# Patient Record
Sex: FEMALE | Race: WHITE | Employment: OTHER | URBAN - METROPOLITAN AREA
[De-identification: names, ages, dates, MRNs, and addresses within clinical notes are randomized per-mention and may not be internally consistent; named-entity substitution may affect disease eponyms.]

---

## 2017-04-04 ENCOUNTER — TELEPHONE (OUTPATIENT)
Dept: FAMILY MEDICINE CLINIC | Facility: CLINIC | Age: 77
End: 2017-04-04

## 2017-04-04 DIAGNOSIS — E03.9 ACQUIRED HYPOTHYROIDISM: Primary | Chronic | ICD-10-CM

## 2017-04-04 RX ORDER — LEVOTHYROXINE SODIUM 0.07 MG/1
TABLET ORAL
Qty: 90 TABLET | Refills: 0 | Status: SHIPPED | OUTPATIENT
Start: 2017-04-04 | End: 2017-05-12

## 2017-04-11 ENCOUNTER — MA CHART PREP (OUTPATIENT)
Dept: FAMILY MEDICINE CLINIC | Facility: CLINIC | Age: 77
End: 2017-04-11

## 2017-04-11 PROBLEM — H25.9 AGE RELATED CATARACT: Status: ACTIVE | Noted: 2017-04-11

## 2017-04-11 PROBLEM — K76.0 FATTY INFILTRATION OF LIVER: Status: ACTIVE | Noted: 2017-04-11

## 2017-04-11 PROBLEM — E66.9 OBESITY (BMI 30.0-34.9): Status: RESOLVED | Noted: 2017-04-11 | Resolved: 2017-04-11

## 2017-04-11 PROBLEM — E66.9 OBESITY (BMI 30.0-34.9): Status: ACTIVE | Noted: 2017-04-11

## 2017-04-11 PROBLEM — E66.811 OBESITY (BMI 30.0-34.9): Status: RESOLVED | Noted: 2017-04-11 | Resolved: 2017-04-11

## 2017-04-11 PROBLEM — E66.811 OBESITY (BMI 30.0-34.9): Status: ACTIVE | Noted: 2017-04-11

## 2017-05-07 ENCOUNTER — OFFICE VISIT (OUTPATIENT)
Dept: FAMILY MEDICINE CLINIC | Facility: CLINIC | Age: 77
End: 2017-05-07

## 2017-05-07 VITALS
HEART RATE: 76 BPM | OXYGEN SATURATION: 99 % | RESPIRATION RATE: 20 BRPM | SYSTOLIC BLOOD PRESSURE: 116 MMHG | DIASTOLIC BLOOD PRESSURE: 68 MMHG | TEMPERATURE: 98 F

## 2017-05-07 DIAGNOSIS — J00 ACUTE NASOPHARYNGITIS: Primary | ICD-10-CM

## 2017-05-07 PROCEDURE — 87880 STREP A ASSAY W/OPTIC: CPT | Performed by: NURSE PRACTITIONER

## 2017-05-07 PROCEDURE — 99213 OFFICE O/P EST LOW 20 MIN: CPT | Performed by: NURSE PRACTITIONER

## 2017-05-07 RX ORDER — ANASTROZOLE 1 MG/1
TABLET ORAL
COMMUNITY
Start: 2017-02-06 | End: 2017-11-15 | Stop reason: ALTCHOICE

## 2017-05-07 NOTE — PATIENT INSTRUCTIONS
Rapid strep is negative  OK to use the [facility] as needed      Adult Self-Care for Colds  Colds are caused by viruses. They can’t be cured with antibiotics. However, you can relieve symptoms and support your body’s efforts to heal itself.  No matter wh When you first notice symptoms, ask your health care provider if antiviral medications are appropriate. Antibiotics should not be taken for colds or flu.  Also, call your doctor if you have any of the following symptoms or if you aren’t feeling better after

## 2017-05-07 NOTE — PROGRESS NOTES
CHIEF COMPLAINT:   Patient presents with:  URI      HPI:   Leah Gutierres is a 78year old female who presents for upper respiratory symptoms for  5 days. Patient reports sore throat, congestion. Symptoms have been constant since onset.   Treating symp HYSTERECTOMY  2/12/13    Comment Prolapse    COLONOSCOPY  1/1/2011    Comment Dr Sabas Rasmussen           Smoking Status: Never Smoker                      Smokeless Status: Never Used                        Alcohol Use: No                  REVIEW OF SYSTEMS:   G Colds are caused by viruses. They can’t be cured with antibiotics. However, you can relieve symptoms and support your body’s efforts to heal itself.  No matter which symptoms you have, be sure to drink plenty of fluids (water or clear soup); stop smoking an When you first notice symptoms, ask your health care provider if antiviral medications are appropriate. Antibiotics should not be taken for colds or flu.  Also, call your doctor if you have any of the following symptoms or if you aren’t feeling better after

## 2017-05-08 ENCOUNTER — TELEPHONE (OUTPATIENT)
Dept: FAMILY MEDICINE CLINIC | Facility: CLINIC | Age: 77
End: 2017-05-08

## 2017-05-10 ENCOUNTER — APPOINTMENT (OUTPATIENT)
Dept: LAB | Age: 77
End: 2017-05-10
Attending: FAMILY MEDICINE
Payer: MEDICARE

## 2017-05-10 DIAGNOSIS — E03.9 ACQUIRED HYPOTHYROIDISM: Chronic | ICD-10-CM

## 2017-05-10 DIAGNOSIS — E11.9 DIABETES MELLITUS TYPE 2, DIET-CONTROLLED (HCC): Chronic | ICD-10-CM

## 2017-05-10 PROCEDURE — 84439 ASSAY OF FREE THYROXINE: CPT

## 2017-05-10 PROCEDURE — 80061 LIPID PANEL: CPT

## 2017-05-10 PROCEDURE — 36415 COLL VENOUS BLD VENIPUNCTURE: CPT

## 2017-05-10 PROCEDURE — 83036 HEMOGLOBIN GLYCOSYLATED A1C: CPT

## 2017-05-10 PROCEDURE — 82570 ASSAY OF URINE CREATININE: CPT

## 2017-05-10 PROCEDURE — 84443 ASSAY THYROID STIM HORMONE: CPT

## 2017-05-10 PROCEDURE — 82043 UR ALBUMIN QUANTITATIVE: CPT

## 2017-05-10 PROCEDURE — 80053 COMPREHEN METABOLIC PANEL: CPT

## 2017-05-11 PROBLEM — E11.22 CKD STAGE 3 SECONDARY TO DIABETES (HCC): Status: ACTIVE | Noted: 2017-05-11

## 2017-05-11 PROBLEM — D69.6 THROMBOCYTOPENIA (HCC): Status: ACTIVE | Noted: 2017-05-11

## 2017-05-11 PROBLEM — N18.30 CKD STAGE 3 SECONDARY TO DIABETES (HCC): Status: ACTIVE | Noted: 2017-05-11

## 2017-05-11 PROBLEM — D69.6 THROMBOCYTOPENIA: Status: ACTIVE | Noted: 2017-05-11

## 2017-05-12 ENCOUNTER — OFFICE VISIT (OUTPATIENT)
Dept: FAMILY MEDICINE CLINIC | Facility: CLINIC | Age: 77
End: 2017-05-12

## 2017-05-12 VITALS
DIASTOLIC BLOOD PRESSURE: 60 MMHG | RESPIRATION RATE: 14 BRPM | HEIGHT: 63 IN | TEMPERATURE: 98 F | HEART RATE: 62 BPM | WEIGHT: 166 LBS | BODY MASS INDEX: 29.41 KG/M2 | SYSTOLIC BLOOD PRESSURE: 110 MMHG

## 2017-05-12 DIAGNOSIS — D69.6 THROMBOCYTOPENIA (HCC): ICD-10-CM

## 2017-05-12 DIAGNOSIS — Z00.00 ANNUAL PHYSICAL EXAM: Primary | ICD-10-CM

## 2017-05-12 DIAGNOSIS — N32.81 OVERACTIVE BLADDER: ICD-10-CM

## 2017-05-12 DIAGNOSIS — K21.9 GASTROESOPHAGEAL REFLUX DISEASE WITHOUT ESOPHAGITIS: ICD-10-CM

## 2017-05-12 DIAGNOSIS — Z17.0 MALIGNANT NEOPLASM OF UPPER-OUTER QUADRANT OF RIGHT BREAST IN FEMALE, ESTROGEN RECEPTOR POSITIVE (HCC): ICD-10-CM

## 2017-05-12 DIAGNOSIS — N18.30 CKD STAGE 3 SECONDARY TO DIABETES (HCC): ICD-10-CM

## 2017-05-12 DIAGNOSIS — M1A.0720 CHRONIC IDIOPATHIC GOUT INVOLVING TOE OF LEFT FOOT WITHOUT TOPHUS: Chronic | ICD-10-CM

## 2017-05-12 DIAGNOSIS — E78.5 HYPERLIPIDEMIA WITH TARGET LDL LESS THAN 70: Chronic | ICD-10-CM

## 2017-05-12 DIAGNOSIS — I10 HYPERTENSION GOAL BP (BLOOD PRESSURE) < 130/80: Chronic | ICD-10-CM

## 2017-05-12 DIAGNOSIS — H25.013 CORTICAL AGE-RELATED CATARACT OF BOTH EYES: ICD-10-CM

## 2017-05-12 DIAGNOSIS — E11.9 DIABETES MELLITUS TYPE 2, DIET-CONTROLLED (HCC): Chronic | ICD-10-CM

## 2017-05-12 DIAGNOSIS — C50.411 MALIGNANT NEOPLASM OF UPPER-OUTER QUADRANT OF RIGHT BREAST IN FEMALE, ESTROGEN RECEPTOR POSITIVE (HCC): ICD-10-CM

## 2017-05-12 DIAGNOSIS — E03.9 ACQUIRED HYPOTHYROIDISM: Chronic | ICD-10-CM

## 2017-05-12 DIAGNOSIS — Z00.00 ENCOUNTER FOR ANNUAL HEALTH EXAMINATION: ICD-10-CM

## 2017-05-12 DIAGNOSIS — F32.5 MAJOR DEPRESSION IN COMPLETE REMISSION (HCC): Chronic | ICD-10-CM

## 2017-05-12 DIAGNOSIS — K76.0 FATTY INFILTRATION OF LIVER: ICD-10-CM

## 2017-05-12 DIAGNOSIS — N99.3 PELVIC RELAXATION DUE TO VAGINAL VAULT PROLAPSE, POSTHYSTERECTOMY: ICD-10-CM

## 2017-05-12 DIAGNOSIS — E11.22 CKD STAGE 3 SECONDARY TO DIABETES (HCC): ICD-10-CM

## 2017-05-12 PROCEDURE — G0439 PPPS, SUBSEQ VISIT: HCPCS | Performed by: FAMILY MEDICINE

## 2017-05-12 PROCEDURE — 96160 PT-FOCUSED HLTH RISK ASSMT: CPT | Performed by: FAMILY MEDICINE

## 2017-05-12 PROCEDURE — 99397 PER PM REEVAL EST PAT 65+ YR: CPT | Performed by: FAMILY MEDICINE

## 2017-05-12 RX ORDER — RANITIDINE 150 MG/1
150 TABLET ORAL 2 TIMES DAILY
Qty: 180 TABLET | Refills: 3 | Status: SHIPPED | OUTPATIENT
Start: 2017-05-12 | End: 2017-11-15

## 2017-05-12 RX ORDER — LEVOTHYROXINE SODIUM 0.07 MG/1
75 TABLET ORAL
Qty: 90 TABLET | Refills: 3 | Status: SHIPPED | OUTPATIENT
Start: 2017-05-12 | End: 2018-05-18

## 2017-05-12 NOTE — ASSESSMENT & PLAN NOTE
Recent left ankle swelling, but toe is pepito place, daily allopurinol, but no other issues    Lab Results  Component Value Date   URIC 3.4 10/10/2016   URIC 3.7 11/07/2014   WBC 5.4 10/10/2016   CREATSERUM 1.04* 05/10/2017   GFR 53* 05/10/2017

## 2017-05-12 NOTE — PROGRESS NOTES
HPI:   Geovanni Leblanc is a 78year old female who presents for a MA (Medicare Advantage) Supervisit (Once per calendar year).     Doing well with DM and CKD 3 with stable GFR  Diabetes    Procedures    Last refreshed: 5/12/2017  9:39 AM:  Last eye exam file       Last refreshed: 5/12/2017  9:39 AM:  ED visits for diabetes 0       Last refreshed: 5/12/2017  9:39 AM:  Last office visit 5/12/2017          Comorbidities    Has hypertension      Current as of: 5/12/2017  9:39 AM           Her last annual asse with Beaulah Meals, MD:  Mirabegron ER (MYRBETRIQ) 50 MG Oral Tablet 24 Hr Take 50 mg by mouth daily.    Levothyroxine Sodium 75 MCG Oral Tab Take 1 tablet (75 mcg total) by mouth before breakfast.   RaNITidine HCl 150 MG Oral Tab Take 1 tablet (150 mg total Gastrointestinal: Negative for abdominal pain. Endocrine: Negative for polydipsia, polyphagia and polyuria. Skin: Negative for rash. Allergic/Immunologic: Negative for immunocompromised state. Neurological: Negative for weakness and numbness.    H gallop, no S3, no S4 and no friction rub. No murmur heard. Edema not present. Carotid bruit not present. Pulmonary/Chest: Effort normal and breath sounds normal. No respiratory distress. She has no decreased breath sounds. She has no wheezes.  She has (blood pressure) < 130/80 (Chronic)    Overview     Losartan -12.5         Current Assessment & Plan     Stable, Continue present management.     Blood Pressure and Cardiac Medications          Losartan Potassium-HCTZ 100-12.5 MG Oral Tab Musculoskeletal    Gout (Chronic)    Overview     Allopurinol 300         Current Assessment & Plan     Recent left ankle swelling, but toe is pepito place, daily allopurinol, but no other issues    Lab Results  Component Value Date   URIC 3.4 10/10/2016   UR indicates understanding of these issues and agrees to the plan. Return in 6 months (on 11/12/2017).      Regina Strange MD, 5/12/2017     General Health     In the past six months, have you lost more than 10 pounds without trying?: 2 - No    Has your appet Little interest or pleasure in doing things (over the last two weeks)?: Not at all    Feeling down, depressed, or hopeless (over the last two weeks)?: Not at all    PHQ-2 SCORE: 0        Advance Directives     Do you have a healthcare power of ?: years No results found for this or any previous visit. No flowsheet data found.     Pap and Pelvic      Pap: Every 3 yrs age 21-68 or Pap+HPV every 5 yrs age 33-67, age 72 and older at high risk There are no preventive care reminders to display for this pat exam  Annually Data entered on: 10/20/2016   Last Dilated Eye Exam 10/19/2016     No flowsheet data found. COPD      Spirometry Testing Annually Spirometry date:  No flowsheet data found.       Template: [facility] MEDICARE ANNUAL ASSESSMENT FEMALE NOTEWRIT

## 2017-05-12 NOTE — ASSESSMENT & PLAN NOTE
Stable, Continue present management.     Cholesterol Lowering Medications          SIMVASTATIN 10 MG Oral Tab

## 2017-05-12 NOTE — ASSESSMENT & PLAN NOTE
Stable, Continue present management.     Thyroid  (most recent labs)     Lab Results  Component Value Date/Time   TSH 2.000 05/10/2017 07:33 AM   T4F 1.1 05/10/2017 07:33 AM         Endocrine Medications          Levothyroxine Sodium 75 MCG Oral Tab

## 2017-05-12 NOTE — PATIENT INSTRUCTIONS
Ino Cuevas's SCREENING SCHEDULE   Tests on this list are recommended by your physician but may not be covered, or covered at this frequency, by your insurer. Please check with your insurance carrier before scheduling to verify coverage.    PREVEN smoked more than 100 cigarettes in their lifetime   • Anyone with a family history    Colorectal Cancer Screening  Covered up to Age 76     Colonoscopy Screen   Covered every 10 years- more often if abnormal Colonoscopy,10 Years due on 01/01/2021 Update He previous visit.  Please get every year    Pneumococcal 13 (Prevnar)  Covered Once after 65   Orders placed or performed in visit on 03/09/16  -PNEUMOCOCCAL VACC, 13 LIDA IM    Please get once after your 65th birthday    Pneumococcal 23 (Pneumovax)  Covered O

## 2017-05-12 NOTE — ASSESSMENT & PLAN NOTE
Stable, Continue present management.     Blood Pressure and Cardiac Medications          Losartan Potassium-HCTZ 100-12.5 MG Oral Tab

## 2017-06-19 PROBLEM — M77.50 TENDONITIS OF FOOT: Status: ACTIVE | Noted: 2017-06-19

## 2017-08-02 PROBLEM — M72.2 PLANTAR FASCIITIS: Status: ACTIVE | Noted: 2017-08-02

## 2017-08-27 ENCOUNTER — OFFICE VISIT (OUTPATIENT)
Dept: FAMILY MEDICINE CLINIC | Facility: CLINIC | Age: 77
End: 2017-08-27

## 2017-08-27 VITALS
OXYGEN SATURATION: 97 % | SYSTOLIC BLOOD PRESSURE: 118 MMHG | TEMPERATURE: 98 F | HEIGHT: 63 IN | HEART RATE: 97 BPM | WEIGHT: 166 LBS | DIASTOLIC BLOOD PRESSURE: 68 MMHG | BODY MASS INDEX: 29.41 KG/M2

## 2017-08-27 DIAGNOSIS — H65.191 ACUTE EFFUSION OF RIGHT EAR: Primary | ICD-10-CM

## 2017-08-27 DIAGNOSIS — J01.10 ACUTE FRONTAL SINUSITIS, RECURRENCE NOT SPECIFIED: ICD-10-CM

## 2017-08-27 PROCEDURE — 99213 OFFICE O/P EST LOW 20 MIN: CPT | Performed by: NURSE PRACTITIONER

## 2017-08-27 RX ORDER — AMOXICILLIN AND CLAVULANATE POTASSIUM 875; 125 MG/1; MG/1
1 TABLET, FILM COATED ORAL 2 TIMES DAILY
Qty: 20 TABLET | Refills: 0 | Status: SHIPPED | OUTPATIENT
Start: 2017-08-27 | End: 2017-09-06

## 2017-08-27 NOTE — PROGRESS NOTES
CHIEF COMPLAINT:   Patient presents with:  Sore Throat: Right side of throat for 9 days  Ear Pain: right ear for 3 days      HPI:   Mandy Ray is a 78year old female who presents for upper respiratory symptoms for  9 days.  Patient reports sore th Medical History:   Diagnosis Date   • Breast cancer [facility]) 09-07-12   • S/P lumpectomy, right breast 09/12      Past Surgical History:  1/1/2011: COLONOSCOPY      Comment: Dr Cuca Ramos  3/1/2011: ESOPHAGOSCOPY,BIOPSY      Comment: Dr Alysha Stratton  2/12/13: Fabrice Hylton Clavulanate 875-125 MG Oral Tab 20 tablet 0      Sig: Take 1 tablet by mouth 2 (two) times daily. Risks, benefits, and side effects of medication explained and discussed.         The patient indicates understanding of these issues and agrees to th

## 2017-09-01 DIAGNOSIS — E78.5 HYPERLIPIDEMIA WITH TARGET LDL LESS THAN 70: Chronic | ICD-10-CM

## 2017-09-05 RX ORDER — SIMVASTATIN 10 MG
TABLET ORAL
Qty: 90 TABLET | Refills: 0 | Status: SHIPPED | OUTPATIENT
Start: 2017-09-05 | End: 2017-12-06

## 2017-10-19 DIAGNOSIS — K21.9 GASTROESOPHAGEAL REFLUX DISEASE WITHOUT ESOPHAGITIS: ICD-10-CM

## 2017-10-19 DIAGNOSIS — M1A.0720 CHRONIC IDIOPATHIC GOUT INVOLVING TOE OF LEFT FOOT WITHOUT TOPHUS: Chronic | ICD-10-CM

## 2017-10-23 RX ORDER — ALLOPURINOL 300 MG/1
TABLET ORAL
Qty: 90 TABLET | Refills: 0 | Status: SHIPPED | OUTPATIENT
Start: 2017-10-23 | End: 2018-01-23

## 2017-10-23 RX ORDER — PANTOPRAZOLE SODIUM 40 MG/1
40 TABLET, DELAYED RELEASE ORAL
Qty: 90 TABLET | Refills: 0 | Status: SHIPPED | OUTPATIENT
Start: 2017-10-23 | End: 2018-01-23

## 2017-10-23 NOTE — TELEPHONE ENCOUNTER
Allopurinol requested through pharmacy. LOV 05/12/17 and labs 05/10/17. Next appt 11/15/17. Will you approve refill ? Routed to Dr Rani Anderson.

## 2017-11-09 ENCOUNTER — TELEPHONE (OUTPATIENT)
Dept: FAMILY MEDICINE CLINIC | Facility: CLINIC | Age: 77
End: 2017-11-09

## 2017-11-09 DIAGNOSIS — E03.9 ACQUIRED HYPOTHYROIDISM: Chronic | ICD-10-CM

## 2017-11-09 DIAGNOSIS — D69.6 THROMBOCYTOPENIA (HCC): ICD-10-CM

## 2017-11-09 DIAGNOSIS — E11.9 DIABETES MELLITUS TYPE 2, DIET-CONTROLLED (HCC): Primary | Chronic | ICD-10-CM

## 2017-11-09 DIAGNOSIS — M1A.0720 CHRONIC IDIOPATHIC GOUT INVOLVING TOE OF LEFT FOOT WITHOUT TOPHUS: Chronic | ICD-10-CM

## 2017-11-09 NOTE — TELEPHONE ENCOUNTER
Labs ready . Diagnoses and all orders for this visit:    Diabetes mellitus type 2, diet-controlled ([facility] [address]  -     COMP METABOLIC PANEL (14); Future  -     LIPID PANEL;  Future  -     HEMOGLOBIN A1C; Future    Acquired hypothyroidism  -     TSH W REFLEX TO FREE

## 2017-11-10 ENCOUNTER — LAB ENCOUNTER (OUTPATIENT)
Dept: LAB | Age: 77
End: 2017-11-10
Attending: FAMILY MEDICINE
Payer: MEDICARE

## 2017-11-10 DIAGNOSIS — E11.9 DIABETES MELLITUS TYPE 2, DIET-CONTROLLED (HCC): Chronic | ICD-10-CM

## 2017-11-10 DIAGNOSIS — M1A.0720 CHRONIC IDIOPATHIC GOUT INVOLVING TOE OF LEFT FOOT WITHOUT TOPHUS: ICD-10-CM

## 2017-11-10 DIAGNOSIS — E03.9 ACQUIRED HYPOTHYROIDISM: Chronic | ICD-10-CM

## 2017-11-10 DIAGNOSIS — D69.6 THROMBOCYTOPENIA (HCC): ICD-10-CM

## 2017-11-10 PROCEDURE — 80053 COMPREHEN METABOLIC PANEL: CPT

## 2017-11-10 PROCEDURE — 80061 LIPID PANEL: CPT

## 2017-11-10 PROCEDURE — 36415 COLL VENOUS BLD VENIPUNCTURE: CPT

## 2017-11-10 PROCEDURE — 84443 ASSAY THYROID STIM HORMONE: CPT

## 2017-11-10 PROCEDURE — 85025 COMPLETE CBC W/AUTO DIFF WBC: CPT

## 2017-11-10 PROCEDURE — 84550 ASSAY OF BLOOD/URIC ACID: CPT

## 2017-11-10 PROCEDURE — 83036 HEMOGLOBIN GLYCOSYLATED A1C: CPT

## 2017-11-15 ENCOUNTER — OFFICE VISIT (OUTPATIENT)
Dept: FAMILY MEDICINE CLINIC | Facility: CLINIC | Age: 77
End: 2017-11-15

## 2017-11-15 VITALS
DIASTOLIC BLOOD PRESSURE: 72 MMHG | RESPIRATION RATE: 16 BRPM | HEART RATE: 82 BPM | WEIGHT: 168 LBS | BODY MASS INDEX: 29.77 KG/M2 | HEIGHT: 63 IN | SYSTOLIC BLOOD PRESSURE: 120 MMHG | TEMPERATURE: 98 F

## 2017-11-15 DIAGNOSIS — M77.50 TENDONITIS OF FOOT: ICD-10-CM

## 2017-11-15 DIAGNOSIS — M25.572 CHRONIC PAIN OF LEFT ANKLE: ICD-10-CM

## 2017-11-15 DIAGNOSIS — E11.9 DIABETES MELLITUS TYPE 2, DIET-CONTROLLED (HCC): Primary | Chronic | ICD-10-CM

## 2017-11-15 DIAGNOSIS — E78.5 HYPERLIPIDEMIA WITH TARGET LDL LESS THAN 70: Chronic | ICD-10-CM

## 2017-11-15 DIAGNOSIS — G89.29 CHRONIC PAIN OF LEFT ANKLE: ICD-10-CM

## 2017-11-15 DIAGNOSIS — E03.9 ACQUIRED HYPOTHYROIDISM: Chronic | ICD-10-CM

## 2017-11-15 DIAGNOSIS — N18.30 CKD STAGE 3 SECONDARY TO DIABETES (HCC): ICD-10-CM

## 2017-11-15 DIAGNOSIS — G89.29 CHRONIC PAIN OF RIGHT ANKLE: ICD-10-CM

## 2017-11-15 DIAGNOSIS — M25.571 CHRONIC PAIN OF RIGHT ANKLE: ICD-10-CM

## 2017-11-15 DIAGNOSIS — K21.9 GASTROESOPHAGEAL REFLUX DISEASE WITHOUT ESOPHAGITIS: ICD-10-CM

## 2017-11-15 DIAGNOSIS — I10 HYPERTENSION GOAL BP (BLOOD PRESSURE) < 130/80: Chronic | ICD-10-CM

## 2017-11-15 DIAGNOSIS — F32.5 MAJOR DEPRESSION IN COMPLETE REMISSION (HCC): Chronic | ICD-10-CM

## 2017-11-15 DIAGNOSIS — E11.22 CKD STAGE 3 SECONDARY TO DIABETES (HCC): ICD-10-CM

## 2017-11-15 PROBLEM — H25.813 COMBINED FORMS OF AGE-RELATED CATARACT, BILATERAL: Status: ACTIVE | Noted: 2017-04-11

## 2017-11-15 PROCEDURE — 99214 OFFICE O/P EST MOD 30 MIN: CPT | Performed by: FAMILY MEDICINE

## 2017-11-15 NOTE — PROGRESS NOTES
HPI:   Edilson Prieto is a 78year old female who presents for recheck of her diabetes.   Worsening foot problems, saw podiatry earlier this year but is having more pain with range of motion in the ankle, more trouble walking on this and wore a boot ea AM   T4F 1.1 05/10/2017 07:33 AM   TSH 0.983 11/10/2017 08:20 AM   BUN 18 11/10/2017 08:20 AM   CREATSERUM 0.90 11/10/2017 08:20 AM   GFR 63 11/10/2017 08:20 AM         Wt Readings from Last 3 Encounters:  11/15/17 : 168 lb  08/27/17 : 166 lb  06/19/17 : 1 shortness of breath with exertion  CARDIOVASCULAR: denies chest pain on exertion  GI: denies abdominal pain and denies heartburn  NEURO: denies headaches    EXAM:   /72   Pulse 82   Temp 97.9 °F (36.6 °C)   Resp 16   Ht 63\"   Wt 168 lb   BMI 29.76 k medication side effects noted.    PLAN: will continue present medications, reviewed diet, exercise and weight control, and labs as ordered              Ms. Carolina Burns already takes aspirin and has it on her medication list.     The patient indicates understand right ankle        Chronic pain of left ankle        Relevant Orders    MRI ANKLE, LEFT (CPT=73721)      Referral to Dr. Reed Murphy and follow-up if no great improvement  Return in about 6 months (around 5/15/2018) for AZIZA LYLE ([address]) vis

## 2017-11-15 NOTE — ASSESSMENT & PLAN NOTE
Stable, Continue present management.     Thyroid  (most recent labs)     Lab Results  Component Value Date/Time   TSH 0.983 11/10/2017 08:20 AM   T4F 1.1 05/10/2017 07:33 AM         Endocrine Medications          Levothyroxine Sodium 75 MCG Oral Tab

## 2017-11-27 ENCOUNTER — HOSPITAL ENCOUNTER (OUTPATIENT)
Dept: MRI IMAGING | Facility: HOSPITAL | Age: 77
Discharge: HOME | End: 2017-11-27
Attending: FAMILY MEDICINE
Payer: MEDICARE

## 2017-11-27 DIAGNOSIS — G89.29 CHRONIC PAIN OF LEFT ANKLE: ICD-10-CM

## 2017-11-27 DIAGNOSIS — M77.50 TENDONITIS OF FOOT: ICD-10-CM

## 2017-11-27 DIAGNOSIS — M25.572 CHRONIC PAIN OF LEFT ANKLE: ICD-10-CM

## 2017-11-27 PROCEDURE — 73721 MRI JNT OF LWR EXTRE W/O DYE: CPT | Performed by: FAMILY MEDICINE

## 2017-11-30 ENCOUNTER — TELEPHONE (OUTPATIENT)
Dept: FAMILY MEDICINE CLINIC | Facility: CLINIC | Age: 77
End: 2017-11-30

## 2017-12-06 DIAGNOSIS — F32.5 MAJOR DEPRESSION IN COMPLETE REMISSION (HCC): Chronic | ICD-10-CM

## 2017-12-06 DIAGNOSIS — Z51.81 ENCOUNTER FOR THERAPEUTIC DRUG MONITORING: ICD-10-CM

## 2017-12-06 DIAGNOSIS — E78.5 HYPERLIPIDEMIA WITH TARGET LDL LESS THAN 70: Chronic | ICD-10-CM

## 2017-12-07 PROBLEM — S86.112A: Status: ACTIVE | Noted: 2017-12-07

## 2017-12-07 RX ORDER — SIMVASTATIN 10 MG
TABLET ORAL
Qty: 90 TABLET | Refills: 2 | Status: SHIPPED | OUTPATIENT
Start: 2017-12-07 | End: 2018-08-29

## 2017-12-07 RX ORDER — ESCITALOPRAM OXALATE 20 MG/1
20 TABLET ORAL DAILY
Qty: 90 TABLET | Refills: 3 | Status: SHIPPED | OUTPATIENT
Start: 2017-12-07 | End: 2018-11-12

## 2017-12-07 RX ORDER — TRAZODONE HYDROCHLORIDE 50 MG/1
50 TABLET ORAL NIGHTLY
Qty: 90 TABLET | Refills: 3 | Status: SHIPPED | OUTPATIENT
Start: 2017-12-07 | End: 2018-11-12

## 2017-12-07 NOTE — TELEPHONE ENCOUNTER
Refill request sent from pharmacy for Trazadone and [id number] [facility]. LOV 11/15/17 and labs 11/10/17. Will you approve ? Routed to Dr Bessie Felipe

## 2017-12-20 DIAGNOSIS — I10 ESSENTIAL HYPERTENSION WITH GOAL BLOOD PRESSURE LESS THAN 130/80: Chronic | ICD-10-CM

## 2017-12-21 RX ORDER — LOSARTAN POTASSIUM AND HYDROCHLOROTHIAZIDE 12.5; 1 MG/1; MG/1
1 TABLET ORAL
Qty: 90 TABLET | Refills: 1 | Status: SHIPPED | OUTPATIENT
Start: 2017-12-21 | End: 2018-05-18

## 2017-12-21 NOTE — TELEPHONE ENCOUNTER
Medication refilled per protocol. Signed Prescriptions Disp Refills    LOSARTAN POTASSIUM-HCTZ 100-12.5 MG Oral Tab 90 tablet 1      Sig: TAKE 1 TABLET BY MOUTH ONCE DAILY.         Authorizing Provider: Enrique Avelar        Ordering User: Kenia Berger

## 2018-01-03 ENCOUNTER — OFFICE VISIT (OUTPATIENT)
Dept: FAMILY MEDICINE CLINIC | Facility: CLINIC | Age: 78
End: 2018-01-03

## 2018-01-03 VITALS
DIASTOLIC BLOOD PRESSURE: 76 MMHG | RESPIRATION RATE: 16 BRPM | SYSTOLIC BLOOD PRESSURE: 122 MMHG | TEMPERATURE: 98 F | OXYGEN SATURATION: 99 % | WEIGHT: 168 LBS | HEART RATE: 78 BPM | BODY MASS INDEX: 29.77 KG/M2 | HEIGHT: 63 IN

## 2018-01-03 DIAGNOSIS — J06.9 VIRAL UPPER RESPIRATORY TRACT INFECTION: Primary | ICD-10-CM

## 2018-01-03 PROCEDURE — 99213 OFFICE O/P EST LOW 20 MIN: CPT | Performed by: FAMILY MEDICINE

## 2018-01-03 NOTE — PATIENT INSTRUCTIONS
Continue to monitor symptoms. Use saline nasal spray for congestion. If runny nose continues, you may try claritin/zyrtec to reduce nasal drainage.    Consider applying zoila's vapo-rub or eucalpytus oil to chest and feet at bedtime to reduce chest and n

## 2018-01-06 ENCOUNTER — OFFICE VISIT (OUTPATIENT)
Dept: FAMILY MEDICINE CLINIC | Facility: CLINIC | Age: 78
End: 2018-01-06

## 2018-01-06 VITALS
HEART RATE: 92 BPM | TEMPERATURE: 99 F | OXYGEN SATURATION: 98 % | BODY MASS INDEX: 29.77 KG/M2 | WEIGHT: 168 LBS | SYSTOLIC BLOOD PRESSURE: 116 MMHG | HEIGHT: 63 IN | RESPIRATION RATE: 18 BRPM | DIASTOLIC BLOOD PRESSURE: 78 MMHG

## 2018-01-06 DIAGNOSIS — H61.22 IMPACTED CERUMEN OF LEFT EAR: Primary | ICD-10-CM

## 2018-01-06 PROCEDURE — 69210 REMOVE IMPACTED EAR WAX UNI: CPT | Performed by: FAMILY MEDICINE

## 2018-01-06 NOTE — PROGRESS NOTES
CHIEF COMPLAINT:   Patient presents with:  Ear Problem: ear cleaning      HPI:   Jessica Brown is a 78year old female who presents for left sided plugged sensation in the ears. Was seen 3 days ago for uri and left ear plugged sensation.  Cerumen impactio Rzap  2/12/13: HYSTERECTOMY      Comment: Prolapse  9/12: LUMPECTOMY RIGHT      Smoking status: Never Smoker                                                              Smokeless tobacco: Never Used                      Alcohol use:  No                  RE

## 2018-01-23 DIAGNOSIS — K21.9 GASTROESOPHAGEAL REFLUX DISEASE WITHOUT ESOPHAGITIS: ICD-10-CM

## 2018-01-23 DIAGNOSIS — M1A.0720 CHRONIC IDIOPATHIC GOUT INVOLVING TOE OF LEFT FOOT WITHOUT TOPHUS: Chronic | ICD-10-CM

## 2018-01-23 RX ORDER — ALLOPURINOL 300 MG/1
TABLET ORAL
Qty: 90 TABLET | Refills: 3 | Status: SHIPPED | OUTPATIENT
Start: 2018-01-23 | End: 2019-01-28

## 2018-01-23 RX ORDER — PANTOPRAZOLE SODIUM 40 MG/1
40 TABLET, DELAYED RELEASE ORAL
Qty: 90 TABLET | Refills: 0 | Status: SHIPPED | OUTPATIENT
Start: 2018-01-23 | End: 2018-03-06 | Stop reason: DRUGHIGH

## 2018-01-23 NOTE — TELEPHONE ENCOUNTER
Allopurinol refill requested through pharmacy. LOV 11/15/17 and lab 11/10/17. Will you approve ? Routed to Dr Juarez Franco.

## 2018-03-06 ENCOUNTER — OFFICE VISIT (OUTPATIENT)
Dept: FAMILY MEDICINE CLINIC | Facility: CLINIC | Age: 78
End: 2018-03-06

## 2018-03-06 VITALS
RESPIRATION RATE: 14 BRPM | TEMPERATURE: 98 F | WEIGHT: 168.38 LBS | SYSTOLIC BLOOD PRESSURE: 120 MMHG | DIASTOLIC BLOOD PRESSURE: 70 MMHG | HEART RATE: 64 BPM | BODY MASS INDEX: 29.84 KG/M2 | HEIGHT: 62.8 IN

## 2018-03-06 DIAGNOSIS — R22.42 LUMP OF SKIN OF LEFT LOWER EXTREMITY: Primary | ICD-10-CM

## 2018-03-06 PROCEDURE — 99213 OFFICE O/P EST LOW 20 MIN: CPT | Performed by: FAMILY MEDICINE

## 2018-03-06 RX ORDER — PANTOPRAZOLE SODIUM 40 MG/1
40 TABLET, DELAYED RELEASE ORAL NIGHTLY
COMMUNITY
End: 2018-08-01

## 2018-03-06 RX ORDER — MULTIVITAMIN WITH IRON
250 TABLET ORAL DAILY
COMMUNITY

## 2018-03-06 NOTE — PROGRESS NOTES
Chief Complaint:  Patient presents with:  Lump: lump on left hip found one week ago- has history of breast cancer- is concerned    HPI:  This is a 78year old female patient presenting for Lump (lump on left hip found one week ago- has history of breast ca Rfl: 3   SIMVASTATIN 10 MG Oral Tab TAKE 1 TABLET BY MOUTH NIGHTLY.  Disp: 90 tablet Rfl: 2   Levothyroxine Sodium 75 MCG Oral Tab Take 1 tablet (75 mcg total) by mouth before breakfast. Disp: 90 tablet Rfl: 3   Psyllium (TGT PSYLLIUM FIBER) 0.52 G Oral Cap

## 2018-04-26 ENCOUNTER — TELEPHONE (OUTPATIENT)
Dept: FAMILY MEDICINE CLINIC | Facility: CLINIC | Age: 78
End: 2018-04-26

## 2018-04-26 DIAGNOSIS — R22.42 LUMP OF SKIN OF LEFT LOWER EXTREMITY: Primary | ICD-10-CM

## 2018-04-26 NOTE — TELEPHONE ENCOUNTER
Pt is calling she has noticed that the growth on her lower extremity has changed shape from long to circular. She was told if it changed that Dr. Sarah Xavier  would order an Ultrasound. Please call patient with questions.

## 2018-04-26 NOTE — TELEPHONE ENCOUNTER
Patient states growth on leg has changed shape to circular from elongated. It has not grown in size. Patient wondering if she should have ultrasound?     Routed to Dr. Rosibel Fernandez

## 2018-04-27 NOTE — TELEPHONE ENCOUNTER
LMOM for Ino, telling her an [address] was ordered by Sg Lopez. Gave her the number of Central Scheduling to call for an appointment.

## 2018-04-30 DIAGNOSIS — K21.9 GASTROESOPHAGEAL REFLUX DISEASE WITHOUT ESOPHAGITIS: ICD-10-CM

## 2018-05-01 RX ORDER — PANTOPRAZOLE SODIUM 40 MG/1
40 TABLET, DELAYED RELEASE ORAL
Qty: 90 TABLET | Refills: 0 | Status: SHIPPED | OUTPATIENT
Start: 2018-05-01 | End: 2018-05-18

## 2018-05-07 ENCOUNTER — HOSPITAL ENCOUNTER (OUTPATIENT)
Dept: ULTRASOUND IMAGING | Facility: HOSPITAL | Age: 78
Discharge: HOME | End: 2018-05-07
Attending: FAMILY MEDICINE
Payer: MEDICARE

## 2018-05-07 DIAGNOSIS — R22.42 LUMP OF SKIN OF LEFT LOWER EXTREMITY: ICD-10-CM

## 2018-05-07 PROCEDURE — 76882 US LMTD JT/FCL EVL NVASC XTR: CPT | Performed by: FAMILY MEDICINE

## 2018-05-14 ENCOUNTER — LAB ENCOUNTER (OUTPATIENT)
Dept: LAB | Age: 78
End: 2018-05-14
Attending: FAMILY MEDICINE
Payer: MEDICARE

## 2018-05-14 DIAGNOSIS — E11.9 DIABETES MELLITUS TYPE 2, DIET-CONTROLLED (HCC): Chronic | ICD-10-CM

## 2018-05-14 DIAGNOSIS — E11.22 CKD STAGE 3 SECONDARY TO DIABETES (HCC): ICD-10-CM

## 2018-05-14 DIAGNOSIS — E03.9 ACQUIRED HYPOTHYROIDISM: Chronic | ICD-10-CM

## 2018-05-14 DIAGNOSIS — N18.30 CKD STAGE 3 SECONDARY TO DIABETES (HCC): ICD-10-CM

## 2018-05-14 PROCEDURE — 84439 ASSAY OF FREE THYROXINE: CPT

## 2018-05-14 PROCEDURE — 36415 COLL VENOUS BLD VENIPUNCTURE: CPT

## 2018-05-14 PROCEDURE — 82570 ASSAY OF URINE CREATININE: CPT

## 2018-05-14 PROCEDURE — 80061 LIPID PANEL: CPT

## 2018-05-14 PROCEDURE — 80053 COMPREHEN METABOLIC PANEL: CPT

## 2018-05-14 PROCEDURE — 84443 ASSAY THYROID STIM HORMONE: CPT

## 2018-05-14 PROCEDURE — 82043 UR ALBUMIN QUANTITATIVE: CPT

## 2018-05-14 PROCEDURE — 85025 COMPLETE CBC W/AUTO DIFF WBC: CPT

## 2018-05-14 PROCEDURE — 83036 HEMOGLOBIN GLYCOSYLATED A1C: CPT

## 2018-05-16 PROBLEM — M72.2 PLANTAR FASCIITIS: Status: RESOLVED | Noted: 2017-08-02 | Resolved: 2018-05-16

## 2018-05-16 PROBLEM — S86.112A: Status: RESOLVED | Noted: 2017-12-07 | Resolved: 2018-05-16

## 2018-05-16 PROBLEM — M77.50 TENDONITIS OF FOOT: Status: RESOLVED | Noted: 2017-06-19 | Resolved: 2018-05-16

## 2018-05-17 NOTE — ASSESSMENT & PLAN NOTE
Stable, Continue present management.     Thyroid  (most recent labs)     Lab Results  Component Value Date/Time   TSH 1.060 05/14/2018 07:44 AM   T4F 1.0 05/14/2018 07:44 AM         Endocrine Medications          Levothyroxine Sodium 75 MCG Oral Tab

## 2018-05-17 NOTE — ASSESSMENT & PLAN NOTE
Stable, Continue present management.  .     Lab Results  Component Value Date   .0 (L) 05/14/2018   .0 (L) 11/10/2017   .0 (L) 10/10/2016

## 2018-05-17 NOTE — ASSESSMENT & PLAN NOTE
Stable on meds.  Continue present managememt     Lab Results  Component Value Date   URIC 3.2 11/10/2017   URIC 3.4 10/10/2016   WBC 5.4 05/14/2018   CREATSERUM 0.99 05/14/2018   GFR 63 11/10/2017

## 2018-05-18 ENCOUNTER — OFFICE VISIT (OUTPATIENT)
Dept: FAMILY MEDICINE CLINIC | Facility: CLINIC | Age: 78
End: 2018-05-18

## 2018-05-18 VITALS
DIASTOLIC BLOOD PRESSURE: 70 MMHG | TEMPERATURE: 98 F | WEIGHT: 174 LBS | SYSTOLIC BLOOD PRESSURE: 124 MMHG | HEIGHT: 63 IN | HEART RATE: 64 BPM | RESPIRATION RATE: 16 BRPM | BODY MASS INDEX: 30.83 KG/M2

## 2018-05-18 DIAGNOSIS — I10 HYPERTENSION GOAL BP (BLOOD PRESSURE) < 130/80: Chronic | ICD-10-CM

## 2018-05-18 DIAGNOSIS — E78.5 HYPERLIPIDEMIA WITH TARGET LDL LESS THAN 70: Chronic | ICD-10-CM

## 2018-05-18 DIAGNOSIS — K76.0 FATTY INFILTRATION OF LIVER: ICD-10-CM

## 2018-05-18 DIAGNOSIS — N32.81 OVERACTIVE BLADDER: ICD-10-CM

## 2018-05-18 DIAGNOSIS — N99.3 PELVIC RELAXATION DUE TO VAGINAL VAULT PROLAPSE, POSTHYSTERECTOMY: ICD-10-CM

## 2018-05-18 DIAGNOSIS — K21.9 GASTROESOPHAGEAL REFLUX DISEASE WITHOUT ESOPHAGITIS: ICD-10-CM

## 2018-05-18 DIAGNOSIS — Z00.00 ANNUAL PHYSICAL EXAM: Primary | ICD-10-CM

## 2018-05-18 DIAGNOSIS — Z17.0 MALIGNANT NEOPLASM OF UPPER-OUTER QUADRANT OF RIGHT BREAST IN FEMALE, ESTROGEN RECEPTOR POSITIVE (HCC): ICD-10-CM

## 2018-05-18 DIAGNOSIS — E03.9 ACQUIRED HYPOTHYROIDISM: Chronic | ICD-10-CM

## 2018-05-18 DIAGNOSIS — M1A.0720 CHRONIC IDIOPATHIC GOUT INVOLVING TOE OF LEFT FOOT WITHOUT TOPHUS: Chronic | ICD-10-CM

## 2018-05-18 DIAGNOSIS — C50.411 MALIGNANT NEOPLASM OF UPPER-OUTER QUADRANT OF RIGHT BREAST IN FEMALE, ESTROGEN RECEPTOR POSITIVE (HCC): ICD-10-CM

## 2018-05-18 DIAGNOSIS — E11.9 DIABETES MELLITUS TYPE 2, DIET-CONTROLLED (HCC): Chronic | ICD-10-CM

## 2018-05-18 DIAGNOSIS — N18.30 CKD STAGE 3 SECONDARY TO DIABETES (HCC): ICD-10-CM

## 2018-05-18 DIAGNOSIS — H25.813 COMBINED FORMS OF AGE-RELATED CATARACT, BILATERAL: ICD-10-CM

## 2018-05-18 DIAGNOSIS — E11.22 CKD STAGE 3 SECONDARY TO DIABETES (HCC): ICD-10-CM

## 2018-05-18 DIAGNOSIS — F32.5 MAJOR DEPRESSION IN COMPLETE REMISSION (HCC): Chronic | ICD-10-CM

## 2018-05-18 DIAGNOSIS — D69.6 THROMBOCYTOPENIA (HCC): ICD-10-CM

## 2018-05-18 PROCEDURE — G0439 PPPS, SUBSEQ VISIT: HCPCS | Performed by: FAMILY MEDICINE

## 2018-05-18 PROCEDURE — 96160 PT-FOCUSED HLTH RISK ASSMT: CPT | Performed by: FAMILY MEDICINE

## 2018-05-18 PROCEDURE — 99397 PER PM REEVAL EST PAT 65+ YR: CPT | Performed by: FAMILY MEDICINE

## 2018-05-18 RX ORDER — LEVOTHYROXINE SODIUM 0.07 MG/1
75 TABLET ORAL
Qty: 90 TABLET | Refills: 3 | Status: SHIPPED | OUTPATIENT
Start: 2018-05-18 | End: 2019-07-24

## 2018-05-18 RX ORDER — LOSARTAN POTASSIUM AND HYDROCHLOROTHIAZIDE 12.5; 1 MG/1; MG/1
1 TABLET ORAL
Qty: 90 TABLET | Refills: 1 | Status: SHIPPED | OUTPATIENT
Start: 2018-05-18 | End: 2018-11-12

## 2018-05-18 NOTE — PATIENT INSTRUCTIONS
Ino Cuevas's SCREENING SCHEDULE   Tests on this list are recommended by your physician but may not be covered, or covered at this frequency, by your insurer. Please check with your insurance carrier before scheduling to verify coverage.    PREVENT Limited to patients who meet one of the following criteria:   • Men who are 73-68 years old and have smoked more than 100 cigarettes in their lifetime   • Anyone with a family history    Colorectal Cancer Screening  Covered up to Age 76     Colonoscopy Scr this patient. Please get this Mammogram regularly   Immunizations      Influenza  Covered Annually No orders found for this or any previous visit.  Please get every year    Pneumococcal 13 (Prevnar)  Covered Once after 65   Orders placed or performed in vis regarding Advance Directives.

## 2018-05-18 NOTE — PROGRESS NOTES
HPI:   Celi Vasquez is a 78year old female who presents for a MA (Medicare Advantage) Supervisit (Once per calendar year). Vit D caused leg cramps and better with stopping.   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 Ascorbic Acid (VITAMIN C) 500 MG Oral Cap Take 1,000 mg by mouth. Aspirin (ASPIR-81 OR) Take  by mouth daily.    MULTI-VITAMIN OR None Entered      MEDICAL INFORMATION:   She  has a past Acuity: Corrected Left Eye Chart Acuity: 20/30   Both Eyes Visual Acuity: Corrected Both Eyes Chart Acuity: 20/30   Able To Tolerate Visual Acuity: Yes      Physical Exam   Nursing note and vitals reviewed.    Constitutional: She is oriented to person, plac has a normal mood and affect.  Her speech is normal and behavior is normal. Judgment and thought content normal. Cognition and memory are normal.        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     Fasting Blood Sugar (FSB)Annually   Glucose (mg/dL)   Date Value   05/14/2018 86   ----------  GLUCOSE (mg/dL)   Date Value (Pneumovax)  Covered Once after 65 11/02/2009 Please get once after your 65th birthday    Hepatitis B for Moderate/High Risk No vaccine history found Medium/high risk factors:   End-stage renal disease   Hemophiliacs who received Factor VIII or IX concentr found.            Template: [facility] MED[facility] ANNUAL ASSESSMENT FEMALE Jeannine Manuela [[id number]]

## 2018-05-23 ENCOUNTER — TELEPHONE (OUTPATIENT)
Dept: FAMILY MEDICINE CLINIC | Facility: CLINIC | Age: 78
End: 2018-05-23

## 2018-05-23 NOTE — TELEPHONE ENCOUNTER
Pt wants to do Cologuard testing instead of Colonoscopy. Is it ok to order? Routed to Dr Elsa Smith.

## 2018-05-23 NOTE — TELEPHONE ENCOUNTER
Patient was referred to Dr. Dalila Wilkerson for colonoscopy decided she now wants to do cologuard. Can Dr. Josefina Thompson order test for her?

## 2018-08-01 DIAGNOSIS — E03.9 ACQUIRED HYPOTHYROIDISM: Chronic | ICD-10-CM

## 2018-08-01 DIAGNOSIS — N32.81 OVERACTIVE BLADDER: ICD-10-CM

## 2018-08-01 DIAGNOSIS — K21.9 GASTROESOPHAGEAL REFLUX DISEASE WITHOUT ESOPHAGITIS: ICD-10-CM

## 2018-08-01 RX ORDER — PANTOPRAZOLE SODIUM 40 MG/1
40 TABLET, DELAYED RELEASE ORAL
Qty: 90 TABLET | Refills: 3 | Status: SHIPPED | OUTPATIENT
Start: 2018-08-01 | End: 2019-07-24

## 2018-08-01 RX ORDER — LEVOTHYROXINE SODIUM 0.07 MG/1
75 TABLET ORAL
Qty: 90 TABLET | OUTPATIENT
Start: 2018-08-01

## 2018-08-01 RX ORDER — MIRABEGRON 50 MG/1
TABLET, FILM COATED, EXTENDED RELEASE ORAL
Qty: 90 TABLET | OUTPATIENT
Start: 2018-08-01

## 2018-08-01 NOTE — TELEPHONE ENCOUNTER
Denied refill request for Levothyroxine and Myrbetriq because they were refilled on 5/18/28 for one year.

## 2018-08-01 NOTE — TELEPHONE ENCOUNTER
LOV 5/18/2018     Future Appointments  Date Time Provider Annette Flora   11/12/2018 12:30 PM Gurvinder Rivera MD [facility]        Refill request for:      Pending Prescriptions Disp Refills    PANTOPRAZOLE SODIUM 40 MG Oral Tab EC [Pharmacy Med N

## 2018-08-29 DIAGNOSIS — E78.5 HYPERLIPIDEMIA WITH TARGET LDL LESS THAN 70: Chronic | ICD-10-CM

## 2018-08-29 RX ORDER — SIMVASTATIN 10 MG
TABLET ORAL
Qty: 90 TABLET | Refills: 1 | Status: SHIPPED | OUTPATIENT
Start: 2018-08-29 | End: 2019-02-22

## 2018-09-17 ENCOUNTER — OFFICE VISIT (OUTPATIENT)
Dept: FAMILY MEDICINE CLINIC | Facility: CLINIC | Age: 78
End: 2018-09-17
Payer: COMMERCIAL

## 2018-09-17 VITALS
DIASTOLIC BLOOD PRESSURE: 70 MMHG | OXYGEN SATURATION: 95 % | WEIGHT: 178 LBS | TEMPERATURE: 100 F | BODY MASS INDEX: 31.54 KG/M2 | HEIGHT: 63 IN | SYSTOLIC BLOOD PRESSURE: 120 MMHG | HEART RATE: 97 BPM

## 2018-09-17 DIAGNOSIS — J01.10 ACUTE NON-RECURRENT FRONTAL SINUSITIS: Primary | ICD-10-CM

## 2018-09-17 DIAGNOSIS — J40 BRONCHITIS: ICD-10-CM

## 2018-09-17 PROCEDURE — 99213 OFFICE O/P EST LOW 20 MIN: CPT | Performed by: FAMILY MEDICINE

## 2018-09-17 RX ORDER — AMOXICILLIN AND CLAVULANATE POTASSIUM 875; 125 MG/1; MG/1
1 TABLET, FILM COATED ORAL 2 TIMES DAILY
Qty: 20 TABLET | Refills: 0 | Status: SHIPPED | OUTPATIENT
Start: 2018-09-17 | End: 2018-09-27

## 2018-09-17 RX ORDER — BENZONATATE 100 MG/1
200 CAPSULE ORAL 3 TIMES DAILY PRN
Qty: 30 CAPSULE | Refills: 0 | Status: SHIPPED | OUTPATIENT
Start: 2018-09-17 | End: 2018-11-02 | Stop reason: ALTCHOICE

## 2018-09-17 RX ORDER — PREDNISONE 20 MG/1
TABLET ORAL
Qty: 10 TABLET | Refills: 0 | Status: SHIPPED | OUTPATIENT
Start: 2018-09-17 | End: 2018-11-02 | Stop reason: ALTCHOICE

## 2018-09-17 NOTE — PROGRESS NOTES
CHIEF COMPLAINT:   Patient presents with:  Cough: cough is with phlegm, runny nose,  nose congestion, sinus pressure, sore throat x 5 dys       HPI:   Garrett Strauss is a 78year old female who presents for sinus congestion for  6  days.  Pt developed s Psyllium (TGT PSYLLIUM FIBER) 0.52 G Oral Cap Take by mouth 2 (two) times daily. Disp:  Rfl:    Ascorbic Acid (VITAMIN C) 500 MG Oral Cap Take 1,000 mg by mouth. Disp:  Rfl:    Aspirin (ASPIR-81 OR) Take  by mouth daily.  Disp:  Rfl:    MULTI-VITAMIN OR N intact  EARS: TM's pearly, no bulging, no retraction, no fluid, bony landmarks present  NOSE: nostrils patent, thick, cloudy nasal mucous, nasal mucosa reddened and boggy  THROAT: oral mucosa pink, moist. No visible dental caries.  Posterior oropharynx is e eucalpytus oil to chest and feet at bedtime to reduce chest and nasal congestion. Monitor symptoms and contact the office if no better in 2-3 days. The patient indicates understanding of these issues and agrees to the plan.

## 2018-09-17 NOTE — PATIENT INSTRUCTIONS
Take antibiotics with food and plenty of water. Eat yogurt or take probiotic daily. (Ora Saliva is a good example of an OTC probiotic)  Make sure to finish the entire antibiotic treatment. Take prednisone-- 2 tabs once daily for 5 days. Take with food.    Yesenia Hutchinson

## 2018-09-18 ENCOUNTER — HOSPITAL ENCOUNTER (OUTPATIENT)
Age: 78
Discharge: HOME | End: 2018-09-18
Attending: FAMILY MEDICINE
Payer: MEDICARE

## 2018-09-18 ENCOUNTER — APPOINTMENT (OUTPATIENT)
Dept: GENERAL RADIOLOGY | Age: 78
End: 2018-09-18
Attending: FAMILY MEDICINE
Payer: MEDICARE

## 2018-09-18 VITALS
WEIGHT: 175 LBS | TEMPERATURE: 98 F | BODY MASS INDEX: 31.01 KG/M2 | HEIGHT: 63 IN | SYSTOLIC BLOOD PRESSURE: 138 MMHG | DIASTOLIC BLOOD PRESSURE: 76 MMHG | HEART RATE: 98 BPM | RESPIRATION RATE: 20 BRPM | OXYGEN SATURATION: 96 %

## 2018-09-18 DIAGNOSIS — J40 BRONCHITIS: Primary | ICD-10-CM

## 2018-09-18 PROCEDURE — 71046 X-RAY EXAM CHEST 2 VIEWS: CPT | Performed by: FAMILY MEDICINE

## 2018-09-18 PROCEDURE — 99203 OFFICE O/P NEW LOW 30 MIN: CPT

## 2018-09-18 PROCEDURE — 99213 OFFICE O/P EST LOW 20 MIN: CPT

## 2018-09-18 NOTE — ED INITIAL ASSESSMENT (HPI)
Pt is here for a cough since Wednesday and saw her PCP yesterday and states that she was put on Augmentin, Prednisone and Benzonatate. Pt states she does not feel any better and requests a chest xray. Pt states that she is coughing up greenish mucous.

## 2018-09-20 NOTE — ED PROVIDER NOTES
Patient Seen in: [facility]    History   Patient presents with:  Cough/URI    Stated Complaint: COUGH, CONGESTION, HEADACHE, DIARRHEA    HPI    78year old female presents for cough and congestion.  States she has worsening person, place, and time. She appears well-developed and well-nourished. HENT:   Head: Normocephalic and atraumatic.    Right Ear: External ear normal.   Left Ear: External ear normal.   Nose: Nose normal.   Mouth/Throat: Oropharynx is clear and moist.   E

## 2018-11-01 ENCOUNTER — TELEPHONE (OUTPATIENT)
Dept: FAMILY MEDICINE CLINIC | Facility: CLINIC | Age: 78
End: 2018-11-01

## 2018-11-01 NOTE — TELEPHONE ENCOUNTER
Pt states that she has had urinary frequency for 3 day. Urinary urgency started today. Denies pain with urination. Afebrile. There are no appts available in office today. Pt declined WIC.  Appt scheduled tomorrow at 7:30 with MICHELLE Richardson,

## 2018-11-02 ENCOUNTER — OFFICE VISIT (OUTPATIENT)
Dept: FAMILY MEDICINE CLINIC | Facility: CLINIC | Age: 78
End: 2018-11-02
Payer: COMMERCIAL

## 2018-11-02 VITALS
HEART RATE: 68 BPM | BODY MASS INDEX: 31.54 KG/M2 | RESPIRATION RATE: 16 BRPM | HEIGHT: 63 IN | WEIGHT: 178 LBS | TEMPERATURE: 98 F | SYSTOLIC BLOOD PRESSURE: 110 MMHG | DIASTOLIC BLOOD PRESSURE: 70 MMHG

## 2018-11-02 DIAGNOSIS — R35.0 URINE FREQUENCY: Primary | ICD-10-CM

## 2018-11-02 DIAGNOSIS — N30.01 ACUTE CYSTITIS WITH HEMATURIA: ICD-10-CM

## 2018-11-02 DIAGNOSIS — Z85.3 HISTORY OF BREAST CANCER: ICD-10-CM

## 2018-11-02 DIAGNOSIS — Z91.89 AT RISK FOR DECREASED BONE DENSITY: ICD-10-CM

## 2018-11-02 PROCEDURE — 87086 URINE CULTURE/COLONY COUNT: CPT | Performed by: PHYSICIAN ASSISTANT

## 2018-11-02 PROCEDURE — 87088 URINE BACTERIA CULTURE: CPT | Performed by: PHYSICIAN ASSISTANT

## 2018-11-02 PROCEDURE — 81003 URINALYSIS AUTO W/O SCOPE: CPT | Performed by: PHYSICIAN ASSISTANT

## 2018-11-02 PROCEDURE — 99213 OFFICE O/P EST LOW 20 MIN: CPT | Performed by: PHYSICIAN ASSISTANT

## 2018-11-02 PROCEDURE — 87186 SC STD MICRODIL/AGAR DIL: CPT | Performed by: PHYSICIAN ASSISTANT

## 2018-11-02 RX ORDER — NITROFURANTOIN 25; 75 MG/1; MG/1
100 CAPSULE ORAL 2 TIMES DAILY
Qty: 10 CAPSULE | Refills: 0 | Status: SHIPPED | OUTPATIENT
Start: 2018-11-02 | End: 2018-11-07

## 2018-11-02 RX ORDER — PHENAZOPYRIDINE HYDROCHLORIDE 100 MG/1
100 TABLET, FILM COATED ORAL 3 TIMES DAILY PRN
Qty: 9 TABLET | Refills: 0 | Status: SHIPPED | OUTPATIENT
Start: 2018-11-02 | End: 2018-11-12 | Stop reason: ALTCHOICE

## 2018-11-02 NOTE — PROGRESS NOTES
Patient presents with:  Urinary Frequency: x 4 days       HISTORY OF PRESENT ILLNESS  Ino Westbrook is a 70year old female who presents for evaluation of urinary frequency. This started about 4 days ago.  She has had urinary tract infections in the p thrombocytopenia, CKD stage 3 secondary to diabetes, fatty liver, bilateral cataracts, overactive bladder, hyperlipidemia, hypothyroidism, T2DM, hypertension, major depression, gout, breast cancer, vaginal vault prolapse, GERD    Past Surgical History:   P consistent with acute urinary tract infection. Awaiting urine culture and sensitivities today. Will treat with Macrobid today and let her know this if we need to make any adjustments once culture returns.  Recommend pushing fluids, ibuprofen/ tylenol for pa

## 2018-11-08 ENCOUNTER — HOSPITAL ENCOUNTER (OUTPATIENT)
Dept: BONE DENSITY | Age: 78
Discharge: HOME | End: 2018-11-08
Attending: PHYSICIAN ASSISTANT
Payer: MEDICARE

## 2018-11-08 ENCOUNTER — APPOINTMENT (OUTPATIENT)
Dept: LAB | Age: 78
End: 2018-11-08
Attending: FAMILY MEDICINE
Payer: MEDICARE

## 2018-11-08 DIAGNOSIS — E11.9 DIABETES MELLITUS TYPE 2, DIET-CONTROLLED (HCC): Chronic | ICD-10-CM

## 2018-11-08 DIAGNOSIS — Z91.89 AT RISK FOR DECREASED BONE DENSITY: ICD-10-CM

## 2018-11-08 DIAGNOSIS — E03.9 ACQUIRED HYPOTHYROIDISM: Chronic | ICD-10-CM

## 2018-11-08 DIAGNOSIS — E11.22 CKD STAGE 3 SECONDARY TO DIABETES (HCC): ICD-10-CM

## 2018-11-08 DIAGNOSIS — N18.30 CKD STAGE 3 SECONDARY TO DIABETES (HCC): ICD-10-CM

## 2018-11-08 DIAGNOSIS — Z85.3 HISTORY OF BREAST CANCER: ICD-10-CM

## 2018-11-08 DIAGNOSIS — D69.6 THROMBOCYTOPENIA (HCC): ICD-10-CM

## 2018-11-08 PROCEDURE — 84439 ASSAY OF FREE THYROXINE: CPT

## 2018-11-08 PROCEDURE — 84443 ASSAY THYROID STIM HORMONE: CPT

## 2018-11-08 PROCEDURE — 36415 COLL VENOUS BLD VENIPUNCTURE: CPT

## 2018-11-08 PROCEDURE — 77080 DXA BONE DENSITY AXIAL: CPT | Performed by: PHYSICIAN ASSISTANT

## 2018-11-08 PROCEDURE — 83036 HEMOGLOBIN GLYCOSYLATED A1C: CPT

## 2018-11-08 PROCEDURE — 80053 COMPREHEN METABOLIC PANEL: CPT

## 2018-11-08 PROCEDURE — 85027 COMPLETE CBC AUTOMATED: CPT

## 2018-11-12 ENCOUNTER — OFFICE VISIT (OUTPATIENT)
Dept: FAMILY MEDICINE CLINIC | Facility: CLINIC | Age: 78
End: 2018-11-12
Payer: COMMERCIAL

## 2018-11-12 VITALS
WEIGHT: 179 LBS | TEMPERATURE: 98 F | HEIGHT: 62.5 IN | HEART RATE: 80 BPM | SYSTOLIC BLOOD PRESSURE: 112 MMHG | DIASTOLIC BLOOD PRESSURE: 62 MMHG | BODY MASS INDEX: 32.12 KG/M2 | RESPIRATION RATE: 16 BRPM

## 2018-11-12 DIAGNOSIS — I10 HYPERTENSION GOAL BP (BLOOD PRESSURE) < 130/80: Chronic | ICD-10-CM

## 2018-11-12 DIAGNOSIS — Z51.81 ENCOUNTER FOR THERAPEUTIC DRUG MONITORING: ICD-10-CM

## 2018-11-12 DIAGNOSIS — N18.30 CKD STAGE 3 SECONDARY TO DIABETES (HCC): ICD-10-CM

## 2018-11-12 DIAGNOSIS — E11.9 DIABETES MELLITUS TYPE 2, DIET-CONTROLLED (HCC): Primary | Chronic | ICD-10-CM

## 2018-11-12 DIAGNOSIS — E03.9 ACQUIRED HYPOTHYROIDISM: Chronic | ICD-10-CM

## 2018-11-12 DIAGNOSIS — E11.22 CKD STAGE 3 SECONDARY TO DIABETES (HCC): ICD-10-CM

## 2018-11-12 DIAGNOSIS — E78.5 HYPERLIPIDEMIA WITH TARGET LDL LESS THAN 70: Chronic | ICD-10-CM

## 2018-11-12 DIAGNOSIS — M1A.0720 CHRONIC IDIOPATHIC GOUT INVOLVING TOE OF LEFT FOOT WITHOUT TOPHUS: Chronic | ICD-10-CM

## 2018-11-12 DIAGNOSIS — F32.5 MAJOR DEPRESSION IN COMPLETE REMISSION (HCC): Chronic | ICD-10-CM

## 2018-11-12 PROCEDURE — 99214 OFFICE O/P EST MOD 30 MIN: CPT | Performed by: FAMILY MEDICINE

## 2018-11-12 RX ORDER — TRAZODONE HYDROCHLORIDE 50 MG/1
50 TABLET ORAL NIGHTLY
Qty: 90 TABLET | Refills: 3 | Status: SHIPPED | OUTPATIENT
Start: 2018-11-12 | End: 2019-12-05

## 2018-11-12 RX ORDER — ESCITALOPRAM OXALATE 20 MG/1
20 TABLET ORAL DAILY
Qty: 90 TABLET | Refills: 3 | Status: SHIPPED | OUTPATIENT
Start: 2018-11-12 | End: 2019-12-05

## 2018-11-12 RX ORDER — LOSARTAN POTASSIUM AND HYDROCHLOROTHIAZIDE 12.5; 1 MG/1; MG/1
1 TABLET ORAL
Qty: 90 TABLET | Refills: 3 | Status: SHIPPED | OUTPATIENT
Start: 2018-11-12 | End: 2019-12-05

## 2018-11-12 NOTE — PROGRESS NOTES
HPI:   Patient presents with:  Diabetes    Ino Burnette is a 70year old female who presents for recheck of her diabetes. Subjective    Stable labs.    Lab Results   Component Value Date    A1C 5.6 11/08/2018       Influenza Vaccine(1) due on 09/01/ and breath sounds normal. No respiratory distress. She has no wheezes. Abdominal: Soft. Bowel sounds are normal. There is no tenderness. Musculoskeletal:        Right foot: There is no deformity. Left foot: There is no deformity.    Feet: Delpha Bolivar Medications          Levothyroxine Sodium 75 MCG Oral Tab                 Relevant Orders    TSH+FREE T4    Diabetes mellitus type 2, diet-controlled ([facility] [address] - Primary (Chronic)    Overview     No Meds, Dx 12/2011 with A1c 6.6%         Current Assessment & P

## 2018-11-13 NOTE — ASSESSMENT & PLAN NOTE
Stable labs, stable functino  Lab Results   Component Value Date    URIC 3.2 11/10/2017    URIC 3.4 10/10/2016    WBC 6.1 11/08/2018    CREATSERUM 1.03 (H) 11/08/2018    GFR 63 11/10/2017

## 2018-11-13 NOTE — ASSESSMENT & PLAN NOTE
Stable, Continue present management.     Thyroid  (most recent labs)   Lab Results   Component Value Date/Time    TSH 1.170 11/08/2018 07:44 AM    T4F 1.0 11/08/2018 07:44 AM         Endocrine Medications          Levothyroxine Sodium 75 MCG Oral Tab

## 2018-11-16 ENCOUNTER — TELEPHONE (OUTPATIENT)
Dept: FAMILY MEDICINE CLINIC | Facility: CLINIC | Age: 78
End: 2018-11-16

## 2018-11-16 NOTE — TELEPHONE ENCOUNTER
Pt c/o diarrhea since 6am and vomiting since 11am. No fevers, no abdominal pains. Advised clear liquids- small amounts and increase as tolerating and rest. Reviewed s/s of dehydration.  Advised if symptoms do not start to improve in next 24 hours- Pt to go

## 2018-11-16 NOTE — TELEPHONE ENCOUNTER
Patient believes she has the flu, runny diarrhea since 6:15am and vomiting the past hour, patient would like to know if Dr. Corinne Camilo will give her a prescription for Tamiflu

## 2019-01-27 DIAGNOSIS — M1A.0720 CHRONIC IDIOPATHIC GOUT INVOLVING TOE OF LEFT FOOT WITHOUT TOPHUS: Chronic | ICD-10-CM

## 2019-01-28 ENCOUNTER — TELEPHONE (OUTPATIENT)
Dept: FAMILY MEDICINE CLINIC | Facility: CLINIC | Age: 79
End: 2019-01-28

## 2019-01-28 DIAGNOSIS — M1A.0720 CHRONIC IDIOPATHIC GOUT INVOLVING TOE OF LEFT FOOT WITHOUT TOPHUS: Chronic | ICD-10-CM

## 2019-01-28 RX ORDER — ALLOPURINOL 300 MG/1
TABLET ORAL
Qty: 90 TABLET | Refills: 3 | OUTPATIENT
Start: 2019-01-28

## 2019-01-28 RX ORDER — ALLOPURINOL 300 MG/1
300 TABLET ORAL
Qty: 90 TABLET | Refills: 3 | Status: SHIPPED | OUTPATIENT
Start: 2019-01-28 | End: 2020-01-20

## 2019-01-28 NOTE — TELEPHONE ENCOUNTER
Zeinab Levine from [facility] is calling because we need the Allopurinol to be refilled,  The script was done for one year on 1/23/18 but it is past that date now and he needs the yearly refill for 2019. Please review again.

## 2019-02-01 ENCOUNTER — OFFICE VISIT (OUTPATIENT)
Dept: FAMILY MEDICINE CLINIC | Facility: CLINIC | Age: 79
End: 2019-02-01
Payer: COMMERCIAL

## 2019-02-01 VITALS
OXYGEN SATURATION: 98 % | BODY MASS INDEX: 31.54 KG/M2 | WEIGHT: 178 LBS | SYSTOLIC BLOOD PRESSURE: 122 MMHG | TEMPERATURE: 98 F | HEART RATE: 100 BPM | DIASTOLIC BLOOD PRESSURE: 68 MMHG | HEIGHT: 63 IN

## 2019-02-01 DIAGNOSIS — R05.9 COUGH: Primary | ICD-10-CM

## 2019-02-01 PROCEDURE — 99213 OFFICE O/P EST LOW 20 MIN: CPT | Performed by: FAMILY MEDICINE

## 2019-02-02 ENCOUNTER — TELEPHONE (OUTPATIENT)
Dept: FAMILY MEDICINE CLINIC | Facility: CLINIC | Age: 79
End: 2019-02-02

## 2019-02-02 RX ORDER — AMOXICILLIN AND CLAVULANATE POTASSIUM 875; 125 MG/1; MG/1
1 TABLET, FILM COATED ORAL 2 TIMES DAILY
Qty: 20 TABLET | Refills: 0 | Status: SHIPPED | OUTPATIENT
Start: 2019-02-02 | End: 2019-02-12

## 2019-02-02 NOTE — PATIENT INSTRUCTIONS
Use Delsym for cough. Consider adding Benadryl at bedtime to reduce nasal drainage and promote rest.  You may add claritin/zyrtec in the AM to reduce drainage without sedation.    Consider applying zoila's vapo-rub or eucalpytus oil to chest and feet at bed

## 2019-02-02 NOTE — PROGRESS NOTES
CHIEF COMPLAINT:   Patient presents with:  Cough: cough with phlegm, nose and chest congestion, yellow mucus,runny nose, sore throat, drainage x 3 dys       HPI:   Ayan Llanos is a 70year old female who presents for upper respiratory symptoms for before breakfast. Disp: 90 tablet Rfl: 3   magnesium 250 MG Oral Tab Take 250 mg by mouth daily. Disp:  Rfl:    Psyllium (TGT PSYLLIUM FIBER) 0.52 G Oral Cap Take by mouth 2 (two) times daily.  Disp:  Rfl:    Ascorbic Acid (VITAMIN C) 500 MG Oral Cap Take 1 noretraction,no fluid, bony landmarks present  NOSE: Nostrils patent, clear nasal discharge, nasal mucosa pink and boggy  THROAT: Oral mucosa pink, moist. Posterior pharynx is not erythematous. no exudates. Tonsils 2/4.     NECK: Supple, non-tender  LUNGS:

## 2019-02-22 DIAGNOSIS — E78.5 HYPERLIPIDEMIA WITH TARGET LDL LESS THAN 70: Chronic | ICD-10-CM

## 2019-02-22 RX ORDER — SIMVASTATIN 10 MG
TABLET ORAL
Qty: 90 TABLET | Refills: 0 | Status: SHIPPED | OUTPATIENT
Start: 2019-02-22 | End: 2019-05-16

## 2019-02-22 NOTE — TELEPHONE ENCOUNTER
Medication refilled per protocol. Requested Prescriptions     Signed Prescriptions Disp Refills   • SIMVASTATIN 10 MG Oral Tab 90 tablet 0     Sig: TAKE 1 TABLET BY MOUTH NIGHTLY.      Authorizing Provider: Everton Mcneill     Ordering User: Manju Farley

## 2019-03-23 ENCOUNTER — OFFICE VISIT (OUTPATIENT)
Dept: FAMILY MEDICINE CLINIC | Facility: CLINIC | Age: 79
End: 2019-03-23
Payer: COMMERCIAL

## 2019-03-23 VITALS
OXYGEN SATURATION: 96 % | SYSTOLIC BLOOD PRESSURE: 118 MMHG | HEART RATE: 86 BPM | TEMPERATURE: 98 F | HEIGHT: 63 IN | DIASTOLIC BLOOD PRESSURE: 64 MMHG | BODY MASS INDEX: 31.89 KG/M2 | WEIGHT: 180 LBS

## 2019-03-23 DIAGNOSIS — R09.89 ABNORMAL LUNG SOUNDS: Primary | ICD-10-CM

## 2019-03-23 DIAGNOSIS — J06.9 UPPER RESPIRATORY TRACT INFECTION, UNSPECIFIED TYPE: ICD-10-CM

## 2019-03-23 PROCEDURE — 99213 OFFICE O/P EST LOW 20 MIN: CPT | Performed by: PHYSICIAN ASSISTANT

## 2019-03-23 RX ORDER — AZITHROMYCIN 250 MG/1
TABLET, FILM COATED ORAL
Qty: 6 TABLET | Refills: 0 | Status: SHIPPED | OUTPATIENT
Start: 2019-03-23 | End: 2019-05-16 | Stop reason: ALTCHOICE

## 2019-03-23 NOTE — PATIENT INSTRUCTIONS
Rest   Fluids   Continue Coricidin OTC   If symptoms persist or worsen please go to urgent care   Close follow up with PCP

## 2019-03-23 NOTE — PROGRESS NOTES
CHIEF COMPLAINT:   Patient presents with:  Cough: cough is green phlegm, nose congestion, drainage x 6 dys         HPI:   Piyush De La Garza is a 70year old female who presents for cough for 6 days. Productive cough with green flem.  Cough feels like its 8/2/2017   • S/P lumpectomy, right breast 09/12   • Traumatic rupture of left posterior tibial tendon, initial encounter 12/7/2017      Social History:  Social History    Tobacco Use      Smoking status: Never Smoker      Smokeless tobacco: Never Used    A Side effects, risks, benefits, of medication explained and discussed. The patient indicates understanding of these issues and agrees to the plan.       Patient Instructions   Rest   Fluids   Continue Coricidin OTC   If symptoms persist or worsen plea

## 2019-03-28 ENCOUNTER — HOSPITAL ENCOUNTER (OUTPATIENT)
Age: 79
Discharge: HOME | End: 2019-03-28
Attending: FAMILY MEDICINE
Payer: MEDICARE

## 2019-03-28 ENCOUNTER — APPOINTMENT (OUTPATIENT)
Dept: GENERAL RADIOLOGY | Age: 79
End: 2019-03-28
Payer: MEDICARE

## 2019-03-28 VITALS
HEART RATE: 99 BPM | RESPIRATION RATE: 18 BRPM | HEIGHT: 63 IN | SYSTOLIC BLOOD PRESSURE: 118 MMHG | BODY MASS INDEX: 32.6 KG/M2 | DIASTOLIC BLOOD PRESSURE: 90 MMHG | WEIGHT: 184 LBS | OXYGEN SATURATION: 97 % | TEMPERATURE: 99 F

## 2019-03-28 DIAGNOSIS — R05.9 COUGH: Primary | ICD-10-CM

## 2019-03-28 DIAGNOSIS — J06.9 VIRAL UPPER RESPIRATORY TRACT INFECTION: ICD-10-CM

## 2019-03-28 PROCEDURE — 99213 OFFICE O/P EST LOW 20 MIN: CPT

## 2019-03-28 PROCEDURE — 99214 OFFICE O/P EST MOD 30 MIN: CPT

## 2019-03-28 PROCEDURE — 71046 X-RAY EXAM CHEST 2 VIEWS: CPT

## 2019-03-28 RX ORDER — PREDNISONE 20 MG/1
TABLET ORAL
Qty: 13 TABLET | Refills: 0 | Status: SHIPPED | OUTPATIENT
Start: 2019-03-28 | End: 2019-05-16 | Stop reason: ALTCHOICE

## 2019-03-28 RX ORDER — BENZONATATE 200 MG/1
200 CAPSULE ORAL EVERY 8 HOURS PRN
Qty: 30 CAPSULE | Refills: 0 | Status: SHIPPED | OUTPATIENT
Start: 2019-03-28 | End: 2019-05-16 | Stop reason: ALTCHOICE

## 2019-03-28 RX ORDER — CODEINE PHOSPHATE AND GUAIFENESIN 10; 100 MG/5ML; MG/5ML
5 SOLUTION ORAL EVERY 6 HOURS PRN
Qty: 180 ML | Refills: 0 | Status: SHIPPED | OUTPATIENT
Start: 2019-03-28 | End: 2019-05-16 | Stop reason: ALTCHOICE

## 2019-03-28 NOTE — ED INITIAL ASSESSMENT (HPI)
Cough x 1 week, finished z-pack yesterday. Was seen at [facility] originally for the cough and told to come to IC for re-eval if cough does not improve. Pt states cough is higher in chest now and cough is still productive, still some bodyahces and fatigue.  Pt den

## 2019-03-28 NOTE — ED PROVIDER NOTES
Patient Seen in: [facility]    History   Patient presents with:  Cough/URI    Stated Complaint: COUGH     Cough x 2 weeks. Cough originally was brown sputum. Now its yellow or clear. No F/C. No CP, SOB.  + fatigue.   No exudate. Eyes: Conjunctivae are normal. Right eye exhibits no discharge. Left eye exhibits no discharge. No scleral icterus. Neck: Normal range of motion. Neck supple. No thyromegaly present.    Cardiovascular: Normal rate, regular rhythm, normal heart

## 2019-05-10 ENCOUNTER — TELEPHONE (OUTPATIENT)
Dept: FAMILY MEDICINE CLINIC | Facility: CLINIC | Age: 79
End: 2019-05-10

## 2019-05-13 ENCOUNTER — LAB ENCOUNTER (OUTPATIENT)
Dept: LAB | Age: 79
End: 2019-05-13
Attending: FAMILY MEDICINE
Payer: MEDICARE

## 2019-05-13 DIAGNOSIS — M1A.0720 CHRONIC IDIOPATHIC GOUT INVOLVING TOE OF LEFT FOOT WITHOUT TOPHUS: ICD-10-CM

## 2019-05-13 DIAGNOSIS — N18.30 CKD STAGE 3 SECONDARY TO DIABETES (HCC): ICD-10-CM

## 2019-05-13 DIAGNOSIS — E11.9 DIABETES MELLITUS TYPE 2, DIET-CONTROLLED (HCC): Chronic | ICD-10-CM

## 2019-05-13 DIAGNOSIS — E11.22 CKD STAGE 3 SECONDARY TO DIABETES (HCC): ICD-10-CM

## 2019-05-13 DIAGNOSIS — E03.9 ACQUIRED HYPOTHYROIDISM: Chronic | ICD-10-CM

## 2019-05-13 PROCEDURE — 80053 COMPREHEN METABOLIC PANEL: CPT

## 2019-05-13 PROCEDURE — 82570 ASSAY OF URINE CREATININE: CPT

## 2019-05-13 PROCEDURE — 83036 HEMOGLOBIN GLYCOSYLATED A1C: CPT

## 2019-05-13 PROCEDURE — 80061 LIPID PANEL: CPT

## 2019-05-13 PROCEDURE — 84443 ASSAY THYROID STIM HORMONE: CPT

## 2019-05-13 PROCEDURE — 82043 UR ALBUMIN QUANTITATIVE: CPT

## 2019-05-13 PROCEDURE — 36415 COLL VENOUS BLD VENIPUNCTURE: CPT

## 2019-05-13 PROCEDURE — 85025 COMPLETE CBC W/AUTO DIFF WBC: CPT

## 2019-05-13 PROCEDURE — 84550 ASSAY OF BLOOD/URIC ACID: CPT

## 2019-05-13 PROCEDURE — 84439 ASSAY OF FREE THYROXINE: CPT

## 2019-05-15 ENCOUNTER — MA CHART PREP (OUTPATIENT)
Dept: FAMILY MEDICINE CLINIC | Facility: CLINIC | Age: 79
End: 2019-05-15

## 2019-05-15 PROBLEM — H25.813 COMBINED FORMS OF AGE-RELATED CATARACT, BILATERAL: Status: RESOLVED | Noted: 2017-04-11 | Resolved: 2019-05-15

## 2019-05-15 PROBLEM — I77.9 CAROTID ARTERIAL DISEASE (HCC): Status: ACTIVE | Noted: 2019-05-15

## 2019-05-15 PROBLEM — I77.9 CAROTID ARTERIAL DISEASE: Status: ACTIVE | Noted: 2019-05-15

## 2019-05-16 ENCOUNTER — OFFICE VISIT (OUTPATIENT)
Dept: FAMILY MEDICINE CLINIC | Facility: CLINIC | Age: 79
End: 2019-05-16
Payer: COMMERCIAL

## 2019-05-16 VITALS
HEART RATE: 76 BPM | SYSTOLIC BLOOD PRESSURE: 110 MMHG | BODY MASS INDEX: 32.47 KG/M2 | TEMPERATURE: 99 F | DIASTOLIC BLOOD PRESSURE: 62 MMHG | RESPIRATION RATE: 12 BRPM | WEIGHT: 181 LBS | HEIGHT: 62.5 IN

## 2019-05-16 DIAGNOSIS — Z00.00 ANNUAL PHYSICAL EXAM: Primary | ICD-10-CM

## 2019-05-16 DIAGNOSIS — C50.411 MALIGNANT NEOPLASM OF UPPER-OUTER QUADRANT OF RIGHT BREAST IN FEMALE, ESTROGEN RECEPTOR POSITIVE (HCC): ICD-10-CM

## 2019-05-16 DIAGNOSIS — E03.9 ACQUIRED HYPOTHYROIDISM: Chronic | ICD-10-CM

## 2019-05-16 DIAGNOSIS — E66.9 OBESITY (BMI 30.0-34.9): ICD-10-CM

## 2019-05-16 DIAGNOSIS — M1A.0720 CHRONIC IDIOPATHIC GOUT INVOLVING TOE OF LEFT FOOT WITHOUT TOPHUS: Chronic | ICD-10-CM

## 2019-05-16 DIAGNOSIS — D69.6 THROMBOCYTOPENIA (HCC): ICD-10-CM

## 2019-05-16 DIAGNOSIS — E11.22 CKD STAGE 3 SECONDARY TO DIABETES (HCC): ICD-10-CM

## 2019-05-16 DIAGNOSIS — F32.5 MAJOR DEPRESSION IN COMPLETE REMISSION (HCC): Chronic | ICD-10-CM

## 2019-05-16 DIAGNOSIS — N32.81 OVERACTIVE BLADDER: ICD-10-CM

## 2019-05-16 DIAGNOSIS — I77.9 BILATERAL CAROTID ARTERY DISEASE, UNSPECIFIED TYPE (HCC): ICD-10-CM

## 2019-05-16 DIAGNOSIS — I10 ESSENTIAL HYPERTENSION: Chronic | ICD-10-CM

## 2019-05-16 DIAGNOSIS — E78.5 HYPERLIPIDEMIA WITH TARGET LDL LESS THAN 70: Chronic | ICD-10-CM

## 2019-05-16 DIAGNOSIS — K76.0 FATTY INFILTRATION OF LIVER: ICD-10-CM

## 2019-05-16 DIAGNOSIS — Z00.00 ENCOUNTER FOR ANNUAL HEALTH EXAMINATION: ICD-10-CM

## 2019-05-16 DIAGNOSIS — K21.9 GASTROESOPHAGEAL REFLUX DISEASE WITHOUT ESOPHAGITIS: ICD-10-CM

## 2019-05-16 DIAGNOSIS — Z17.0 MALIGNANT NEOPLASM OF UPPER-OUTER QUADRANT OF RIGHT BREAST IN FEMALE, ESTROGEN RECEPTOR POSITIVE (HCC): ICD-10-CM

## 2019-05-16 DIAGNOSIS — N18.30 CKD STAGE 3 SECONDARY TO DIABETES (HCC): ICD-10-CM

## 2019-05-16 PROCEDURE — G0439 PPPS, SUBSEQ VISIT: HCPCS | Performed by: FAMILY MEDICINE

## 2019-05-16 PROCEDURE — 99397 PER PM REEVAL EST PAT 65+ YR: CPT | Performed by: FAMILY MEDICINE

## 2019-05-16 PROCEDURE — 96160 PT-FOCUSED HLTH RISK ASSMT: CPT | Performed by: FAMILY MEDICINE

## 2019-05-16 RX ORDER — SIMVASTATIN 10 MG
10 TABLET ORAL NIGHTLY
Qty: 90 TABLET | Refills: 3 | Status: SHIPPED | OUTPATIENT
Start: 2019-05-16 | End: 2020-06-01

## 2019-05-16 RX ORDER — NYSTATIN 100000 [USP'U]/G
1 POWDER TOPICAL 2 TIMES DAILY PRN
Qty: 15 G | Refills: 1 | Status: SHIPPED | OUTPATIENT
Start: 2019-05-16

## 2019-05-16 RX ORDER — RANITIDINE 150 MG/1
150 CAPSULE ORAL 2 TIMES DAILY
Qty: 180 CAPSULE | Refills: 3 | Status: SHIPPED | OUTPATIENT
Start: 2019-05-16 | End: 2020-08-13

## 2019-05-16 NOTE — PROGRESS NOTES
HPI:   Nyla Abbott is a 70year old female who presents for a MA (Medicare Advantage) Supervisit (Once per calendar year). Heartburn all winter. Going back on [employer] [place] and needs note.    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 Cholecalciferol (VITAMIN D-3 OR) Take 1 tablet by mouth daily.    Levothyroxine Sodium 75 MCG Oral Tab Take 1 tablet (75 mcg total) by mouth befor Ht 62.5\"   Wt 181 lb   BMI 32.58 kg/m²  Estimated body mass index is 32.58 kg/m² as calculated from the following:    Height as of this encounter: 62.5\". Weight as of this encounter: 181 lb.     Medicare Hearing Assessment  (Required for AWV/SWV)    H guarding. No hernia. Musculoskeletal: Normal range of motion. Lymphadenopathy:     She has no cervical adenopathy. She has no axillary adenopathy. Neurological: She is alert and oriented to person, place, and time.  She has normal strength and nor Assessment & Plan     Stable, Continue present management.     Blood Pressure and Cardiac Medications          Losartan Potassium-HCTZ 100-12.5 MG Oral Tab                    Endocrine    Acquired hypothyroidism (Chronic)    Overview     Levothyroxine 75 (H) 05/13/2019    GFR 63 11/10/2017                   Genitourinary    Overactive bladder    Overview     Myrtbetric 50 daily, urology is Dr Aylin Valdovinos at [address]     Stable in [facility].  Continue present management             Oncologi Internal Lab or Procedure External Lab or Procedure   Diabetes Screening      HbgA1C   Annually HGBA1C (%)   Date Value   05/22/2014 6.0 (H)     HgbA1C (%)   Date Value   05/13/2019 5.8 (H)    No flowsheet data found.     Fasting Blood Sugar (FSB)Annually G Pneumococcal 13 (Prevnar)  Covered Once after 65 03/09/2016 Please get once after your 65th birthday    Pneumococcal 23 (Pneumovax)  Covered Once after 65 11/02/2009 Please get once after your 65th birthday    Hepatitis B for Moderate/High Risk No vaccine flowsheet data found. Dilated Eye exam  Annually Data entered on: 11/14/2018   Last Dilated Eye Exam 11/9/2018     No flowsheet data found.             Template: [facility] AMB MEDICARE ANNUAL ASSESSMENT FEMALE Kanu Penaloza [[id number]]

## 2019-05-16 NOTE — PATIENT INSTRUCTIONS
Ino Cuevas's SCREENING SCHEDULE   Tests on this list are recommended by your physician but may not be covered, or covered at this frequency, by your insurer. Please check with your insurance carrier before scheduling to verify coverage.    PREVEN who meet one of the following criteria:   • Men who are 73-68 years old and have smoked more than 100 cigarettes in their lifetime   • Anyone with a family history    Colorectal Cancer Screening  Covered up to Age 76     Colonoscopy Screen   Covered every care reminders to display for this patient. Please get this Mammogram regularly   Immunizations      Influenza  Covered Annually No orders found for this or any previous visit.  Please get every year    Pneumococcal 13 (Prevnar)  Covered Once after 72 Order [facility] regarding Advance Directives.

## 2019-07-24 DIAGNOSIS — E03.9 ACQUIRED HYPOTHYROIDISM: Chronic | ICD-10-CM

## 2019-07-24 DIAGNOSIS — K21.9 GASTROESOPHAGEAL REFLUX DISEASE WITHOUT ESOPHAGITIS: ICD-10-CM

## 2019-07-26 RX ORDER — PANTOPRAZOLE SODIUM 40 MG/1
40 TABLET, DELAYED RELEASE ORAL
Qty: 90 TABLET | Refills: 1 | Status: SHIPPED | OUTPATIENT
Start: 2019-07-26 | End: 2020-01-24

## 2019-07-26 RX ORDER — LEVOTHYROXINE SODIUM 0.07 MG/1
75 TABLET ORAL
Qty: 90 TABLET | Refills: 1 | Status: SHIPPED | OUTPATIENT
Start: 2019-07-26 | End: 2020-01-22

## 2019-07-26 NOTE — TELEPHONE ENCOUNTER
Requested Prescriptions     Pending Prescriptions Disp Refills   • PANTOPRAZOLE SODIUM 40 MG Oral Tab EC [Pharmacy Med Name: PANTOPRAZOLE 40MG TAB TABLET] 90 tablet 3     Sig: TAKE 1 TABLET (40 MG TOTAL) BY MOUTH EVERY MORNING BEFORE BREAKFAST.    • LEVOTHY

## 2019-09-10 DIAGNOSIS — N32.81 OVERACTIVE BLADDER: ICD-10-CM

## 2019-09-11 RX ORDER — MIRABEGRON 50 MG/1
TABLET, FILM COATED, EXTENDED RELEASE ORAL
Qty: 90 TABLET | Refills: 3 | Status: SHIPPED | OUTPATIENT
Start: 2019-09-11 | End: 2020-07-20

## 2019-09-11 NOTE — TELEPHONE ENCOUNTER
LOV 5/16/2019     Patient was asked to follow-up in: 6 months    Appointment scheduled: 12/11/2019 Shruthi Marques MD     Refill request for:    Requested Prescriptions     Pending Prescriptions Disp Refills   • MYRBETRIQ 50 MG Oral Tablet 24 Hr [Pharmacy Me

## 2019-12-04 ENCOUNTER — OFFICE VISIT (OUTPATIENT)
Dept: FAMILY MEDICINE CLINIC | Facility: CLINIC | Age: 79
End: 2019-12-04
Payer: COMMERCIAL

## 2019-12-04 VITALS
OXYGEN SATURATION: 99 % | HEART RATE: 76 BPM | HEIGHT: 63 IN | BODY MASS INDEX: 26.58 KG/M2 | SYSTOLIC BLOOD PRESSURE: 122 MMHG | DIASTOLIC BLOOD PRESSURE: 72 MMHG | WEIGHT: 150 LBS | TEMPERATURE: 98 F

## 2019-12-04 DIAGNOSIS — J06.9 VIRAL UPPER RESPIRATORY TRACT INFECTION: Primary | ICD-10-CM

## 2019-12-04 PROCEDURE — 99213 OFFICE O/P EST LOW 20 MIN: CPT | Performed by: FAMILY MEDICINE

## 2019-12-05 DIAGNOSIS — Z51.81 ENCOUNTER FOR THERAPEUTIC DRUG MONITORING: ICD-10-CM

## 2019-12-05 DIAGNOSIS — F32.5 MAJOR DEPRESSION IN COMPLETE REMISSION (HCC): Chronic | ICD-10-CM

## 2019-12-05 DIAGNOSIS — I10 HYPERTENSION GOAL BP (BLOOD PRESSURE) < 130/80: Chronic | ICD-10-CM

## 2019-12-05 NOTE — PATIENT INSTRUCTIONS
Most viral illnesses get worse for the first 3-5 days, then plateau and improve gradually over the next 3-5 days. Monitor symptoms for now. Use otc meds for comfort as needed--  Delsym for cough  Benadryl at bedtime to reduce drainage.    Consider apply

## 2019-12-05 NOTE — PROGRESS NOTES
CHIEF COMPLAINT:   Patient presents with:  Cough: cough is dry, nose congestion, sore throat, drainage x  4 dys       HPI:   Virginie Ventura is a 70year old female who presents for upper respiratory symptoms for  4 days.  Patient reports sore throat, c (two) times daily. (Patient not taking: Reported on 12/4/2019 ), Disp: 180 capsule, Rfl: 3  escitalopram 20 MG Oral Tab, Take 1 tablet (20 mg total) by mouth daily. , Disp: 90 tablet, Rfl: 3    No current facility-administered medications for this visit. erythematous. no exudates. Tonsils 2/4. NECK: Supple, non-tender  LUNGS: clear to auscultation bilaterally, no wheezes or rhonchi. Normal respiratory effort without retraction.   CARDIO: RRR without murmur  EXTREMITIES: no cyanosis, clubbing or edema  LY

## 2019-12-06 ENCOUNTER — OFFICE VISIT (OUTPATIENT)
Dept: FAMILY MEDICINE CLINIC | Facility: CLINIC | Age: 79
End: 2019-12-06
Payer: COMMERCIAL

## 2019-12-06 VITALS
BODY MASS INDEX: 26.58 KG/M2 | HEART RATE: 91 BPM | TEMPERATURE: 99 F | WEIGHT: 150 LBS | HEIGHT: 63 IN | SYSTOLIC BLOOD PRESSURE: 114 MMHG | RESPIRATION RATE: 18 BRPM | OXYGEN SATURATION: 99 % | DIASTOLIC BLOOD PRESSURE: 74 MMHG

## 2019-12-06 DIAGNOSIS — J01.00 ACUTE NON-RECURRENT MAXILLARY SINUSITIS: ICD-10-CM

## 2019-12-06 DIAGNOSIS — I10 HYPERTENSION GOAL BP (BLOOD PRESSURE) < 130/80: Chronic | ICD-10-CM

## 2019-12-06 DIAGNOSIS — J02.9 SORE THROAT: Primary | ICD-10-CM

## 2019-12-06 DIAGNOSIS — H10.33 ACUTE BACTERIAL CONJUNCTIVITIS OF BOTH EYES: ICD-10-CM

## 2019-12-06 PROCEDURE — 87880 STREP A ASSAY W/OPTIC: CPT | Performed by: FAMILY MEDICINE

## 2019-12-06 PROCEDURE — 99213 OFFICE O/P EST LOW 20 MIN: CPT | Performed by: FAMILY MEDICINE

## 2019-12-06 RX ORDER — LOSARTAN POTASSIUM 100 MG/1
100 TABLET ORAL DAILY
Qty: 90 TABLET | Refills: 3 | Status: SHIPPED | OUTPATIENT
Start: 2019-12-06 | End: 2020-12-09

## 2019-12-06 RX ORDER — LOSARTAN POTASSIUM AND HYDROCHLOROTHIAZIDE 12.5; 1 MG/1; MG/1
1 TABLET ORAL
Qty: 90 TABLET | Refills: 3 | Status: SHIPPED | OUTPATIENT
Start: 2019-12-06 | End: 2019-12-06 | Stop reason: RX

## 2019-12-06 RX ORDER — TRAZODONE HYDROCHLORIDE 50 MG/1
50 TABLET ORAL NIGHTLY
Qty: 90 TABLET | Refills: 4 | Status: SHIPPED | OUTPATIENT
Start: 2019-12-06 | End: 2021-03-09

## 2019-12-06 RX ORDER — LOSARTAN POTASSIUM AND HYDROCHLOROTHIAZIDE 12.5; 1 MG/1; MG/1
1 TABLET ORAL
Qty: 90 TABLET | Refills: 3 | OUTPATIENT
Start: 2019-12-06

## 2019-12-06 RX ORDER — ESCITALOPRAM OXALATE 20 MG/1
20 TABLET ORAL DAILY
Qty: 90 TABLET | Refills: 4 | Status: SHIPPED | OUTPATIENT
Start: 2019-12-06 | End: 2021-03-09

## 2019-12-06 RX ORDER — HYDROCHLOROTHIAZIDE 12.5 MG/1
12.5 TABLET ORAL DAILY
Qty: 90 TABLET | Refills: 3 | Status: SHIPPED | OUTPATIENT
Start: 2019-12-06 | End: 2020-12-09

## 2019-12-06 RX ORDER — TOBRAMYCIN 3 MG/ML
2 SOLUTION/ DROPS OPHTHALMIC EVERY 4 HOURS
Qty: 5 ML | Refills: 0 | Status: SHIPPED | OUTPATIENT
Start: 2019-12-06 | End: 2019-12-13

## 2019-12-06 RX ORDER — AMOXICILLIN AND CLAVULANATE POTASSIUM 875; 125 MG/1; MG/1
1 TABLET, FILM COATED ORAL 2 TIMES DAILY
Qty: 20 TABLET | Refills: 0 | Status: SHIPPED | OUTPATIENT
Start: 2019-12-06 | End: 2019-12-16

## 2019-12-06 NOTE — PROGRESS NOTES
CHIEF COMPLAINT:   Patient presents with:  Runny Nose: runny nose, sore throat,       HPI:   Dorota Jerez is a 70year old female who presents for sinus congestion for  1  weeks. Was seen 2 days ago- hadn't taken any otc meds at that time.  Has tried 180391 UNIT/GM External Powder Apply 1 Application topically 2 (two) times daily as needed (rash in skin folds). 15 g 1   • simvastatin 10 MG Oral Tab Take 1 tablet (10 mg total) by mouth nightly.  90 tablet 3   • allopurinol 300 MG Oral Tab Take 1 tablet ( abnormal skin lesions  HEENT: See HPI. LUNGS: denies shortness of breath or wheezing, See HPI  CARDIOVASCULAR: denies chest pain or palpitations   GI: denies N/V/C or abdominal pain  NEURO: + sinus headaches. No numbness or tingling in face.     EXAM: explained. Patient Instructions   Take antibiotics with food and plenty of water. Eat yogurt or take probiotic daily. (Yolanda Leblanc is a good example of an OTC probiotic)  Make sure to finish the entire antibiotic treatment.   Increase fluids and rest.   Use

## 2019-12-06 NOTE — PATIENT INSTRUCTIONS
Take antibiotics with food and plenty of water. Eat yogurt or take probiotic daily. (Ofelia Rodriguez is a good example of an OTC probiotic)  Make sure to finish the entire antibiotic treatment.   Increase fluids and rest.   Use Rx Benzonatate for cough as needed- 1

## 2019-12-06 NOTE — TELEPHONE ENCOUNTER
LOV 5/16/2019     Patient was asked to follow-up in: 6 months    Appointment scheduled: 12/11/2019 Yesica Carrillo MD     Refill request for:    Requested Prescriptions     Pending Prescriptions Disp Refills   • TRAZODONE HCL 50 MG Oral Tab [facility]

## 2019-12-27 ENCOUNTER — TELEPHONE (OUTPATIENT)
Dept: FAMILY MEDICINE CLINIC | Facility: CLINIC | Age: 79
End: 2019-12-27

## 2019-12-27 NOTE — TELEPHONE ENCOUNTER
Patient called states has an appointment for 01/09/20 with Dr Donavan Severe and usually has blood work prior, wants to know if lab orders can be entered?

## 2019-12-28 NOTE — TELEPHONE ENCOUNTER
Looks like she is only due for A1c which we can do in clinic. Please let her know that she will not need to get anything done ahead of time and can be discussed with Dr. Nicole Galindo at the appt.     Thanks,  Geovanna Bernard

## 2020-01-13 ENCOUNTER — HOSPITAL ENCOUNTER (OUTPATIENT)
Dept: ULTRASOUND IMAGING | Age: 80
Discharge: HOME | End: 2020-01-13
Attending: FAMILY MEDICINE
Payer: MEDICARE

## 2020-01-13 DIAGNOSIS — K76.0 FATTY INFILTRATION OF LIVER: ICD-10-CM

## 2020-01-13 PROCEDURE — 76700 US EXAM ABDOM COMPLETE: CPT | Performed by: FAMILY MEDICINE

## 2020-01-20 ENCOUNTER — OFFICE VISIT (OUTPATIENT)
Dept: FAMILY MEDICINE CLINIC | Facility: CLINIC | Age: 80
End: 2020-01-20
Payer: COMMERCIAL

## 2020-01-20 VITALS
BODY MASS INDEX: 26.46 KG/M2 | SYSTOLIC BLOOD PRESSURE: 130 MMHG | DIASTOLIC BLOOD PRESSURE: 80 MMHG | WEIGHT: 155 LBS | HEIGHT: 64 IN | TEMPERATURE: 98 F | HEART RATE: 72 BPM | RESPIRATION RATE: 14 BRPM

## 2020-01-20 DIAGNOSIS — M1A.0720 CHRONIC IDIOPATHIC GOUT INVOLVING TOE OF LEFT FOOT WITHOUT TOPHUS: Chronic | ICD-10-CM

## 2020-01-20 DIAGNOSIS — Z00.00 LABORATORY EXAMINATION ORDERED AS PART OF A ROUTINE GENERAL MEDICAL EXAMINATION: ICD-10-CM

## 2020-01-20 DIAGNOSIS — I10 ESSENTIAL HYPERTENSION: Chronic | ICD-10-CM

## 2020-01-20 DIAGNOSIS — C50.411 MALIGNANT NEOPLASM OF UPPER-OUTER QUADRANT OF RIGHT BREAST IN FEMALE, ESTROGEN RECEPTOR POSITIVE (HCC): ICD-10-CM

## 2020-01-20 DIAGNOSIS — N18.30 CKD STAGE 3 SECONDARY TO DIABETES (HCC): ICD-10-CM

## 2020-01-20 DIAGNOSIS — F32.5 MAJOR DEPRESSION IN COMPLETE REMISSION (HCC): Chronic | ICD-10-CM

## 2020-01-20 DIAGNOSIS — E78.2 MIXED HYPERLIPIDEMIA: ICD-10-CM

## 2020-01-20 DIAGNOSIS — E11.9 DIABETES MELLITUS TYPE 2, DIET-CONTROLLED (HCC): Primary | Chronic | ICD-10-CM

## 2020-01-20 DIAGNOSIS — I77.9 BILATERAL CAROTID ARTERY DISEASE, UNSPECIFIED TYPE (HCC): ICD-10-CM

## 2020-01-20 DIAGNOSIS — Z17.0 MALIGNANT NEOPLASM OF UPPER-OUTER QUADRANT OF RIGHT BREAST IN FEMALE, ESTROGEN RECEPTOR POSITIVE (HCC): ICD-10-CM

## 2020-01-20 DIAGNOSIS — E03.9 ACQUIRED HYPOTHYROIDISM: Chronic | ICD-10-CM

## 2020-01-20 DIAGNOSIS — D69.6 THROMBOCYTOPENIA (HCC): ICD-10-CM

## 2020-01-20 DIAGNOSIS — E11.22 CKD STAGE 3 SECONDARY TO DIABETES (HCC): ICD-10-CM

## 2020-01-20 DIAGNOSIS — R73.9 HYPERGLYCEMIA: ICD-10-CM

## 2020-01-20 PROCEDURE — 99214 OFFICE O/P EST MOD 30 MIN: CPT | Performed by: FAMILY MEDICINE

## 2020-01-20 RX ORDER — ALLOPURINOL 300 MG/1
300 TABLET ORAL
Qty: 90 TABLET | Refills: 3 | Status: SHIPPED | OUTPATIENT
Start: 2020-01-20 | End: 2021-01-13

## 2020-01-20 RX ORDER — ALLOPURINOL 300 MG/1
300 TABLET ORAL
Qty: 90 TABLET | Refills: 3 | OUTPATIENT
Start: 2020-01-20

## 2020-01-20 NOTE — PROGRESS NOTES
HPI:   Patient presents with:  Diabetes: f/u   Test Results: US of the abdomen     Ino Mcclain is a 70year old female who presents for recheck of her diabetes.   Subjective    Doing well, intermittent URI since 11/2019, and still not 100 %  Lab Res place, and time and obese. She appears well-developed and well-nourished. No distress. HENT:   Head: Normocephalic. Neck: Normal range of motion. Cardiovascular: Normal rate, regular rhythm, S1 normal, S2 normal and normal heart sounds.     No murmur secondary to diabetes ([facility]    Overview     GFR 53, diet controlled Diabetes, losartan -12.5         Current Assessment & Plan     Stable function.  Reassess soon         Diabetes mellitus type 2, diet-controlled ([address] - Primary (Chronic)    Overview since 2016, unclear source         Current Assessment & Plan     Last Platelet value was 520 done 5/13/2019. Lowest level in the last 10 years was 126. stable function.  Continue present management          Relevant Orders    CBC, PLATELET; NO DIFFERENTIAL

## 2020-01-21 ENCOUNTER — TELEPHONE (OUTPATIENT)
Dept: FAMILY MEDICINE CLINIC | Facility: CLINIC | Age: 80
End: 2020-01-21

## 2020-01-21 ENCOUNTER — APPOINTMENT (OUTPATIENT)
Dept: LAB | Age: 80
End: 2020-01-21
Attending: FAMILY MEDICINE
Payer: MEDICARE

## 2020-01-21 DIAGNOSIS — E11.9 DIABETES MELLITUS TYPE 2, DIET-CONTROLLED (HCC): Chronic | ICD-10-CM

## 2020-01-21 DIAGNOSIS — D69.6 THROMBOCYTOPENIA (HCC): ICD-10-CM

## 2020-01-21 DIAGNOSIS — Z00.00 LABORATORY EXAMINATION ORDERED AS PART OF A ROUTINE GENERAL MEDICAL EXAMINATION: ICD-10-CM

## 2020-01-21 DIAGNOSIS — M1A.0720 CHRONIC IDIOPATHIC GOUT INVOLVING TOE OF LEFT FOOT WITHOUT TOPHUS: Chronic | ICD-10-CM

## 2020-01-21 LAB
ALBUMIN SERPL-MCNC: 3.5 G/DL (ref 3.4–5)
ALBUMIN/GLOB SERPL: 1.2 {RATIO} (ref 1–2)
ALP LIVER SERPL-CCNC: 80 U/L (ref 55–142)
ALT SERPL-CCNC: 15 U/L (ref 13–56)
ANION GAP SERPL CALC-SCNC: 7 MMOL/L (ref 0–18)
AST SERPL-CCNC: 20 U/L (ref 15–37)
BILIRUB SERPL-MCNC: 0.7 MG/DL (ref 0.1–2)
BUN BLD-MCNC: 17 MG/DL (ref 7–18)
BUN/CREAT SERPL: 15.6 (ref 10–20)
CALCIUM BLD-MCNC: 9.2 MG/DL (ref 8.5–10.1)
CHLORIDE SERPL-SCNC: 108 MMOL/L (ref 98–112)
CHOLEST SMN-MCNC: 154 MG/DL (ref ?–200)
CO2 SERPL-SCNC: 29 MMOL/L (ref 21–32)
CREAT BLD-MCNC: 1.09 MG/DL (ref 0.55–1.02)
CREAT UR-SCNC: 179 MG/DL
DEPRECATED RDW RBC AUTO: 48 FL (ref 35.1–46.3)
ERYTHROCYTE [DISTWIDTH] IN BLOOD BY AUTOMATED COUNT: 13.3 % (ref 11–15)
EST. AVERAGE GLUCOSE BLD GHB EST-MCNC: 111 MG/DL (ref 68–126)
GLOBULIN PLAS-MCNC: 3 G/DL (ref 2.8–4.4)
GLUCOSE BLD-MCNC: 80 MG/DL (ref 70–99)
HBA1C MFR BLD HPLC: 5.5 % (ref ?–5.7)
HCT VFR BLD AUTO: 39.3 % (ref 35–48)
HDLC SERPL-MCNC: 61 MG/DL (ref 40–59)
HGB BLD-MCNC: 12.5 G/DL (ref 12–16)
LDLC SERPL CALC-MCNC: 79 MG/DL (ref ?–100)
M PROTEIN MFR SERPL ELPH: 6.5 G/DL (ref 6.4–8.2)
MCH RBC QN AUTO: 31 PG (ref 26–34)
MCHC RBC AUTO-ENTMCNC: 31.8 G/DL (ref 31–37)
MCV RBC AUTO: 97.5 FL (ref 80–100)
MICROALBUMIN UR-MCNC: 0.9 MG/DL
MICROALBUMIN/CREAT 24H UR-RTO: 5 UG/MG (ref ?–30)
NONHDLC SERPL-MCNC: 93 MG/DL (ref ?–130)
OSMOLALITY SERPL CALC.SUM OF ELEC: 299 MOSM/KG (ref 275–295)
PATIENT FASTING Y/N/NP: YES
PATIENT FASTING Y/N/NP: YES
PLATELET # BLD AUTO: 157 10(3)UL (ref 150–450)
POTASSIUM SERPL-SCNC: 3.6 MMOL/L (ref 3.5–5.1)
RBC # BLD AUTO: 4.03 X10(6)UL (ref 3.8–5.3)
SODIUM SERPL-SCNC: 144 MMOL/L (ref 136–145)
TRIGL SERPL-MCNC: 70 MG/DL (ref 30–149)
TSI SER-ACNC: 0.78 MIU/ML (ref 0.36–3.74)
URATE SERPL-MCNC: 3.4 MG/DL (ref 2.6–6)
VLDLC SERPL CALC-MCNC: 14 MG/DL (ref 0–30)
WBC # BLD AUTO: 5.4 X10(3) UL (ref 4–11)

## 2020-01-21 PROCEDURE — 85027 COMPLETE CBC AUTOMATED: CPT

## 2020-01-21 PROCEDURE — 80061 LIPID PANEL: CPT

## 2020-01-21 PROCEDURE — 84443 ASSAY THYROID STIM HORMONE: CPT

## 2020-01-21 PROCEDURE — 84550 ASSAY OF BLOOD/URIC ACID: CPT

## 2020-01-21 PROCEDURE — 82570 ASSAY OF URINE CREATININE: CPT

## 2020-01-21 PROCEDURE — 82043 UR ALBUMIN QUANTITATIVE: CPT

## 2020-01-21 PROCEDURE — 83036 HEMOGLOBIN GLYCOSYLATED A1C: CPT

## 2020-01-21 PROCEDURE — 80053 COMPREHEN METABOLIC PANEL: CPT

## 2020-01-21 PROCEDURE — 36415 COLL VENOUS BLD VENIPUNCTURE: CPT

## 2020-01-21 NOTE — ASSESSMENT & PLAN NOTE
Last Platelet value was 059 done 5/13/2019. Lowest level in the last 10 years was 126. stable function.  Continue present management

## 2020-01-21 NOTE — ASSESSMENT & PLAN NOTE
Stable, Continue present management.     Blood Pressure and Cardiac Medications          losartan 100 MG Oral Tab

## 2020-01-21 NOTE — TELEPHONE ENCOUNTER
Please call [facility], Dr Rojas Mello and get a copy of patient's diabetic eye exam  Thank you    Phone [phone]

## 2020-01-21 NOTE — ASSESSMENT & PLAN NOTE
Stable, Continue present management.     Thyroid  (most recent labs)   Lab Results   Component Value Date/Time    TSH 0.906 05/13/2019 07:43 AM    T4F 1.1 05/13/2019 07:43 AM         Endocrine Medications          LEVOTHYROXINE SODIUM 75 MCG Oral Tab

## 2020-01-21 NOTE — ASSESSMENT & PLAN NOTE
Stable function.  Continue present management   Lab Results   Component Value Date    URIC 3.7 05/13/2019    URIC 3.2 11/10/2017    WBC 5.8 05/13/2019    CREATSERUM 1.09 (H) 05/13/2019    GFR 63 11/10/2017

## 2020-01-21 NOTE — TELEPHONE ENCOUNTER
Spoke to Gurjit Mayers at Dr. Karena De La Garza office, she will be faxing over eye exam from Nov 2019.

## 2020-01-21 NOTE — TELEPHONE ENCOUNTER
Dr Rachna Irby at [address] [place] [address] eye did DM eye eamx 11/2019   Received: Candelario Coates MD              Can we call over to get Diabetes Mellitus eye exam

## 2020-01-22 DIAGNOSIS — E03.9 ACQUIRED HYPOTHYROIDISM: Chronic | ICD-10-CM

## 2020-01-22 RX ORDER — LEVOTHYROXINE SODIUM 0.07 MG/1
75 TABLET ORAL
Qty: 90 TABLET | Refills: 1 | Status: SHIPPED | OUTPATIENT
Start: 2020-01-22 | End: 2020-07-20

## 2020-01-24 DIAGNOSIS — K21.9 GASTROESOPHAGEAL REFLUX DISEASE WITHOUT ESOPHAGITIS: ICD-10-CM

## 2020-01-24 RX ORDER — PANTOPRAZOLE SODIUM 40 MG/1
40 TABLET, DELAYED RELEASE ORAL
Qty: 90 TABLET | Refills: 1 | Status: SHIPPED | OUTPATIENT
Start: 2020-01-24 | End: 2020-03-25

## 2020-01-24 NOTE — TELEPHONE ENCOUNTER
Refill request for:    Requested Prescriptions     Pending Prescriptions Disp Refills   • PANTOPRAZOLE SODIUM 40 MG Oral Tab EC [Pharmacy Med Name: PANTOPRAZOLE SODIUM 40 MG TABLET DR] 90 tablet 1     Sig: TAKE 1 TABLET (40 MG TOTAL) BY MOUTH EVERY MORNING

## 2020-02-24 ENCOUNTER — TELEPHONE (OUTPATIENT)
Dept: FAMILY MEDICINE CLINIC | Facility: CLINIC | Age: 80
End: 2020-02-24

## 2020-02-24 DIAGNOSIS — N63.20 LUMP OF BREAST, LEFT: Primary | ICD-10-CM

## 2020-02-24 NOTE — TELEPHONE ENCOUNTER
Patient found a lump in her left breast she would like an order for a mammogram. Previous breast cancer in right breast.

## 2020-02-28 ENCOUNTER — HOSPITAL ENCOUNTER (OUTPATIENT)
Dept: MAMMOGRAPHY | Facility: HOSPITAL | Age: 80
Discharge: HOME | End: 2020-02-28
Attending: FAMILY MEDICINE
Payer: MEDICARE

## 2020-02-28 DIAGNOSIS — N63.20 LUMP OF BREAST, LEFT: ICD-10-CM

## 2020-02-28 PROCEDURE — 77066 DX MAMMO INCL CAD BI: CPT | Performed by: FAMILY MEDICINE

## 2020-02-28 PROCEDURE — 77062 BREAST TOMOSYNTHESIS BI: CPT | Performed by: FAMILY MEDICINE

## 2020-02-28 PROCEDURE — 76642 ULTRASOUND BREAST LIMITED: CPT | Performed by: FAMILY MEDICINE

## 2020-03-25 DIAGNOSIS — K21.9 GASTROESOPHAGEAL REFLUX DISEASE WITHOUT ESOPHAGITIS: ICD-10-CM

## 2020-03-25 RX ORDER — PANTOPRAZOLE SODIUM 40 MG/1
40 TABLET, DELAYED RELEASE ORAL
Qty: 180 TABLET | Refills: 1 | Status: SHIPPED | OUTPATIENT
Start: 2020-03-25 | End: 2020-08-13

## 2020-03-25 NOTE — TELEPHONE ENCOUNTER
Patient called to request a refill on PANTOPRAZOLE SODIUM 40 MG Oral Tab EC. Preferred Pharmacy: [facility]    Patient stated instructions are only to take 1 tab by mouth every morning before breakfast. However patient takes medication twice a day.

## 2020-05-04 ENCOUNTER — VIRTUAL PHONE E/M (OUTPATIENT)
Dept: FAMILY MEDICINE CLINIC | Facility: CLINIC | Age: 80
End: 2020-05-04
Payer: COMMERCIAL

## 2020-05-04 DIAGNOSIS — R30.0 DYSURIA: Primary | ICD-10-CM

## 2020-05-04 PROCEDURE — 99441 PHONE E/M BY PHYS 5-10 MIN: CPT | Performed by: FAMILY MEDICINE

## 2020-05-04 RX ORDER — NITROFURANTOIN 25; 75 MG/1; MG/1
100 CAPSULE ORAL 2 TIMES DAILY
Qty: 10 CAPSULE | Refills: 0 | Status: SHIPPED | OUTPATIENT
Start: 2020-05-04 | End: 2021-10-15 | Stop reason: ALTCHOICE

## 2020-05-04 NOTE — PROGRESS NOTES
Virtual Telephone Check-In    Ino Cook verbally consents to a Virtual/Telephone Check-In visit on 05/04/20. Patient understands and accepts financial responsibility for any deductible, co-insurance and/or co-pays associated with this service.

## 2020-06-01 DIAGNOSIS — E78.5 HYPERLIPIDEMIA WITH TARGET LDL LESS THAN 70: Chronic | ICD-10-CM

## 2020-06-01 RX ORDER — SIMVASTATIN 10 MG
10 TABLET ORAL NIGHTLY
Qty: 90 TABLET | Refills: 0 | Status: SHIPPED | OUTPATIENT
Start: 2020-06-01 | End: 2020-10-13

## 2020-06-01 NOTE — TELEPHONE ENCOUNTER
Requested Prescriptions     Pending Prescriptions Disp Refills   • SIMVASTATIN 10 MG Oral Tab [Pharmacy Med Name: simvastatin 10 mg tablet] 90 tablet 3     Sig: TAKE 1 TABLET (10 MG TOTAL) BY MOUTH NIGHTLY.      LOV 1/20/2020     Patient was asked to follow

## 2020-06-27 ENCOUNTER — MA CHART PREP (OUTPATIENT)
Dept: FAMILY MEDICINE CLINIC | Facility: CLINIC | Age: 80
End: 2020-06-27

## 2020-07-20 ENCOUNTER — TELEPHONE (OUTPATIENT)
Dept: FAMILY MEDICINE CLINIC | Facility: CLINIC | Age: 80
End: 2020-07-20

## 2020-07-20 DIAGNOSIS — N32.81 OVERACTIVE BLADDER: ICD-10-CM

## 2020-07-20 DIAGNOSIS — E03.9 ACQUIRED HYPOTHYROIDISM: Chronic | ICD-10-CM

## 2020-07-20 RX ORDER — LEVOTHYROXINE SODIUM 0.07 MG/1
TABLET ORAL
Qty: 90 TABLET | Refills: 1 | Status: SHIPPED | OUTPATIENT
Start: 2020-07-20 | End: 2021-01-13

## 2020-07-20 RX ORDER — MIRABEGRON 50 MG/1
TABLET, FILM COATED, EXTENDED RELEASE ORAL
Qty: 90 TABLET | Refills: 0 | Status: SHIPPED | OUTPATIENT
Start: 2020-07-20 | End: 2020-12-09

## 2020-07-20 NOTE — TELEPHONE ENCOUNTER
Refill request for:    Requested Prescriptions     Pending Prescriptions Disp Refills   • MYRBETRIQ 50 MG Oral Tablet 24 Hr [Pharmacy Med Name: Myrbetriq 50 mg tablet,extended release] 90 tablet 3     Sig: TAKE ONE TABLET BY MOUTH DAILY.      Signed Prescri

## 2020-07-20 NOTE — TELEPHONE ENCOUNTER
Called patient to notify of refill and to schedule MA Supervisit, states at this time does not feel comfortable coming into the office due to the pandemic, will call at a later time to schedule.

## 2020-07-20 NOTE — TELEPHONE ENCOUNTER
Requested Prescriptions     Pending Prescriptions Disp Refills   • MYRBETRIQ 50 MG Oral Tablet 24 Hr [Pharmacy Med Name: Myrbetriq 50 mg tablet,extended release] 90 tablet 3     Sig: TAKE ONE TABLET BY MOUTH DAILY.    • LEVOTHYROXINE SODIUM 75 MCG Oral Tab

## 2020-07-23 ENCOUNTER — TELEPHONE (OUTPATIENT)
Dept: FAMILY MEDICINE CLINIC | Facility: CLINIC | Age: 80
End: 2020-07-23

## 2020-07-23 NOTE — TELEPHONE ENCOUNTER
Spoke to patient in regards to scheduling MA super visit, patient declined due to her  having a recent kidney transplant.

## 2020-08-12 DIAGNOSIS — K21.9 GASTROESOPHAGEAL REFLUX DISEASE WITHOUT ESOPHAGITIS: ICD-10-CM

## 2020-08-12 NOTE — TELEPHONE ENCOUNTER
Spoke to pt who will soon run out of Pantoprazole Sodium 40 MG Oral Tab EC. Pt indicated she now takes two of this medication one in the morning and one in the evening and she's requesting a refill.

## 2020-08-13 RX ORDER — PANTOPRAZOLE SODIUM 40 MG/1
40 TABLET, DELAYED RELEASE ORAL
Qty: 180 TABLET | Refills: 1 | Status: SHIPPED | OUTPATIENT
Start: 2020-08-13 | End: 2021-05-21

## 2020-10-12 DIAGNOSIS — E78.5 HYPERLIPIDEMIA WITH TARGET LDL LESS THAN 70: Chronic | ICD-10-CM

## 2020-10-13 RX ORDER — SIMVASTATIN 10 MG
10 TABLET ORAL NIGHTLY
Qty: 90 TABLET | Refills: 0 | Status: SHIPPED | OUTPATIENT
Start: 2020-10-13 | End: 2021-01-13

## 2020-10-13 NOTE — TELEPHONE ENCOUNTER
Requested Prescriptions     Pending Prescriptions Disp Refills   • SIMVASTATIN 10 MG Oral Tab [Pharmacy Med Name: simvastatin 10 mg tablet] 90 tablet 0     Sig: TAKE 1 TABLET (10 MG TOTAL) BY MOUTH NIGHTLY.      LOV 1/20/2020         Appointment scheduled:

## 2020-12-06 DIAGNOSIS — I10 HYPERTENSION GOAL BP (BLOOD PRESSURE) < 130/80: Chronic | ICD-10-CM

## 2020-12-06 DIAGNOSIS — N32.81 OVERACTIVE BLADDER: ICD-10-CM

## 2020-12-08 NOTE — TELEPHONE ENCOUNTER
Requested Prescriptions     Pending Prescriptions Disp Refills   • HYDROCHLOROTHIAZIDE 12.5 MG Oral Tab [Pharmacy Med Name: hydrochlorothiazide 12.5 mg tablet] 90 tablet 3     Sig: TAKE ONE TABLET BY MOUTH DAILY   • MYRBETRIQ 50 MG Oral Tablet 24 Hr [Pharm

## 2020-12-09 RX ORDER — MIRABEGRON 50 MG/1
TABLET, FILM COATED, EXTENDED RELEASE ORAL
Qty: 90 TABLET | Refills: 3 | Status: SHIPPED | OUTPATIENT
Start: 2020-12-09 | End: 2021-12-06

## 2020-12-09 RX ORDER — HYDROCHLOROTHIAZIDE 12.5 MG/1
TABLET ORAL
Qty: 90 TABLET | Refills: 0 | Status: SHIPPED | OUTPATIENT
Start: 2020-12-09 | End: 2021-03-09

## 2020-12-09 RX ORDER — LOSARTAN POTASSIUM 100 MG/1
TABLET ORAL
Qty: 90 TABLET | Refills: 0 | Status: SHIPPED | OUTPATIENT
Start: 2020-12-09 | End: 2021-03-09

## 2020-12-09 NOTE — TELEPHONE ENCOUNTER
Pt scheduled a virtual call with Dr. Dimitrios Del Rio on Monday 12/14/20 at 12:15 pm but will not have enough medication to get to that appt.

## 2020-12-12 NOTE — ASSESSMENT & PLAN NOTE
Stable GFR   Lab Results   Component Value Date/Time    CREATSERUM 1.09 (H) 01/21/2020 08:30 AM    GFR 63 11/10/2017 08:20 AM    GFRNAA 49 (L) 01/21/2020 08:30 AM      Continue present management

## 2020-12-12 NOTE — ASSESSMENT & PLAN NOTE
Stable, Continue present management.     Thyroid  (most recent labs)   Lab Results   Component Value Date/Time    TSH 0.778 01/21/2020 08:30 AM    T4F 1.1 05/13/2019 07:43 AM         Endocrine Medications          LEVOTHYROXINE SODIUM 75 MCG Oral Tab

## 2020-12-14 ENCOUNTER — VIRTUAL PHONE E/M (OUTPATIENT)
Dept: FAMILY MEDICINE CLINIC | Facility: CLINIC | Age: 80
End: 2020-12-14
Payer: COMMERCIAL

## 2020-12-14 DIAGNOSIS — I10 ESSENTIAL HYPERTENSION: Chronic | ICD-10-CM

## 2020-12-14 DIAGNOSIS — N18.30 CKD STAGE 3 SECONDARY TO DIABETES (HCC): ICD-10-CM

## 2020-12-14 DIAGNOSIS — E11.22 CKD STAGE 3 SECONDARY TO DIABETES (HCC): ICD-10-CM

## 2020-12-14 DIAGNOSIS — E11.9 DIABETES MELLITUS TYPE 2, DIET-CONTROLLED (HCC): Primary | Chronic | ICD-10-CM

## 2020-12-14 DIAGNOSIS — E78.2 MIXED HYPERLIPIDEMIA: ICD-10-CM

## 2020-12-14 PROCEDURE — 99442 PHONE E/M BY PHYS 11-20 MIN: CPT | Performed by: FAMILY MEDICINE

## 2020-12-14 NOTE — PROGRESS NOTES
Virtual/Telephone Check-In    Ino Shine verbally consents to a Virtual/Telephone Check-In service on 12/14/20. Patient has been referred to the [facility] website at [email or web address] to review the yearly Consent to Treat document.   Patient und were no vitals taken for this visit. There is no height or weight on file to calculate BMI. Physical Exam   Speaking in full sentences, no increased work of breathing. A&O x 3.      Assessment   ASSESSMENT AND PLAN:     Problem List Items Addressed This V

## 2021-01-13 DIAGNOSIS — E78.5 HYPERLIPIDEMIA WITH TARGET LDL LESS THAN 70: Chronic | ICD-10-CM

## 2021-01-13 DIAGNOSIS — M1A.0720 CHRONIC IDIOPATHIC GOUT INVOLVING TOE OF LEFT FOOT WITHOUT TOPHUS: Chronic | ICD-10-CM

## 2021-01-13 DIAGNOSIS — E03.9 ACQUIRED HYPOTHYROIDISM: Chronic | ICD-10-CM

## 2021-01-13 RX ORDER — SIMVASTATIN 10 MG
TABLET ORAL
Qty: 90 TABLET | Refills: 0 | Status: SHIPPED | OUTPATIENT
Start: 2021-01-13 | End: 2021-04-14

## 2021-01-13 RX ORDER — ALLOPURINOL 300 MG/1
TABLET ORAL
Qty: 90 TABLET | Refills: 3 | Status: SHIPPED | OUTPATIENT
Start: 2021-01-13 | End: 2022-01-13

## 2021-01-13 RX ORDER — LEVOTHYROXINE SODIUM 0.07 MG/1
TABLET ORAL
Qty: 90 TABLET | Refills: 1 | Status: SHIPPED | OUTPATIENT
Start: 2021-01-13 | End: 2021-04-14

## 2021-01-13 NOTE — TELEPHONE ENCOUNTER
Requested Prescriptions     Pending Prescriptions Disp Refills   • SIMVASTATIN 10 MG Oral Tab [Pharmacy Med Name: simvastatin 10 mg tablet] 90 tablet 0     Sig: TAKE ONE TABLET BY MOUTH NIGHTLY   • ALLOPURINOL 300 MG Oral Tab [Pharmacy Med Name: Tenzin Rothman

## 2021-02-01 DIAGNOSIS — Z23 NEED FOR VACCINATION: ICD-10-CM

## 2021-03-07 DIAGNOSIS — I10 HYPERTENSION GOAL BP (BLOOD PRESSURE) < 130/80: Chronic | ICD-10-CM

## 2021-03-07 DIAGNOSIS — Z51.81 ENCOUNTER FOR THERAPEUTIC DRUG MONITORING: ICD-10-CM

## 2021-03-07 DIAGNOSIS — F32.5 MAJOR DEPRESSION IN COMPLETE REMISSION (HCC): Chronic | ICD-10-CM

## 2021-03-09 RX ORDER — HYDROCHLOROTHIAZIDE 12.5 MG/1
TABLET ORAL
Qty: 90 TABLET | Refills: 0 | Status: SHIPPED | OUTPATIENT
Start: 2021-03-09 | End: 2021-04-14

## 2021-03-09 RX ORDER — LOSARTAN POTASSIUM 100 MG/1
TABLET ORAL
Qty: 90 TABLET | Refills: 0 | Status: SHIPPED | OUTPATIENT
Start: 2021-03-09 | End: 2021-04-14

## 2021-03-09 RX ORDER — TRAZODONE HYDROCHLORIDE 50 MG/1
50 TABLET ORAL NIGHTLY
Qty: 90 TABLET | Refills: 0 | Status: SHIPPED | OUTPATIENT
Start: 2021-03-09 | End: 2021-04-14

## 2021-03-09 RX ORDER — ESCITALOPRAM OXALATE 20 MG/1
20 TABLET ORAL DAILY
Qty: 90 TABLET | Refills: 0 | Status: SHIPPED | OUTPATIENT
Start: 2021-03-09 | End: 2021-04-14

## 2021-03-09 NOTE — TELEPHONE ENCOUNTER
escitalopram 20 mg tablet     Sig: TAKE 1 TABLET (20 MG TOTAL) BY MOUTH DAILY.     Disp:  90 tablet    Refills:  4    Start: 3/7/2021    Class: Normal    Non-formulary For: Major depression in complete remission ([facility]    Last ordered: 1 year ago by Ovidio Escobar

## 2021-03-31 ENCOUNTER — TELEPHONE (OUTPATIENT)
Dept: FAMILY MEDICINE CLINIC | Facility: CLINIC | Age: 81
End: 2021-03-31

## 2021-03-31 DIAGNOSIS — Z00.00 LABORATORY EXAMINATION ORDERED AS PART OF A ROUTINE GENERAL MEDICAL EXAMINATION: ICD-10-CM

## 2021-03-31 DIAGNOSIS — N18.30 CKD STAGE 3 SECONDARY TO DIABETES (HCC): ICD-10-CM

## 2021-03-31 DIAGNOSIS — E03.9 ACQUIRED HYPOTHYROIDISM: Primary | Chronic | ICD-10-CM

## 2021-03-31 DIAGNOSIS — E11.9 DIABETES MELLITUS TYPE 2, DIET-CONTROLLED (HCC): Chronic | ICD-10-CM

## 2021-03-31 DIAGNOSIS — R73.9 HYPERGLYCEMIA: ICD-10-CM

## 2021-03-31 DIAGNOSIS — D69.6 THROMBOCYTOPENIA (HCC): ICD-10-CM

## 2021-03-31 DIAGNOSIS — M1A.9XX0 CHRONIC GOUT, UNSPECIFIED CAUSE, UNSPECIFIED SITE: ICD-10-CM

## 2021-03-31 DIAGNOSIS — M1A.0720 CHRONIC IDIOPATHIC GOUT INVOLVING TOE OF LEFT FOOT WITHOUT TOPHUS: Chronic | ICD-10-CM

## 2021-03-31 DIAGNOSIS — E11.22 CKD STAGE 3 SECONDARY TO DIABETES (HCC): ICD-10-CM

## 2021-03-31 NOTE — TELEPHONE ENCOUNTER
Please enter lab orders for the patient's upcoming physical appointment. Physical scheduled:    Your appointments     Date & Time Appointment Department [facility])    Apr 14, 2021 11:15 AM CDT MA Supervisit with Karla Soria  [facility]

## 2021-03-31 NOTE — TELEPHONE ENCOUNTER
Diagnoses and all orders for this visit:    Acquired hypothyroidism  -     TSH W REFLEX TO FREE T4; Future    CKD stage 3 secondary to diabetes ([facility]  -     CBC, PLATELET; NO DIFFERENTIAL;  Future    Diabetes mellitus type 2, diet-controlled ([facility]  -     CO

## 2021-04-09 ENCOUNTER — LAB ENCOUNTER (OUTPATIENT)
Dept: LAB | Age: 81
End: 2021-04-09
Attending: FAMILY MEDICINE
Payer: MEDICARE

## 2021-04-09 DIAGNOSIS — M1A.0720 CHRONIC IDIOPATHIC GOUT INVOLVING TOE OF LEFT FOOT WITHOUT TOPHUS: Chronic | ICD-10-CM

## 2021-04-09 DIAGNOSIS — M1A.9XX0 CHRONIC GOUT, UNSPECIFIED CAUSE, UNSPECIFIED SITE: ICD-10-CM

## 2021-04-09 DIAGNOSIS — E11.9 DIABETES MELLITUS TYPE 2, DIET-CONTROLLED (HCC): Chronic | ICD-10-CM

## 2021-04-09 DIAGNOSIS — Z00.00 LABORATORY EXAMINATION ORDERED AS PART OF A ROUTINE GENERAL MEDICAL EXAMINATION: ICD-10-CM

## 2021-04-09 DIAGNOSIS — E03.9 ACQUIRED HYPOTHYROIDISM: Chronic | ICD-10-CM

## 2021-04-09 DIAGNOSIS — E11.22 CKD STAGE 3 SECONDARY TO DIABETES (HCC): ICD-10-CM

## 2021-04-09 DIAGNOSIS — N18.30 CKD STAGE 3 SECONDARY TO DIABETES (HCC): ICD-10-CM

## 2021-04-09 DIAGNOSIS — D69.6 THROMBOCYTOPENIA (HCC): ICD-10-CM

## 2021-04-09 PROCEDURE — 80053 COMPREHEN METABOLIC PANEL: CPT

## 2021-04-09 PROCEDURE — 36415 COLL VENOUS BLD VENIPUNCTURE: CPT

## 2021-04-09 PROCEDURE — 83036 HEMOGLOBIN GLYCOSYLATED A1C: CPT

## 2021-04-09 PROCEDURE — 80061 LIPID PANEL: CPT

## 2021-04-09 PROCEDURE — 85027 COMPLETE CBC AUTOMATED: CPT

## 2021-04-09 PROCEDURE — 84550 ASSAY OF BLOOD/URIC ACID: CPT

## 2021-04-09 PROCEDURE — 84443 ASSAY THYROID STIM HORMONE: CPT

## 2021-04-09 PROCEDURE — 82043 UR ALBUMIN QUANTITATIVE: CPT

## 2021-04-09 PROCEDURE — 82570 ASSAY OF URINE CREATININE: CPT

## 2021-04-13 NOTE — ASSESSMENT & PLAN NOTE
Stable continue present management   Lab Results   Component Value Date    URIC 2.8 04/09/2021    URIC 3.4 01/21/2020    WBC 5.1 04/09/2021    CREATSERUM 0.99 04/09/2021    GFR 63 11/10/2017

## 2021-04-13 NOTE — ASSESSMENT & PLAN NOTE
Last Platelet value was 348 done 4/9/2021. Lowest level in the last 10 years was 126.  stable continue present management

## 2021-04-13 NOTE — ASSESSMENT & PLAN NOTE
Stable, Continue present management.     Thyroid  (most recent labs)   Lab Results   Component Value Date/Time    TSH 0.365 04/09/2021 08:29 AM    T4F 1.1 05/13/2019 07:43 AM         Endocrine Medications          LEVOTHYROXINE SODIUM 75 MCG Oral Tab

## 2021-04-14 ENCOUNTER — OFFICE VISIT (OUTPATIENT)
Dept: FAMILY MEDICINE CLINIC | Facility: CLINIC | Age: 81
End: 2021-04-14
Payer: COMMERCIAL

## 2021-04-14 VITALS
TEMPERATURE: 98 F | DIASTOLIC BLOOD PRESSURE: 66 MMHG | BODY MASS INDEX: 31.35 KG/M2 | WEIGHT: 170.38 LBS | SYSTOLIC BLOOD PRESSURE: 122 MMHG | HEART RATE: 81 BPM | RESPIRATION RATE: 17 BRPM | HEIGHT: 62 IN

## 2021-04-14 DIAGNOSIS — N18.30 CKD STAGE 3 SECONDARY TO DIABETES (HCC): ICD-10-CM

## 2021-04-14 DIAGNOSIS — C50.411 MALIGNANT NEOPLASM OF UPPER-OUTER QUADRANT OF RIGHT BREAST IN FEMALE, ESTROGEN RECEPTOR POSITIVE (HCC): ICD-10-CM

## 2021-04-14 DIAGNOSIS — E11.22 CKD STAGE 3 SECONDARY TO DIABETES (HCC): ICD-10-CM

## 2021-04-14 DIAGNOSIS — I10 ESSENTIAL HYPERTENSION: Chronic | ICD-10-CM

## 2021-04-14 DIAGNOSIS — N32.81 OVERACTIVE BLADDER: ICD-10-CM

## 2021-04-14 DIAGNOSIS — E03.9 ACQUIRED HYPOTHYROIDISM: Chronic | ICD-10-CM

## 2021-04-14 DIAGNOSIS — I77.9 BILATERAL CAROTID ARTERY DISEASE, UNSPECIFIED TYPE (HCC): ICD-10-CM

## 2021-04-14 DIAGNOSIS — K76.0 FATTY INFILTRATION OF LIVER: ICD-10-CM

## 2021-04-14 DIAGNOSIS — M1A.0720 CHRONIC IDIOPATHIC GOUT INVOLVING TOE OF LEFT FOOT WITHOUT TOPHUS: Chronic | ICD-10-CM

## 2021-04-14 DIAGNOSIS — N99.3 PELVIC RELAXATION DUE TO VAGINAL VAULT PROLAPSE, POSTHYSTERECTOMY: ICD-10-CM

## 2021-04-14 DIAGNOSIS — F32.5 MAJOR DEPRESSION IN COMPLETE REMISSION (HCC): Chronic | ICD-10-CM

## 2021-04-14 DIAGNOSIS — Z17.0 MALIGNANT NEOPLASM OF UPPER-OUTER QUADRANT OF RIGHT BREAST IN FEMALE, ESTROGEN RECEPTOR POSITIVE (HCC): ICD-10-CM

## 2021-04-14 DIAGNOSIS — D69.6 THROMBOCYTOPENIA (HCC): ICD-10-CM

## 2021-04-14 DIAGNOSIS — E78.2 MIXED HYPERLIPIDEMIA: ICD-10-CM

## 2021-04-14 DIAGNOSIS — Z00.00 ENCOUNTER FOR ANNUAL HEALTH EXAMINATION: ICD-10-CM

## 2021-04-14 DIAGNOSIS — K21.9 GASTROESOPHAGEAL REFLUX DISEASE WITHOUT ESOPHAGITIS: ICD-10-CM

## 2021-04-14 DIAGNOSIS — E11.9 DIABETES MELLITUS TYPE 2, DIET-CONTROLLED (HCC): Chronic | ICD-10-CM

## 2021-04-14 DIAGNOSIS — Z00.00 ANNUAL PHYSICAL EXAM: Primary | ICD-10-CM

## 2021-04-14 PROCEDURE — 99397 PER PM REEVAL EST PAT 65+ YR: CPT | Performed by: FAMILY MEDICINE

## 2021-04-14 PROCEDURE — 96160 PT-FOCUSED HLTH RISK ASSMT: CPT | Performed by: FAMILY MEDICINE

## 2021-04-14 PROCEDURE — 3074F SYST BP LT 130 MM HG: CPT | Performed by: FAMILY MEDICINE

## 2021-04-14 PROCEDURE — 3078F DIAST BP <80 MM HG: CPT | Performed by: FAMILY MEDICINE

## 2021-04-14 PROCEDURE — G0439 PPPS, SUBSEQ VISIT: HCPCS | Performed by: FAMILY MEDICINE

## 2021-04-14 PROCEDURE — 3008F BODY MASS INDEX DOCD: CPT | Performed by: FAMILY MEDICINE

## 2021-04-14 RX ORDER — HYDROCHLOROTHIAZIDE 12.5 MG/1
12.5 TABLET ORAL DAILY
Qty: 90 TABLET | Refills: 3 | Status: SHIPPED | OUTPATIENT
Start: 2021-04-14

## 2021-04-14 RX ORDER — SIMVASTATIN 10 MG
10 TABLET ORAL NIGHTLY
Qty: 90 TABLET | Refills: 3 | Status: SHIPPED | OUTPATIENT
Start: 2021-04-14

## 2021-04-14 RX ORDER — LEVOTHYROXINE SODIUM 0.07 MG/1
75 TABLET ORAL
Qty: 90 TABLET | Refills: 3 | Status: SHIPPED | OUTPATIENT
Start: 2021-04-14

## 2021-04-14 RX ORDER — LOSARTAN POTASSIUM 100 MG/1
100 TABLET ORAL DAILY
Qty: 90 TABLET | Refills: 3 | Status: SHIPPED | OUTPATIENT
Start: 2021-04-14

## 2021-04-14 RX ORDER — ESCITALOPRAM OXALATE 20 MG/1
20 TABLET ORAL DAILY
Qty: 90 TABLET | Refills: 3 | Status: SHIPPED | OUTPATIENT
Start: 2021-04-14 | End: 2021-07-13

## 2021-04-14 RX ORDER — TRAZODONE HYDROCHLORIDE 50 MG/1
50 TABLET ORAL NIGHTLY
Qty: 90 TABLET | Refills: 3 | Status: SHIPPED | OUTPATIENT
Start: 2021-04-14

## 2021-04-14 NOTE — PATIENT INSTRUCTIONS
Ino Cuevas's SCREENING SCHEDULE   Tests on this list are recommended by your physician but may not be covered, or covered at this frequency, by your insurer. Please check with your insurance carrier before scheduling to verify coverage.    PREVEN who meet one of the following criteria:   • Men who are 73-68 years old and have smoked more than 100 cigarettes in their lifetime   • Anyone with a family history    Colorectal Cancer Screening  Covered up to Age 76     Colonoscopy Screen   Covered every care reminders to display for this patient. Please get this Mammogram regularly   Immunizations      Influenza  Covered Annually No orders found for this or any previous visit.  Please get every year    Pneumococcal 13 (Prevnar)  Covered Once after 72 Order [facility] regarding Advance Directives.

## 2021-04-14 NOTE — PROGRESS NOTES
HPI:   Sherie Martínez is a 68year old female who presents for a MA (Medicare Advantage) Supervisit (Once per calendar year).     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 Nitrofurantoin Monohyd Macro 100 MG Oral Cap, Take 1 capsule (100 mg total) by mouth 2 (two) times daily. Nystatin (NYSTOP) 176402 UNIT/GM External Powder, Apply 1 Application topically 2 (two) times daily as needed (rash in skin folds).   C systems reviewed and are negative.         EXAM:   /66   Pulse 81   Temp 97.8 °F (36.6 °C) (Temporal)   Resp 17   Ht 5' 2\" (1.575 m)   Wt 170 lb 6.4 oz (77.3 kg)   BMI 31.17 kg/m²  Estimated body mass index is 31.17 kg/m² as calculated from the follo normal. There is no distension. Palpations: Abdomen is soft. Musculoskeletal:         General: Normal range of motion. Cervical back: Normal range of motion and neck supple. Right lower leg: No edema. Left lower leg: No edema.    Skin: mouth daily. Dispense: 90 tablet; Refill: 3  -     Losartan Potassium; Take 1 tablet (100 mg total) by mouth daily. Dispense: 90 tablet; Refill: 3  3.  Mixed hyperlipidemia  Overview:  Simvastatin 10  Assessment & Plan:  Stable, Continue present managemen Escitalopram Oxalate; Take 1 tablet (20 mg total) by mouth daily. Dispense: 90 tablet; Refill: 3  8.  Gastroesophageal reflux disease without esophagitis  Overview:  Pantoprazole 40 prn, failed H2 blocker but added to PPI 5/16/2019   Assessment & Plan:  [facility] examination  Other orders  -     traZODone HCl; Take 1 tablet (50 mg total) by mouth nightly. Dispense: 90 tablet; Refill: 3  Overall doing quite well, reassess in 6 months  I have changed Ino Cuevas's Levothyroxine Sodium, simvastatin, hydrochlorot if applicable    Flex Sigmoidoscopy Screen every 10 years No results found for this or any previous visit. No flowsheet data found. Fecal Occult Blood Annually No results found for: FOB No flowsheet data found.     Glaucoma Screening      Ophthalmology Medicare Part B No vaccine history found This may be covered with your pharmacy  prescription benefits      SPECIFIC DISEASE MONITORING Internal Lab or Procedure External Lab or Procedure      Annual Monitoring of Persistent     Medications (ACE/ARB, digox

## 2021-08-09 NOTE — ASSESSMENT & PLAN NOTE
As for her Diabetes, it is well controlled, no significant medication side effects noted. Recommendations are: continue present meds, lose weight by increased dietary compliance and exercise and will check labs as ordered.     Lab Results   Component Va
Patricia Spar
Platelet Count[de-identified]    Lab Results   Component Value Date    .0 05/13/2019    .0 (L) 11/08/2018    .0 (L) 05/14/2018     Improving numbers, continue to follow
Stable continue to monitor.  Due for US, stable LFT
Stable in meds.  Continue present management
Stable in meds.  Continue present management
Stable in meds.  Continue present management   Lab Results   Component Value Date    URIC 3.7 05/13/2019    URIC 3.2 11/10/2017    WBC 5.8 05/13/2019    CREATSERUM 1.09 (H) 05/13/2019    GFR 63 11/10/2017
Stable per oncology
Stable, Continue present management.     Blood Pressure and Cardiac Medications          Losartan Potassium-HCTZ 100-12.5 MG Oral Tab
Stable, Continue present management.     Cholesterol Lowering Medications          SIMVASTATIN 10 MG Oral Tab
Stable, Continue present management.     Cholesterol Lowering Medications          SIMVASTATIN 10 MG Oral Tab
Stable, Continue present management.     Thyroid  (most recent labs)   Lab Results   Component Value Date/Time    TSH 0.906 05/13/2019 07:43 AM    T4F 1.1 05/13/2019 07:43 AM         Endocrine Medications          Levothyroxine Sodium 75 MCG Oral Tab
Worse sx, add zantac to protonic and see how she does
Patient/Caregiver provided printed discharge information.

## 2021-09-09 NOTE — TELEPHONE ENCOUNTER
A closure device system was utilized/deployed for pre-closure of the right radial artery access site using a Device Tr Band Reg W/inflator.  Called patient to schedule her MA Supervisit due to her cancelling thru [employer] and she states her  just had a kidney transplant that she did not want to come in due to the pandemic. She is really needing her medications so please call to advise.

## 2021-10-04 ENCOUNTER — LAB ENCOUNTER (OUTPATIENT)
Dept: LAB | Age: 81
End: 2021-10-04
Attending: FAMILY MEDICINE
Payer: MEDICARE

## 2021-10-04 DIAGNOSIS — E11.9 DIABETES MELLITUS TYPE 2, DIET-CONTROLLED (HCC): Chronic | ICD-10-CM

## 2021-10-04 DIAGNOSIS — E03.9 ACQUIRED HYPOTHYROIDISM: Chronic | ICD-10-CM

## 2021-10-04 DIAGNOSIS — M1A.0720 CHRONIC IDIOPATHIC GOUT INVOLVING TOE OF LEFT FOOT WITHOUT TOPHUS: ICD-10-CM

## 2021-10-04 DIAGNOSIS — E78.2 MIXED HYPERLIPIDEMIA: ICD-10-CM

## 2021-10-04 PROCEDURE — 80053 COMPREHEN METABOLIC PANEL: CPT

## 2021-10-04 PROCEDURE — 84439 ASSAY OF FREE THYROXINE: CPT

## 2021-10-04 PROCEDURE — 80061 LIPID PANEL: CPT

## 2021-10-04 PROCEDURE — 83036 HEMOGLOBIN GLYCOSYLATED A1C: CPT

## 2021-10-04 PROCEDURE — 84443 ASSAY THYROID STIM HORMONE: CPT

## 2021-10-04 PROCEDURE — 85025 COMPLETE CBC W/AUTO DIFF WBC: CPT

## 2021-10-04 PROCEDURE — 36415 COLL VENOUS BLD VENIPUNCTURE: CPT

## 2021-10-04 PROCEDURE — 84550 ASSAY OF BLOOD/URIC ACID: CPT

## 2021-10-11 ENCOUNTER — TELEPHONE (OUTPATIENT)
Dept: FAMILY MEDICINE CLINIC | Facility: CLINIC | Age: 81
End: 2021-10-11

## 2021-10-11 RX ORDER — HYDROCODONE BITARTRATE AND ACETAMINOPHEN 5; 325 MG/1; MG/1
TABLET ORAL
COMMUNITY
Start: 2021-10-07 | End: 2021-10-15 | Stop reason: ALTCHOICE

## 2021-10-11 RX ORDER — AMOXICILLIN 500 MG/1
CAPSULE ORAL 3 TIMES DAILY
COMMUNITY
Start: 2021-10-07 | End: 2021-10-15 | Stop reason: ALTCHOICE

## 2021-10-11 NOTE — TELEPHONE ENCOUNTER
Pt is calling she had oral surgery on last Thursday patient is still in pain:    2 extra strength Tylenol and 2 Advil at the same time. Dr. Behzad Fam suggested this dosage and told her to check with Dr. Corinne Camilo.

## 2021-10-11 NOTE — TELEPHONE ENCOUNTER
Called and talked to patient explained that it was safe to take tylenol with advil for the pain and at night could take whole norco she is goint to do this and apply heat to the area and if not better will call back to be seen by Dr Valery Nieves

## 2021-10-14 NOTE — ASSESSMENT & PLAN NOTE
Last Platelet value was 759 done 10/4/2021. Lowest level in the last 10 years was 126.  stable, continue present management

## 2021-10-14 NOTE — ASSESSMENT & PLAN NOTE
Stable, Continue present management.     Thyroid  (most recent labs)   Lab Results   Component Value Date/Time    TSH 0.688 10/04/2021 08:11 AM    T4F 1.1 10/04/2021 08:11 AM         Endocrine Medications          Levothyroxine Sodium 75 MCG Oral Tab

## 2021-10-14 NOTE — ASSESSMENT & PLAN NOTE
Lab Results   Component Value Date/Time    CREATSERUM 1.00 10/04/2021 08:11 AM    GFR 63 11/10/2017 08:20 AM    GFRNAA 54 (L) 10/04/2021 08:11 AM

## 2021-10-15 ENCOUNTER — OFFICE VISIT (OUTPATIENT)
Dept: FAMILY MEDICINE CLINIC | Facility: CLINIC | Age: 81
End: 2021-10-15
Payer: COMMERCIAL

## 2021-10-15 VITALS
HEART RATE: 96 BPM | TEMPERATURE: 98 F | HEIGHT: 61.5 IN | RESPIRATION RATE: 20 BRPM | WEIGHT: 176 LBS | BODY MASS INDEX: 32.8 KG/M2 | DIASTOLIC BLOOD PRESSURE: 58 MMHG | SYSTOLIC BLOOD PRESSURE: 110 MMHG

## 2021-10-15 DIAGNOSIS — I10 ESSENTIAL HYPERTENSION: Chronic | ICD-10-CM

## 2021-10-15 DIAGNOSIS — E78.2 MIXED HYPERLIPIDEMIA: ICD-10-CM

## 2021-10-15 DIAGNOSIS — F32.5 MAJOR DEPRESSION IN COMPLETE REMISSION (HCC): Chronic | ICD-10-CM

## 2021-10-15 DIAGNOSIS — E11.9 DIABETES MELLITUS TYPE 2, DIET-CONTROLLED (HCC): Primary | Chronic | ICD-10-CM

## 2021-10-15 DIAGNOSIS — E03.9 ACQUIRED HYPOTHYROIDISM: Chronic | ICD-10-CM

## 2021-10-15 DIAGNOSIS — N18.30 CKD STAGE 3 SECONDARY TO DIABETES (HCC): ICD-10-CM

## 2021-10-15 DIAGNOSIS — D69.6 THROMBOCYTOPENIA (HCC): ICD-10-CM

## 2021-10-15 DIAGNOSIS — E11.22 CKD STAGE 3 SECONDARY TO DIABETES (HCC): ICD-10-CM

## 2021-10-15 PROCEDURE — 3008F BODY MASS INDEX DOCD: CPT | Performed by: FAMILY MEDICINE

## 2021-10-15 PROCEDURE — 3074F SYST BP LT 130 MM HG: CPT | Performed by: FAMILY MEDICINE

## 2021-10-15 PROCEDURE — 90662 IIV NO PRSV INCREASED AG IM: CPT | Performed by: FAMILY MEDICINE

## 2021-10-15 PROCEDURE — 99214 OFFICE O/P EST MOD 30 MIN: CPT | Performed by: FAMILY MEDICINE

## 2021-10-15 PROCEDURE — 3078F DIAST BP <80 MM HG: CPT | Performed by: FAMILY MEDICINE

## 2021-10-15 PROCEDURE — G0008 ADMIN INFLUENZA VIRUS VAC: HCPCS | Performed by: FAMILY MEDICINE

## 2021-10-15 NOTE — PROGRESS NOTES
Subjective:     Patient presents with:  Blood Pressure     Subjective   Ino Beverly is a 68year old female who presents for recheck of her hypertension. She has been taking medications as instructed, with no medication side effects.  Her home BP mo side and 2+ on the left side. Heart sounds: Normal heart sounds. No murmur heard. Pulmonary:      Effort: Pulmonary effort is normal. No respiratory distress. Breath sounds: Normal breath sounds. No wheezing.    Abdominal:      General: Bowel 100-12.5  Assessment & Plan:  Lab Results   Component Value Date/Time    HIRAERUM 1.00 10/04/2021 08:11 AM    GFR 63 11/10/2017 08:20 AM    GFRNAA 54 (L) 10/04/2021 08:11 AM        Orders:  -     CBC With Differential With Platelet;  Future; Expected date Return in about 6 months (around 4/15/2022) for [facility] ([address]) visit ([facility] [place]).     Rohith Willson MD, 10/15/2021  12:24 PM

## 2021-12-02 DIAGNOSIS — N32.81 OVERACTIVE BLADDER: ICD-10-CM

## 2021-12-06 RX ORDER — MIRABEGRON 50 MG/1
TABLET, FILM COATED, EXTENDED RELEASE ORAL
Qty: 90 TABLET | Refills: 3 | Status: SHIPPED | OUTPATIENT
Start: 2021-12-06

## 2021-12-06 NOTE — TELEPHONE ENCOUNTER
Refill request for:    Requested Prescriptions     Pending Prescriptions Disp Refills   • MYRBETRIQ 50 MG Oral Tablet 24 Hr [Pharmacy Med Name: Myrbetriq 50 mg tablet,extended release] 90 tablet 3     Sig: TAKE ONE TABLET BY MOUTH DAILY        Last Prescri

## 2022-01-12 DIAGNOSIS — M1A.0720 CHRONIC IDIOPATHIC GOUT INVOLVING TOE OF LEFT FOOT WITHOUT TOPHUS: Chronic | ICD-10-CM

## 2022-01-13 RX ORDER — ALLOPURINOL 300 MG/1
TABLET ORAL
Qty: 90 TABLET | Refills: 3 | Status: SHIPPED | OUTPATIENT
Start: 2022-01-13

## 2022-01-13 NOTE — TELEPHONE ENCOUNTER
Refill request for:    Requested Prescriptions     Pending Prescriptions Disp Refills   • ALLOPURINOL 300 MG Oral Tab [Pharmacy Med Name: allopurinol 300 mg tablet] 90 tablet 3     Sig: TAKE ONE TABLET BY MOUTH EVERY DAY        Last Prescribed Quantity Ref

## 2022-03-30 ENCOUNTER — TELEPHONE (OUTPATIENT)
Dept: FAMILY MEDICINE CLINIC | Facility: CLINIC | Age: 82
End: 2022-03-30

## 2022-03-30 NOTE — TELEPHONE ENCOUNTER
Spoke to patient's spouse and spouse stated pt will complete [employer] forms via [employer] once she gets back from [employer] later today.

## 2022-03-31 ENCOUNTER — LAB ENCOUNTER (OUTPATIENT)
Dept: LAB | Age: 82
End: 2022-03-31
Attending: FAMILY MEDICINE
Payer: MEDICARE

## 2022-03-31 DIAGNOSIS — E78.2 MIXED HYPERLIPIDEMIA: ICD-10-CM

## 2022-03-31 DIAGNOSIS — E11.22 CKD STAGE 3 SECONDARY TO DIABETES (HCC): ICD-10-CM

## 2022-03-31 DIAGNOSIS — E03.9 ACQUIRED HYPOTHYROIDISM: Chronic | ICD-10-CM

## 2022-03-31 DIAGNOSIS — N18.30 CKD STAGE 3 SECONDARY TO DIABETES (HCC): ICD-10-CM

## 2022-03-31 DIAGNOSIS — E11.9 DIABETES MELLITUS TYPE 2, DIET-CONTROLLED (HCC): Chronic | ICD-10-CM

## 2022-03-31 LAB
ALBUMIN SERPL-MCNC: 3.8 G/DL (ref 3.4–5)
ALBUMIN/GLOB SERPL: 1.6 {RATIO} (ref 1–2)
ALP LIVER SERPL-CCNC: 87 U/L
ALT SERPL-CCNC: 24 U/L
ANION GAP SERPL CALC-SCNC: 4 MMOL/L (ref 0–18)
BASOPHILS # BLD AUTO: 0.02 X10(3) UL (ref 0–0.2)
BASOPHILS NFR BLD AUTO: 0.4 %
BILIRUB SERPL-MCNC: 0.7 MG/DL (ref 0.1–2)
BUN BLD-MCNC: 20 MG/DL (ref 7–18)
CALCIUM BLD-MCNC: 8.9 MG/DL (ref 8.5–10.1)
CHLORIDE SERPL-SCNC: 107 MMOL/L (ref 98–112)
CHOLEST SERPL-MCNC: 130 MG/DL (ref ?–200)
CO2 SERPL-SCNC: 31 MMOL/L (ref 21–32)
CREAT BLD-MCNC: 1.01 MG/DL
CREAT UR-SCNC: 258 MG/DL
EOSINOPHIL # BLD AUTO: 0.16 X10(3) UL (ref 0–0.7)
EOSINOPHIL NFR BLD AUTO: 3.2 %
ERYTHROCYTE [DISTWIDTH] IN BLOOD BY AUTOMATED COUNT: 13.7 %
EST. AVERAGE GLUCOSE BLD GHB EST-MCNC: 120 MG/DL (ref 68–126)
FASTING PATIENT LIPID ANSWER: YES
FASTING STATUS PATIENT QL REPORTED: YES
GLOBULIN PLAS-MCNC: 2.4 G/DL (ref 2.8–4.4)
GLUCOSE BLD-MCNC: 90 MG/DL (ref 70–99)
HBA1C MFR BLD: 5.8 % (ref ?–5.7)
HCT VFR BLD AUTO: 39.7 %
HDLC SERPL-MCNC: 64 MG/DL (ref 40–59)
HGB BLD-MCNC: 12.8 G/DL
IMM GRANULOCYTES # BLD AUTO: 0.02 X10(3) UL (ref 0–1)
IMM GRANULOCYTES NFR BLD: 0.4 %
LDLC SERPL CALC-MCNC: 52 MG/DL (ref ?–100)
LYMPHOCYTES # BLD AUTO: 0.98 X10(3) UL (ref 1–4)
LYMPHOCYTES NFR BLD AUTO: 19.4 %
MCH RBC QN AUTO: 31.5 PG (ref 26–34)
MCHC RBC AUTO-ENTMCNC: 32.2 G/DL (ref 31–37)
MCV RBC AUTO: 97.8 FL
MICROALBUMIN UR-MCNC: 1.21 MG/DL
MICROALBUMIN/CREAT 24H UR-RTO: 4.7 UG/MG (ref ?–30)
MONOCYTES # BLD AUTO: 0.41 X10(3) UL (ref 0.1–1)
MONOCYTES NFR BLD AUTO: 8.1 %
NEUTROPHILS # BLD AUTO: 3.46 X10 (3) UL (ref 1.5–7.7)
NEUTROPHILS # BLD AUTO: 3.46 X10(3) UL (ref 1.5–7.7)
NEUTROPHILS NFR BLD AUTO: 68.5 %
NONHDLC SERPL-MCNC: 66 MG/DL (ref ?–130)
OSMOLALITY SERPL CALC.SUM OF ELEC: 296 MOSM/KG (ref 275–295)
PLATELET # BLD AUTO: 125 10(3)UL (ref 150–450)
POTASSIUM SERPL-SCNC: 3.6 MMOL/L (ref 3.5–5.1)
PROT SERPL-MCNC: 6.2 G/DL (ref 6.4–8.2)
RBC # BLD AUTO: 4.06 X10(6)UL
SODIUM SERPL-SCNC: 142 MMOL/L (ref 136–145)
T4 FREE SERPL-MCNC: 1.2 NG/DL (ref 0.8–1.7)
TRIGL SERPL-MCNC: 70 MG/DL (ref 30–149)
TSI SER-ACNC: 0.27 MIU/ML (ref 0.36–3.74)
VLDLC SERPL CALC-MCNC: 10 MG/DL (ref 0–30)
WBC # BLD AUTO: 5.1 X10(3) UL (ref 4–11)

## 2022-03-31 PROCEDURE — 83036 HEMOGLOBIN GLYCOSYLATED A1C: CPT

## 2022-03-31 PROCEDURE — 80061 LIPID PANEL: CPT

## 2022-03-31 PROCEDURE — 84443 ASSAY THYROID STIM HORMONE: CPT

## 2022-03-31 PROCEDURE — 82043 UR ALBUMIN QUANTITATIVE: CPT

## 2022-03-31 PROCEDURE — 85025 COMPLETE CBC W/AUTO DIFF WBC: CPT

## 2022-03-31 PROCEDURE — 82570 ASSAY OF URINE CREATININE: CPT

## 2022-03-31 PROCEDURE — 80053 COMPREHEN METABOLIC PANEL: CPT

## 2022-03-31 PROCEDURE — 36415 COLL VENOUS BLD VENIPUNCTURE: CPT

## 2022-03-31 PROCEDURE — 84439 ASSAY OF FREE THYROXINE: CPT

## 2022-04-01 NOTE — ASSESSMENT & PLAN NOTE
Stable, continue present management   Lab Results   Component Value Date    URIC 3.0 10/04/2021    URIC 2.8 04/09/2021    WBC 5.1 03/31/2022    CREATSERUM 1.01 03/31/2022    GFR 63 11/10/2017

## 2022-04-01 NOTE — ASSESSMENT & PLAN NOTE
As for her Diabetes, it is well controlled, no significant medication side effects noted. Recommendations are: continue present meds, lose weight by increased dietary compliance and exercise and will check labs as ordered.     Lab Results   Component Value Date    A1C 5.8 (H) 03/31/2022    A1C 5.9 (H) 10/04/2021

## 2022-04-01 NOTE — ASSESSMENT & PLAN NOTE
Stable, continue present management .   Lab Results   Component Value Date/Time    CREATSERUM 1.01 03/31/2022 08:35 AM    GFR 63 11/10/2017 08:20 AM    GFRNAA 53 (L) 03/31/2022 08:35 AM

## 2022-04-01 NOTE — ASSESSMENT & PLAN NOTE
Stable, Continue present management.     Thyroid  (most recent labs)   Lab Results   Component Value Date/Time    TSH 0.273 (L) 03/31/2022 08:35 AM    T4F 1.2 03/31/2022 08:35 AM         Endocrine Medications          Levothyroxine Sodium 75 MCG Oral Tab

## 2022-04-04 ENCOUNTER — OFFICE VISIT (OUTPATIENT)
Dept: FAMILY MEDICINE CLINIC | Facility: CLINIC | Age: 82
End: 2022-04-04
Payer: COMMERCIAL

## 2022-04-04 VITALS
HEIGHT: 61.5 IN | SYSTOLIC BLOOD PRESSURE: 128 MMHG | HEART RATE: 66 BPM | BODY MASS INDEX: 32.92 KG/M2 | RESPIRATION RATE: 18 BRPM | WEIGHT: 176.63 LBS | DIASTOLIC BLOOD PRESSURE: 76 MMHG

## 2022-04-04 DIAGNOSIS — M20.41 HAMMER TOES OF BOTH FEET: Chronic | ICD-10-CM

## 2022-04-04 DIAGNOSIS — K21.9 GASTROESOPHAGEAL REFLUX DISEASE WITHOUT ESOPHAGITIS: ICD-10-CM

## 2022-04-04 DIAGNOSIS — E11.22 CKD STAGE 3 SECONDARY TO DIABETES (HCC): ICD-10-CM

## 2022-04-04 DIAGNOSIS — Z17.0 MALIGNANT NEOPLASM OF UPPER-OUTER QUADRANT OF RIGHT BREAST IN FEMALE, ESTROGEN RECEPTOR POSITIVE (HCC): ICD-10-CM

## 2022-04-04 DIAGNOSIS — N99.3 PELVIC RELAXATION DUE TO VAGINAL VAULT PROLAPSE, POSTHYSTERECTOMY: ICD-10-CM

## 2022-04-04 DIAGNOSIS — M20.42 HAMMER TOES OF BOTH FEET: Chronic | ICD-10-CM

## 2022-04-04 DIAGNOSIS — N18.30 CKD STAGE 3 SECONDARY TO DIABETES (HCC): ICD-10-CM

## 2022-04-04 DIAGNOSIS — K76.0 FATTY INFILTRATION OF LIVER: ICD-10-CM

## 2022-04-04 DIAGNOSIS — F33.42 RECURRENT MAJOR DEPRESSIVE DISORDER, IN FULL REMISSION (HCC): Chronic | ICD-10-CM

## 2022-04-04 DIAGNOSIS — Z00.00 ANNUAL PHYSICAL EXAM: Primary | ICD-10-CM

## 2022-04-04 DIAGNOSIS — E78.2 MIXED HYPERLIPIDEMIA: ICD-10-CM

## 2022-04-04 DIAGNOSIS — I77.9 BILATERAL CAROTID ARTERY DISEASE, UNSPECIFIED TYPE (HCC): ICD-10-CM

## 2022-04-04 DIAGNOSIS — E11.22 TYPE 2 DIABETES MELLITUS WITH STAGE 3A CHRONIC KIDNEY DISEASE, WITHOUT LONG-TERM CURRENT USE OF INSULIN (HCC): ICD-10-CM

## 2022-04-04 DIAGNOSIS — E03.9 ACQUIRED HYPOTHYROIDISM: Chronic | ICD-10-CM

## 2022-04-04 DIAGNOSIS — N18.31 TYPE 2 DIABETES MELLITUS WITH STAGE 3A CHRONIC KIDNEY DISEASE, WITHOUT LONG-TERM CURRENT USE OF INSULIN (HCC): ICD-10-CM

## 2022-04-04 DIAGNOSIS — D69.6 THROMBOCYTOPENIA (HCC): ICD-10-CM

## 2022-04-04 DIAGNOSIS — C50.411 MALIGNANT NEOPLASM OF UPPER-OUTER QUADRANT OF RIGHT BREAST IN FEMALE, ESTROGEN RECEPTOR POSITIVE (HCC): ICD-10-CM

## 2022-04-04 DIAGNOSIS — N32.81 OVERACTIVE BLADDER: ICD-10-CM

## 2022-04-04 DIAGNOSIS — I10 ESSENTIAL HYPERTENSION: Chronic | ICD-10-CM

## 2022-04-04 DIAGNOSIS — Z78.0 POSTMENOPAUSAL: ICD-10-CM

## 2022-04-04 DIAGNOSIS — Z00.00 ENCOUNTER FOR ANNUAL HEALTH EXAMINATION: ICD-10-CM

## 2022-04-04 DIAGNOSIS — M1A.0720 CHRONIC IDIOPATHIC GOUT INVOLVING TOE OF LEFT FOOT WITHOUT TOPHUS: Chronic | ICD-10-CM

## 2022-04-04 PROCEDURE — G0439 PPPS, SUBSEQ VISIT: HCPCS | Performed by: FAMILY MEDICINE

## 2022-04-04 PROCEDURE — 3008F BODY MASS INDEX DOCD: CPT | Performed by: FAMILY MEDICINE

## 2022-04-04 PROCEDURE — 3078F DIAST BP <80 MM HG: CPT | Performed by: FAMILY MEDICINE

## 2022-04-04 PROCEDURE — 96160 PT-FOCUSED HLTH RISK ASSMT: CPT | Performed by: FAMILY MEDICINE

## 2022-04-04 PROCEDURE — 3074F SYST BP LT 130 MM HG: CPT | Performed by: FAMILY MEDICINE

## 2022-04-04 PROCEDURE — 99397 PER PM REEVAL EST PAT 65+ YR: CPT | Performed by: FAMILY MEDICINE

## 2022-04-04 RX ORDER — SIMVASTATIN 10 MG
10 TABLET ORAL NIGHTLY
Qty: 90 TABLET | Refills: 3 | Status: SHIPPED | OUTPATIENT
Start: 2022-04-04

## 2022-04-04 RX ORDER — HYDROCHLOROTHIAZIDE 12.5 MG/1
12.5 TABLET ORAL DAILY
Qty: 90 TABLET | Refills: 3 | Status: SHIPPED | OUTPATIENT
Start: 2022-04-04

## 2022-04-05 ENCOUNTER — TELEPHONE (OUTPATIENT)
Dept: FAMILY MEDICINE CLINIC | Facility: CLINIC | Age: 82
End: 2022-04-05

## 2022-04-05 NOTE — TELEPHONE ENCOUNTER
Received diabetic eye exam results from [facility].  Placed in triage

## 2022-04-14 ENCOUNTER — HOSPITAL ENCOUNTER (OUTPATIENT)
Dept: BONE DENSITY | Age: 82
Discharge: HOME | End: 2022-04-14
Attending: FAMILY MEDICINE
Payer: MEDICARE

## 2022-04-14 DIAGNOSIS — Z78.0 POSTMENOPAUSAL: ICD-10-CM

## 2022-04-14 PROCEDURE — 77080 DXA BONE DENSITY AXIAL: CPT | Performed by: FAMILY MEDICINE

## 2022-04-26 NOTE — TELEPHONE ENCOUNTER
Refill request for:    Requested Prescriptions     Pending Prescriptions Disp Refills   • ALLOPURINOL 300 MG Oral Tab [Pharmacy Med Name: allopurinol 300 mg tablet] 90 tablet 3     Sig: TAKE ONE TABLET BY MOUTH EVERY DAY     Signed Prescriptions Disp Refil Home

## 2022-05-25 DIAGNOSIS — I10 ESSENTIAL HYPERTENSION: Chronic | ICD-10-CM

## 2022-05-25 DIAGNOSIS — F32.5 MAJOR DEPRESSION IN COMPLETE REMISSION (HCC): Chronic | ICD-10-CM

## 2022-05-27 RX ORDER — LOSARTAN POTASSIUM 100 MG/1
TABLET ORAL
Qty: 90 TABLET | Refills: 1 | Status: SHIPPED | OUTPATIENT
Start: 2022-05-27

## 2022-05-27 RX ORDER — ESCITALOPRAM OXALATE 20 MG/1
TABLET ORAL
Qty: 90 TABLET | Refills: 3 | Status: SHIPPED | OUTPATIENT
Start: 2022-05-27

## 2022-05-27 RX ORDER — TRAZODONE HYDROCHLORIDE 50 MG/1
TABLET ORAL
Qty: 90 TABLET | Refills: 3 | Status: SHIPPED | OUTPATIENT
Start: 2022-05-27

## 2022-05-27 NOTE — TELEPHONE ENCOUNTER
Refill request for Losartan, Escitalopram and Trazodone  LOV 4/4/22  Last labs 3/31/22  Losartan refilled per protocol  Please advise on Escitalopram and Trazodone

## 2022-06-01 NOTE — LETTER
5/16/2019    Name: Barbara Kraus    YOB: 1940        To whom it may concern:      Ino Ng is under my care.  She is medically stable to participate in your weight loss program.    Thank you,       Giulia Bhagat MD, 5/16/2019 FINAL REPORT

## 2022-07-13 ENCOUNTER — TELEPHONE (OUTPATIENT)
Dept: FAMILY MEDICINE CLINIC | Facility: CLINIC | Age: 82
End: 2022-07-13

## 2022-07-13 DIAGNOSIS — E03.9 ACQUIRED HYPOTHYROIDISM: Chronic | ICD-10-CM

## 2022-07-13 RX ORDER — LEVOTHYROXINE SODIUM 0.07 MG/1
TABLET ORAL
Qty: 90 TABLET | Refills: 3 | Status: SHIPPED | OUTPATIENT
Start: 2022-07-13

## 2022-07-14 NOTE — TELEPHONE ENCOUNTER
LM for pt to have her labs drawn for her Thyroid prior to appt on 10/19/22 (meds called in) Please place lab

## 2022-07-14 NOTE — TELEPHONE ENCOUNTER
Component      Latest Ref Rng & Units 3/31/2022   T4,Free (Direct)      0.8 - 1.7 ng/dL 1.2   TSH      0.358 - 3.740 mIU/mL 0.273 (L)     Routed to Dr. Naldo Alcaraz - does pt need to repeat thyroid labs?

## 2022-07-15 NOTE — TELEPHONE ENCOUNTER
Called and talked to  told him patient could go get labs doen when she can to get correct dose of thyroid medication.

## 2022-08-10 ENCOUNTER — LAB ENCOUNTER (OUTPATIENT)
Dept: LAB | Age: 82
End: 2022-08-10
Attending: FAMILY MEDICINE
Payer: MEDICARE

## 2022-08-10 DIAGNOSIS — E03.9 ACQUIRED HYPOTHYROIDISM: ICD-10-CM

## 2022-08-10 LAB
T3FREE SERPL-MCNC: 2.25 PG/ML (ref 2.4–4.2)
T4 FREE SERPL-MCNC: 1.2 NG/DL (ref 0.8–1.7)
TSI SER-ACNC: 0.26 MIU/ML (ref 0.36–3.74)

## 2022-08-10 PROCEDURE — 84481 FREE ASSAY (FT-3): CPT

## 2022-08-10 PROCEDURE — 84439 ASSAY OF FREE THYROXINE: CPT

## 2022-08-10 PROCEDURE — 84443 ASSAY THYROID STIM HORMONE: CPT

## 2022-08-10 PROCEDURE — 36415 COLL VENOUS BLD VENIPUNCTURE: CPT

## 2022-08-15 ENCOUNTER — TELEPHONE (OUTPATIENT)
Dept: FAMILY MEDICINE CLINIC | Facility: CLINIC | Age: 82
End: 2022-08-15

## 2022-08-15 DIAGNOSIS — E03.9 ACQUIRED HYPOTHYROIDISM: Chronic | ICD-10-CM

## 2022-08-15 RX ORDER — LEVOTHYROXINE SODIUM 0.05 MG/1
50 TABLET ORAL
Qty: 90 TABLET | Refills: 1 | Status: SHIPPED | OUTPATIENT
Start: 2022-08-15

## 2022-08-15 NOTE — TELEPHONE ENCOUNTER
1. Acquired hypothyroidism  Overview:  Levothyroxine 75  Orders:  -     Levothyroxine Sodium; Take 1 tablet (50 mcg total) by mouth before breakfast.  Dispense: 90 tablet; Refill: 1  -     TSH and Free T4; Future; Expected date: 09/26/2022  -     Free T3 (Triiodothryronine); Future; Expected date: 09/26/2022     Repeat labs in about 2 months, 50 mcg dose and 10  OK to notify.  Thanks, Camilla Navarro MD

## 2022-10-17 ENCOUNTER — LAB ENCOUNTER (OUTPATIENT)
Dept: LAB | Age: 82
End: 2022-10-17
Attending: FAMILY MEDICINE
Payer: MEDICARE

## 2022-10-17 ENCOUNTER — TELEPHONE (OUTPATIENT)
Dept: FAMILY MEDICINE CLINIC | Facility: CLINIC | Age: 82
End: 2022-10-17

## 2022-10-17 DIAGNOSIS — N18.31 TYPE 2 DIABETES MELLITUS WITH STAGE 3A CHRONIC KIDNEY DISEASE, WITHOUT LONG-TERM CURRENT USE OF INSULIN (HCC): ICD-10-CM

## 2022-10-17 DIAGNOSIS — E11.22 TYPE 2 DIABETES MELLITUS WITH STAGE 3A CHRONIC KIDNEY DISEASE, WITHOUT LONG-TERM CURRENT USE OF INSULIN (HCC): ICD-10-CM

## 2022-10-17 DIAGNOSIS — M1A.0720 CHRONIC IDIOPATHIC GOUT INVOLVING TOE OF LEFT FOOT WITHOUT TOPHUS: ICD-10-CM

## 2022-10-17 DIAGNOSIS — N18.30 CKD STAGE 3 SECONDARY TO DIABETES (HCC): ICD-10-CM

## 2022-10-17 DIAGNOSIS — E11.22 CKD STAGE 3 SECONDARY TO DIABETES (HCC): ICD-10-CM

## 2022-10-17 DIAGNOSIS — E03.9 ACQUIRED HYPOTHYROIDISM: Chronic | ICD-10-CM

## 2022-10-17 DIAGNOSIS — E03.9 ACQUIRED HYPOTHYROIDISM: Primary | ICD-10-CM

## 2022-10-17 LAB
ALBUMIN SERPL-MCNC: 3.6 G/DL (ref 3.4–5)
ALBUMIN/GLOB SERPL: 1.2 {RATIO} (ref 1–2)
ALP LIVER SERPL-CCNC: 82 U/L
ALT SERPL-CCNC: 21 U/L
ANION GAP SERPL CALC-SCNC: 7 MMOL/L (ref 0–18)
AST SERPL-CCNC: 21 U/L (ref 15–37)
BASOPHILS # BLD AUTO: 0.02 X10(3) UL (ref 0–0.2)
BASOPHILS NFR BLD AUTO: 0.3 %
BILIRUB SERPL-MCNC: 0.7 MG/DL (ref 0.1–2)
BUN BLD-MCNC: 20 MG/DL (ref 7–18)
CALCIUM BLD-MCNC: 9.1 MG/DL (ref 8.5–10.1)
CHLORIDE SERPL-SCNC: 108 MMOL/L (ref 98–112)
CHOLEST SERPL-MCNC: 125 MG/DL (ref ?–200)
CO2 SERPL-SCNC: 26 MMOL/L (ref 21–32)
CREAT BLD-MCNC: 1.08 MG/DL
EOSINOPHIL # BLD AUTO: 0.15 X10(3) UL (ref 0–0.7)
EOSINOPHIL NFR BLD AUTO: 2.6 %
ERYTHROCYTE [DISTWIDTH] IN BLOOD BY AUTOMATED COUNT: 13.7 %
EST. AVERAGE GLUCOSE BLD GHB EST-MCNC: 123 MG/DL (ref 68–126)
GFR SERPLBLD BASED ON 1.73 SQ M-ARVRAT: 52 ML/MIN/1.73M2 (ref 60–?)
GLOBULIN PLAS-MCNC: 3 G/DL (ref 2.8–4.4)
GLUCOSE BLD-MCNC: 99 MG/DL (ref 70–99)
HBA1C MFR BLD: 5.9 % (ref ?–5.7)
HCT VFR BLD AUTO: 39.3 %
HDLC SERPL-MCNC: 65 MG/DL (ref 40–59)
HGB BLD-MCNC: 13 G/DL
IMM GRANULOCYTES # BLD AUTO: 0.02 X10(3) UL (ref 0–1)
IMM GRANULOCYTES NFR BLD: 0.3 %
LDLC SERPL CALC-MCNC: 45 MG/DL (ref ?–100)
LYMPHOCYTES # BLD AUTO: 1.3 X10(3) UL (ref 1–4)
LYMPHOCYTES NFR BLD AUTO: 22.6 %
MCH RBC QN AUTO: 33 PG (ref 26–34)
MCHC RBC AUTO-ENTMCNC: 33.1 G/DL (ref 31–37)
MCV RBC AUTO: 99.7 FL
MONOCYTES # BLD AUTO: 0.45 X10(3) UL (ref 0.1–1)
MONOCYTES NFR BLD AUTO: 7.8 %
NEUTROPHILS # BLD AUTO: 3.8 X10 (3) UL (ref 1.5–7.7)
NEUTROPHILS # BLD AUTO: 3.8 X10(3) UL (ref 1.5–7.7)
NEUTROPHILS NFR BLD AUTO: 66.4 %
NONHDLC SERPL-MCNC: 60 MG/DL (ref ?–130)
OSMOLALITY SERPL CALC.SUM OF ELEC: 295 MOSM/KG (ref 275–295)
PLATELET # BLD AUTO: 169 10(3)UL (ref 150–450)
POTASSIUM SERPL-SCNC: 3.6 MMOL/L (ref 3.5–5.1)
PROT SERPL-MCNC: 6.6 G/DL (ref 6.4–8.2)
RBC # BLD AUTO: 3.94 X10(6)UL
SODIUM SERPL-SCNC: 141 MMOL/L (ref 136–145)
T3FREE SERPL-MCNC: 2.1 PG/ML (ref 2.4–4.2)
T4 FREE SERPL-MCNC: 0.9 NG/DL (ref 0.8–1.7)
TRIGL SERPL-MCNC: 72 MG/DL (ref 30–149)
TSI SER-ACNC: 2.96 MIU/ML (ref 0.36–3.74)
URATE SERPL-MCNC: 3.3 MG/DL
VLDLC SERPL CALC-MCNC: 10 MG/DL (ref 0–30)
WBC # BLD AUTO: 5.7 X10(3) UL (ref 4–11)

## 2022-10-17 PROCEDURE — 80061 LIPID PANEL: CPT

## 2022-10-17 PROCEDURE — 84550 ASSAY OF BLOOD/URIC ACID: CPT

## 2022-10-17 PROCEDURE — 83036 HEMOGLOBIN GLYCOSYLATED A1C: CPT

## 2022-10-17 PROCEDURE — 84481 FREE ASSAY (FT-3): CPT

## 2022-10-17 PROCEDURE — 84443 ASSAY THYROID STIM HORMONE: CPT

## 2022-10-17 PROCEDURE — 80053 COMPREHEN METABOLIC PANEL: CPT

## 2022-10-17 PROCEDURE — 36415 COLL VENOUS BLD VENIPUNCTURE: CPT

## 2022-10-17 PROCEDURE — 84439 ASSAY OF FREE THYROXINE: CPT

## 2022-10-17 PROCEDURE — 85025 COMPLETE CBC W/AUTO DIFF WBC: CPT

## 2022-10-17 NOTE — TELEPHONE ENCOUNTER
1. Acquired hypothyroidism (Primary)  Overview:  Levothyroxine 75  2. CKD stage 3 secondary to diabetes (HCC)  Overview:  GFR 53, diet controlled Diabetes, losartan -12.5  Orders:  -     CBC With Differential With Platelet; Future; Expected date: 10/17/2022  3. Type 2 diabetes mellitus with stage 3a chronic kidney disease, without long-term current use of insulin (HCC)  Overview:  No Meds, Dx 12/2011 with A1c 6.6%  Orders:  -     Comp Metabolic Panel (14); Future; Expected date: 10/17/2022  -     Lipid Panel; Future; Expected date: 10/17/2022  -     Hemoglobin A1C; Future; Expected date: 10/17/2022  4. Chronic idiopathic gout involving toe of left foot without tophus  Overview:  Allopurinol 300  Orders:  -     Uric Acid; Future; Expected date: 10/17/2022       OK to notify.  Thanks, Honey Mcdonald MD

## 2022-10-17 NOTE — TELEPHONE ENCOUNTER
Pt is calling she wants to know if Dr. Sis Corbett wanted more that just thyroid the lab lily extra blood incase today.  Please call her or fax lab     Lab fax [phone] is the  Eboni Lopez [phone]

## 2022-10-18 NOTE — ASSESSMENT & PLAN NOTE
As for her Diabetes, it is well controlled, no significant medication side effects noted. Recommendations are: continue present meds, lose weight by increased dietary compliance and exercise and will check labs as ordered.     Lab Results   Component Value Date    A1C 5.9 (H) 10/17/2022    A1C 5.8 (H) 03/31/2022
Lab Results   Component Value Date/Time    CREATSERUM 1.08 (H) 10/17/2022 07:44 AM    EGFRCR 52 (L) 10/17/2022 07:44 AM    GFR 63 11/10/2017 08:20 AM    GFRNAA 53 (L) 03/31/2022 08:35 AM      Stable, continue present management
Stable, Continue present management.     Blood Pressure and Cardiac Medications          LOSARTAN 100 MG Oral Tab
Stable, Continue present management.     Cholesterol Lowering Medications          simvastatin 10 MG Oral Tab
Stable, Continue present management.     Thyroid  (most recent labs)   Lab Results   Component Value Date/Time    TSH 2.960 10/17/2022 07:44 AM    T4F 0.9 10/17/2022 07:44 AM         Endocrine Medications          levothyroxine 50 MCG Oral Tab
Stable, continue present management
Relaxed

## 2022-10-19 ENCOUNTER — OFFICE VISIT (OUTPATIENT)
Dept: FAMILY MEDICINE CLINIC | Facility: CLINIC | Age: 82
End: 2022-10-19
Payer: COMMERCIAL

## 2022-10-19 VITALS
RESPIRATION RATE: 18 BRPM | HEART RATE: 80 BPM | DIASTOLIC BLOOD PRESSURE: 80 MMHG | BODY MASS INDEX: 32.69 KG/M2 | HEIGHT: 61.5 IN | WEIGHT: 175.38 LBS | SYSTOLIC BLOOD PRESSURE: 132 MMHG

## 2022-10-19 DIAGNOSIS — M1A.0720 CHRONIC IDIOPATHIC GOUT INVOLVING TOE OF LEFT FOOT WITHOUT TOPHUS: Chronic | ICD-10-CM

## 2022-10-19 DIAGNOSIS — N18.30 CKD STAGE 3 SECONDARY TO DIABETES (HCC): ICD-10-CM

## 2022-10-19 DIAGNOSIS — E78.2 MIXED HYPERLIPIDEMIA: ICD-10-CM

## 2022-10-19 DIAGNOSIS — E11.22 CKD STAGE 3 SECONDARY TO DIABETES (HCC): ICD-10-CM

## 2022-10-19 DIAGNOSIS — F33.42 RECURRENT MAJOR DEPRESSIVE DISORDER, IN FULL REMISSION (HCC): Chronic | ICD-10-CM

## 2022-10-19 DIAGNOSIS — I10 ESSENTIAL HYPERTENSION: Chronic | ICD-10-CM

## 2022-10-19 DIAGNOSIS — N32.81 OVERACTIVE BLADDER: ICD-10-CM

## 2022-10-19 DIAGNOSIS — E03.9 ACQUIRED HYPOTHYROIDISM: Chronic | ICD-10-CM

## 2022-10-19 DIAGNOSIS — N18.31 TYPE 2 DIABETES MELLITUS WITH STAGE 3A CHRONIC KIDNEY DISEASE, WITHOUT LONG-TERM CURRENT USE OF INSULIN (HCC): Primary | ICD-10-CM

## 2022-10-19 DIAGNOSIS — E11.22 TYPE 2 DIABETES MELLITUS WITH STAGE 3A CHRONIC KIDNEY DISEASE, WITHOUT LONG-TERM CURRENT USE OF INSULIN (HCC): Primary | ICD-10-CM

## 2022-10-19 PROCEDURE — 99214 OFFICE O/P EST MOD 30 MIN: CPT | Performed by: FAMILY MEDICINE

## 2022-10-19 PROCEDURE — 3079F DIAST BP 80-89 MM HG: CPT | Performed by: FAMILY MEDICINE

## 2022-10-19 PROCEDURE — 3008F BODY MASS INDEX DOCD: CPT | Performed by: FAMILY MEDICINE

## 2022-10-19 PROCEDURE — 3075F SYST BP GE 130 - 139MM HG: CPT | Performed by: FAMILY MEDICINE

## 2022-10-19 RX ORDER — LOSARTAN POTASSIUM 100 MG/1
100 TABLET ORAL DAILY
Qty: 90 TABLET | Refills: 3 | Status: SHIPPED | OUTPATIENT
Start: 2022-10-19

## 2022-10-19 RX ORDER — ALLOPURINOL 300 MG/1
300 TABLET ORAL DAILY
Qty: 90 TABLET | Refills: 3 | Status: SHIPPED | OUTPATIENT
Start: 2022-10-19

## 2022-10-19 RX ORDER — MIRABEGRON 50 MG/1
1 TABLET, FILM COATED, EXTENDED RELEASE ORAL DAILY
Qty: 90 TABLET | Refills: 3 | Status: SHIPPED | OUTPATIENT
Start: 2022-10-19

## 2022-11-10 RX ORDER — OMEPRAZOLE 40 MG/1
CAPSULE, DELAYED RELEASE ORAL
Qty: 90 CAPSULE | Refills: 3 | Status: SHIPPED | OUTPATIENT
Start: 2022-11-10

## 2023-01-27 ENCOUNTER — OFFICE VISIT (OUTPATIENT)
Dept: FAMILY MEDICINE CLINIC | Facility: CLINIC | Age: 83
End: 2023-01-27
Payer: MEDICARE

## 2023-01-27 VITALS
SYSTOLIC BLOOD PRESSURE: 130 MMHG | TEMPERATURE: 97 F | RESPIRATION RATE: 18 BRPM | DIASTOLIC BLOOD PRESSURE: 60 MMHG | HEART RATE: 93 BPM | OXYGEN SATURATION: 98 %

## 2023-01-27 DIAGNOSIS — L03.012 PARONYCHIA OF FINGER OF LEFT HAND: Primary | ICD-10-CM

## 2023-01-27 DIAGNOSIS — E03.9 ACQUIRED HYPOTHYROIDISM: Chronic | ICD-10-CM

## 2023-01-27 PROCEDURE — 99213 OFFICE O/P EST LOW 20 MIN: CPT | Performed by: NURSE PRACTITIONER

## 2023-01-27 PROCEDURE — 3008F BODY MASS INDEX DOCD: CPT | Performed by: NURSE PRACTITIONER

## 2023-01-27 PROCEDURE — 3078F DIAST BP <80 MM HG: CPT | Performed by: NURSE PRACTITIONER

## 2023-01-27 PROCEDURE — 3075F SYST BP GE 130 - 139MM HG: CPT | Performed by: NURSE PRACTITIONER

## 2023-01-27 RX ORDER — LEVOTHYROXINE SODIUM 0.05 MG/1
50 TABLET ORAL
Qty: 90 TABLET | Refills: 1 | Status: SHIPPED | OUTPATIENT
Start: 2023-01-27

## 2023-01-27 NOTE — PATIENT INSTRUCTIONS
Soak finger in warn/hot water 2-3 times per day for 5-10 minutes each time. After soaking, dry and apply Mupirocin ointment (only apply twice daily) for 7 days (available over the counter). Cover with a band-aid type dressing during the day and leave open to air at night. If any worsening or redness, pain or swelling or any new symptoms notify our office.

## 2023-02-06 ENCOUNTER — TELEPHONE (OUTPATIENT)
Dept: FAMILY MEDICINE CLINIC | Facility: CLINIC | Age: 83
End: 2023-02-06

## 2023-02-06 NOTE — TELEPHONE ENCOUNTER
Please enter lab orders for the patient's upcoming physical appointment. Physical scheduled: Your appointments     Date & Time Appointment Department [facility])    Apr 21, 2023  2:30 PM CDT MA Supervisit with Ellie Bryan MD [address], [address], [place] ([facility])            [address], [address], [facility] Dr Magdalene Vieyraner [address] [place] [address] [id number]         [facility])     The patient has been notified to complete fasting labs prior to their physical appointment.

## 2023-02-14 ENCOUNTER — OFFICE VISIT (OUTPATIENT)
Dept: FAMILY MEDICINE CLINIC | Facility: CLINIC | Age: 83
End: 2023-02-14
Payer: MEDICARE

## 2023-02-14 VITALS
HEIGHT: 61.5 IN | SYSTOLIC BLOOD PRESSURE: 126 MMHG | OXYGEN SATURATION: 98 % | HEART RATE: 96 BPM | BODY MASS INDEX: 33 KG/M2 | DIASTOLIC BLOOD PRESSURE: 78 MMHG

## 2023-02-14 DIAGNOSIS — M79.645 FINGER PAIN, LEFT: Primary | ICD-10-CM

## 2023-02-14 DIAGNOSIS — M10.042 ACUTE IDIOPATHIC GOUT OF LEFT HAND: ICD-10-CM

## 2023-02-14 PROCEDURE — 99213 OFFICE O/P EST LOW 20 MIN: CPT | Performed by: STUDENT IN AN ORGANIZED HEALTH CARE EDUCATION/TRAINING PROGRAM

## 2023-02-14 PROCEDURE — 3074F SYST BP LT 130 MM HG: CPT | Performed by: STUDENT IN AN ORGANIZED HEALTH CARE EDUCATION/TRAINING PROGRAM

## 2023-02-14 PROCEDURE — 3008F BODY MASS INDEX DOCD: CPT | Performed by: STUDENT IN AN ORGANIZED HEALTH CARE EDUCATION/TRAINING PROGRAM

## 2023-02-14 PROCEDURE — 3078F DIAST BP <80 MM HG: CPT | Performed by: STUDENT IN AN ORGANIZED HEALTH CARE EDUCATION/TRAINING PROGRAM

## 2023-02-14 PROCEDURE — 1126F AMNT PAIN NOTED NONE PRSNT: CPT | Performed by: STUDENT IN AN ORGANIZED HEALTH CARE EDUCATION/TRAINING PROGRAM

## 2023-02-14 RX ORDER — PREDNISONE 20 MG/1
40 TABLET ORAL DAILY
Qty: 10 TABLET | Refills: 0 | Status: SHIPPED | OUTPATIENT
Start: 2023-02-14 | End: 2023-02-19

## 2023-02-21 ENCOUNTER — TELEPHONE (OUTPATIENT)
Dept: FAMILY MEDICINE CLINIC | Facility: CLINIC | Age: 83
End: 2023-02-21

## 2023-02-21 DIAGNOSIS — M79.645 PAIN OF LEFT MIDDLE FINGER: Primary | ICD-10-CM

## 2023-02-21 NOTE — TELEPHONE ENCOUNTER
Pt call med is not working well, please let Dr. Ag Morfin Know . predniSONE  predniSONE 20 MG Oral Tab  Take 2 tablets (40 mg total) by mouth daily for 5 days. , Normal, Disp-10 tablet, R-0   Dispense: 10 tablet   Refills: 0 ordered   Pharmacy: [facility] [phone], [phone] (Ph: [phone])

## 2023-02-22 ENCOUNTER — HOSPITAL ENCOUNTER (OUTPATIENT)
Dept: GENERAL RADIOLOGY | Age: 83
Discharge: HOME | End: 2023-02-22
Attending: STUDENT IN AN ORGANIZED HEALTH CARE EDUCATION/TRAINING PROGRAM
Payer: MEDICARE

## 2023-02-22 DIAGNOSIS — M79.645 PAIN OF LEFT MIDDLE FINGER: ICD-10-CM

## 2023-02-22 PROCEDURE — 73130 X-RAY EXAM OF HAND: CPT | Performed by: STUDENT IN AN ORGANIZED HEALTH CARE EDUCATION/TRAINING PROGRAM

## 2023-02-22 NOTE — TELEPHONE ENCOUNTER
If this were gout I would expect improvement on the steroid by now. I will place an order for an Xray to take a look at the finger. Can you please call and let her know?  Thank you

## 2023-02-22 NOTE — TELEPHONE ENCOUNTER
Spoke with Ino giving her 's comments and notified her that a L hand XR has been ordered. Ino verbalized understanding.

## 2023-02-25 NOTE — ASSESSMENT & PLAN NOTE
As for her Diabetes, it is well controlled, no significant medication side effects noted. Recommendations are: continue present meds, lose weight by increased dietary compliance and exercise and will check labs as ordered.     Lab Results   Component Value Date    A1C 5.9 (H) 10/17/2022    A1C 5.8 (H) 03/31/2022

## 2023-02-25 NOTE — ASSESSMENT & PLAN NOTE
Lab Results   Component Value Date/Time    CREATSERUM 1.08 (H) 10/17/2022 07:44 AM    EGFRCR 52 (L) 10/17/2022 07:44 AM    GFR 63 11/10/2017 08:20 AM    GFRNAA 53 (L) 03/31/2022 08:35 AM      Stable, continue present management single room occupancy hotel room CVM no assessed - telepsych eval not assessed as pt was in bed during interview via telepsych limited low end of fair looks much older than stated age; swollen eyes/looks exhausted

## 2023-02-27 ENCOUNTER — OFFICE VISIT (OUTPATIENT)
Dept: FAMILY MEDICINE CLINIC | Facility: CLINIC | Age: 83
End: 2023-02-27
Payer: MEDICARE

## 2023-02-27 VITALS
WEIGHT: 175.19 LBS | HEART RATE: 88 BPM | DIASTOLIC BLOOD PRESSURE: 70 MMHG | SYSTOLIC BLOOD PRESSURE: 122 MMHG | RESPIRATION RATE: 16 BRPM | BODY MASS INDEX: 32.65 KG/M2 | HEIGHT: 61.5 IN

## 2023-02-27 DIAGNOSIS — C50.411 MALIGNANT NEOPLASM OF UPPER-OUTER QUADRANT OF RIGHT BREAST IN FEMALE, ESTROGEN RECEPTOR POSITIVE (HCC): ICD-10-CM

## 2023-02-27 DIAGNOSIS — I10 ESSENTIAL HYPERTENSION: Chronic | ICD-10-CM

## 2023-02-27 DIAGNOSIS — E11.22 TYPE 2 DIABETES MELLITUS WITH STAGE 3A CHRONIC KIDNEY DISEASE, WITHOUT LONG-TERM CURRENT USE OF INSULIN (HCC): Primary | ICD-10-CM

## 2023-02-27 DIAGNOSIS — M1A.0720 CHRONIC IDIOPATHIC GOUT INVOLVING TOE OF LEFT FOOT WITHOUT TOPHUS: Chronic | ICD-10-CM

## 2023-02-27 DIAGNOSIS — I77.9 BILATERAL CAROTID ARTERY DISEASE, UNSPECIFIED TYPE (HCC): ICD-10-CM

## 2023-02-27 DIAGNOSIS — E11.22 CKD STAGE 3 SECONDARY TO DIABETES (HCC): ICD-10-CM

## 2023-02-27 DIAGNOSIS — N18.30 CKD STAGE 3 SECONDARY TO DIABETES (HCC): ICD-10-CM

## 2023-02-27 DIAGNOSIS — F33.42 RECURRENT MAJOR DEPRESSIVE DISORDER, IN FULL REMISSION (HCC): Chronic | ICD-10-CM

## 2023-02-27 DIAGNOSIS — Z00.00 LABORATORY EXAMINATION ORDERED AS PART OF A ROUTINE GENERAL MEDICAL EXAMINATION: ICD-10-CM

## 2023-02-27 DIAGNOSIS — D69.6 THROMBOCYTOPENIA (HCC): ICD-10-CM

## 2023-02-27 DIAGNOSIS — K76.0 FATTY INFILTRATION OF LIVER: ICD-10-CM

## 2023-02-27 DIAGNOSIS — E03.9 ACQUIRED HYPOTHYROIDISM: Chronic | ICD-10-CM

## 2023-02-27 DIAGNOSIS — N18.31 TYPE 2 DIABETES MELLITUS WITH STAGE 3A CHRONIC KIDNEY DISEASE, WITHOUT LONG-TERM CURRENT USE OF INSULIN (HCC): Primary | ICD-10-CM

## 2023-02-27 DIAGNOSIS — Z17.0 MALIGNANT NEOPLASM OF UPPER-OUTER QUADRANT OF RIGHT BREAST IN FEMALE, ESTROGEN RECEPTOR POSITIVE (HCC): ICD-10-CM

## 2023-02-27 DIAGNOSIS — E78.2 MIXED HYPERLIPIDEMIA: ICD-10-CM

## 2023-02-27 DIAGNOSIS — R73.9 HYPERGLYCEMIA: ICD-10-CM

## 2023-02-27 PROCEDURE — 3074F SYST BP LT 130 MM HG: CPT | Performed by: FAMILY MEDICINE

## 2023-02-27 PROCEDURE — 3078F DIAST BP <80 MM HG: CPT | Performed by: FAMILY MEDICINE

## 2023-02-27 PROCEDURE — 3008F BODY MASS INDEX DOCD: CPT | Performed by: FAMILY MEDICINE

## 2023-02-27 PROCEDURE — 99214 OFFICE O/P EST MOD 30 MIN: CPT | Performed by: FAMILY MEDICINE

## 2023-04-14 ENCOUNTER — TELEPHONE (OUTPATIENT)
Dept: FAMILY MEDICINE CLINIC | Facility: CLINIC | Age: 83
End: 2023-04-14

## 2023-04-17 ENCOUNTER — LAB ENCOUNTER (OUTPATIENT)
Dept: LAB | Age: 83
End: 2023-04-17
Attending: FAMILY MEDICINE
Payer: MEDICARE

## 2023-04-17 DIAGNOSIS — E11.22 TYPE 2 DIABETES MELLITUS WITH STAGE 3A CHRONIC KIDNEY DISEASE, WITHOUT LONG-TERM CURRENT USE OF INSULIN (HCC): ICD-10-CM

## 2023-04-17 DIAGNOSIS — N18.31 TYPE 2 DIABETES MELLITUS WITH STAGE 3A CHRONIC KIDNEY DISEASE, WITHOUT LONG-TERM CURRENT USE OF INSULIN (HCC): ICD-10-CM

## 2023-04-17 DIAGNOSIS — E78.2 MIXED HYPERLIPIDEMIA: ICD-10-CM

## 2023-04-17 DIAGNOSIS — R73.9 HYPERGLYCEMIA: ICD-10-CM

## 2023-04-17 DIAGNOSIS — E03.9 ACQUIRED HYPOTHYROIDISM: Chronic | ICD-10-CM

## 2023-04-17 DIAGNOSIS — M1A.0720 CHRONIC IDIOPATHIC GOUT INVOLVING TOE OF LEFT FOOT WITHOUT TOPHUS: Chronic | ICD-10-CM

## 2023-04-17 DIAGNOSIS — N18.30 CKD STAGE 3 SECONDARY TO DIABETES (HCC): ICD-10-CM

## 2023-04-17 DIAGNOSIS — Z00.00 LABORATORY EXAMINATION ORDERED AS PART OF A ROUTINE GENERAL MEDICAL EXAMINATION: ICD-10-CM

## 2023-04-17 DIAGNOSIS — D69.6 THROMBOCYTOPENIA (HCC): ICD-10-CM

## 2023-04-17 DIAGNOSIS — E11.22 CKD STAGE 3 SECONDARY TO DIABETES (HCC): ICD-10-CM

## 2023-04-17 LAB
ALBUMIN SERPL-MCNC: 3.6 G/DL (ref 3.4–5)
ALBUMIN/GLOB SERPL: 1.3 {RATIO} (ref 1–2)
ALP LIVER SERPL-CCNC: 75 U/L
ALT SERPL-CCNC: 32 U/L
ANION GAP SERPL CALC-SCNC: 4 MMOL/L (ref 0–18)
AST SERPL-CCNC: 33 U/L (ref 15–37)
BASOPHILS # BLD AUTO: 0.01 X10(3) UL (ref 0–0.2)
BASOPHILS NFR BLD AUTO: 0.2 %
BILIRUB SERPL-MCNC: 0.8 MG/DL (ref 0.1–2)
BUN BLD-MCNC: 19 MG/DL (ref 7–18)
CALCIUM BLD-MCNC: 9 MG/DL (ref 8.5–10.1)
CHLORIDE SERPL-SCNC: 108 MMOL/L (ref 98–112)
CHOLEST SERPL-MCNC: 122 MG/DL (ref ?–200)
CO2 SERPL-SCNC: 28 MMOL/L (ref 21–32)
CREAT BLD-MCNC: 1 MG/DL
CREAT UR-SCNC: 149 MG/DL
EOSINOPHIL # BLD AUTO: 0.15 X10(3) UL (ref 0–0.7)
EOSINOPHIL NFR BLD AUTO: 3 %
ERYTHROCYTE [DISTWIDTH] IN BLOOD BY AUTOMATED COUNT: 13.5 %
EST. AVERAGE GLUCOSE BLD GHB EST-MCNC: 120 MG/DL (ref 68–126)
FASTING PATIENT LIPID ANSWER: YES
FASTING STATUS PATIENT QL REPORTED: YES
GFR SERPLBLD BASED ON 1.73 SQ M-ARVRAT: 57 ML/MIN/1.73M2 (ref 60–?)
GLOBULIN PLAS-MCNC: 2.7 G/DL (ref 2.8–4.4)
GLUCOSE BLD-MCNC: 91 MG/DL (ref 70–99)
HBA1C MFR BLD: 5.8 % (ref ?–5.7)
HCT VFR BLD AUTO: 39.5 %
HDLC SERPL-MCNC: 54 MG/DL (ref 40–59)
HGB BLD-MCNC: 12.8 G/DL
IMM GRANULOCYTES # BLD AUTO: 0.02 X10(3) UL (ref 0–1)
IMM GRANULOCYTES NFR BLD: 0.4 %
LDLC SERPL CALC-MCNC: 51 MG/DL (ref ?–100)
LYMPHOCYTES # BLD AUTO: 1.09 X10(3) UL (ref 1–4)
LYMPHOCYTES NFR BLD AUTO: 21.8 %
MCH RBC QN AUTO: 31.5 PG (ref 26–34)
MCHC RBC AUTO-ENTMCNC: 32.4 G/DL (ref 31–37)
MCV RBC AUTO: 97.3 FL
MICROALBUMIN UR-MCNC: 0.56 MG/DL
MICROALBUMIN/CREAT 24H UR-RTO: 3.8 UG/MG (ref ?–30)
MONOCYTES # BLD AUTO: 0.42 X10(3) UL (ref 0.1–1)
MONOCYTES NFR BLD AUTO: 8.4 %
NEUTROPHILS # BLD AUTO: 3.32 X10 (3) UL (ref 1.5–7.7)
NEUTROPHILS # BLD AUTO: 3.32 X10(3) UL (ref 1.5–7.7)
NEUTROPHILS NFR BLD AUTO: 66.2 %
NONHDLC SERPL-MCNC: 68 MG/DL (ref ?–130)
OSMOLALITY SERPL CALC.SUM OF ELEC: 292 MOSM/KG (ref 275–295)
PLATELET # BLD AUTO: 154 10(3)UL (ref 150–450)
POTASSIUM SERPL-SCNC: 3.3 MMOL/L (ref 3.5–5.1)
PROT SERPL-MCNC: 6.3 G/DL (ref 6.4–8.2)
RBC # BLD AUTO: 4.06 X10(6)UL
SODIUM SERPL-SCNC: 140 MMOL/L (ref 136–145)
T3FREE SERPL-MCNC: 1.79 PG/ML (ref 2.4–4.2)
T4 FREE SERPL-MCNC: 0.9 NG/DL (ref 0.8–1.7)
TRIGL SERPL-MCNC: 87 MG/DL (ref 30–149)
TSI SER-ACNC: 3.03 MIU/ML (ref 0.36–3.74)
URATE SERPL-MCNC: 2.7 MG/DL
VLDLC SERPL CALC-MCNC: 12 MG/DL (ref 0–30)
WBC # BLD AUTO: 5 X10(3) UL (ref 4–11)

## 2023-04-17 PROCEDURE — 85025 COMPLETE CBC W/AUTO DIFF WBC: CPT

## 2023-04-17 PROCEDURE — 82043 UR ALBUMIN QUANTITATIVE: CPT

## 2023-04-17 PROCEDURE — 80053 COMPREHEN METABOLIC PANEL: CPT

## 2023-04-17 PROCEDURE — 84443 ASSAY THYROID STIM HORMONE: CPT

## 2023-04-17 PROCEDURE — 36415 COLL VENOUS BLD VENIPUNCTURE: CPT

## 2023-04-17 PROCEDURE — 84481 FREE ASSAY (FT-3): CPT

## 2023-04-17 PROCEDURE — 84550 ASSAY OF BLOOD/URIC ACID: CPT

## 2023-04-17 PROCEDURE — 80061 LIPID PANEL: CPT

## 2023-04-17 PROCEDURE — 83036 HEMOGLOBIN GLYCOSYLATED A1C: CPT

## 2023-04-17 PROCEDURE — 84439 ASSAY OF FREE THYROXINE: CPT

## 2023-04-17 PROCEDURE — 82570 ASSAY OF URINE CREATININE: CPT

## 2023-04-21 ENCOUNTER — OFFICE VISIT (OUTPATIENT)
Dept: FAMILY MEDICINE CLINIC | Facility: CLINIC | Age: 83
End: 2023-04-21
Payer: MEDICARE

## 2023-04-21 VITALS
BODY MASS INDEX: 30.42 KG/M2 | HEART RATE: 86 BPM | HEIGHT: 61.5 IN | DIASTOLIC BLOOD PRESSURE: 60 MMHG | WEIGHT: 163.19 LBS | SYSTOLIC BLOOD PRESSURE: 124 MMHG | RESPIRATION RATE: 16 BRPM

## 2023-04-21 DIAGNOSIS — D69.6 THROMBOCYTOPENIA (HCC): ICD-10-CM

## 2023-04-21 DIAGNOSIS — M20.41 HAMMER TOES OF BOTH FEET: Chronic | ICD-10-CM

## 2023-04-21 DIAGNOSIS — E87.6 HYPOKALEMIA: ICD-10-CM

## 2023-04-21 DIAGNOSIS — C50.411 MALIGNANT NEOPLASM OF UPPER-OUTER QUADRANT OF RIGHT BREAST IN FEMALE, ESTROGEN RECEPTOR POSITIVE (HCC): ICD-10-CM

## 2023-04-21 DIAGNOSIS — M20.42 HAMMER TOES OF BOTH FEET: Chronic | ICD-10-CM

## 2023-04-21 DIAGNOSIS — N18.31 TYPE 2 DIABETES MELLITUS WITH STAGE 3A CHRONIC KIDNEY DISEASE, WITHOUT LONG-TERM CURRENT USE OF INSULIN (HCC): ICD-10-CM

## 2023-04-21 DIAGNOSIS — N32.81 OVERACTIVE BLADDER: ICD-10-CM

## 2023-04-21 DIAGNOSIS — N99.3 PELVIC RELAXATION DUE TO VAGINAL VAULT PROLAPSE, POSTHYSTERECTOMY: ICD-10-CM

## 2023-04-21 DIAGNOSIS — E78.2 MIXED HYPERLIPIDEMIA: ICD-10-CM

## 2023-04-21 DIAGNOSIS — K21.9 GASTROESOPHAGEAL REFLUX DISEASE WITHOUT ESOPHAGITIS: ICD-10-CM

## 2023-04-21 DIAGNOSIS — K76.0 FATTY INFILTRATION OF LIVER: ICD-10-CM

## 2023-04-21 DIAGNOSIS — Z00.00 ANNUAL PHYSICAL EXAM: Primary | ICD-10-CM

## 2023-04-21 DIAGNOSIS — E11.22 TYPE 2 DIABETES MELLITUS WITH STAGE 3A CHRONIC KIDNEY DISEASE, WITHOUT LONG-TERM CURRENT USE OF INSULIN (HCC): ICD-10-CM

## 2023-04-21 DIAGNOSIS — I10 ESSENTIAL HYPERTENSION: Chronic | ICD-10-CM

## 2023-04-21 DIAGNOSIS — M1A.0720 CHRONIC IDIOPATHIC GOUT INVOLVING TOE OF LEFT FOOT WITHOUT TOPHUS: Chronic | ICD-10-CM

## 2023-04-21 DIAGNOSIS — Z00.00 ENCOUNTER FOR ANNUAL HEALTH EXAMINATION: ICD-10-CM

## 2023-04-21 DIAGNOSIS — F33.42 RECURRENT MAJOR DEPRESSIVE DISORDER, IN FULL REMISSION (HCC): Chronic | ICD-10-CM

## 2023-04-21 DIAGNOSIS — I77.9 BILATERAL CAROTID ARTERY DISEASE, UNSPECIFIED TYPE (HCC): ICD-10-CM

## 2023-04-21 DIAGNOSIS — E11.22 CKD STAGE 3 SECONDARY TO DIABETES (HCC): ICD-10-CM

## 2023-04-21 DIAGNOSIS — E03.9 ACQUIRED HYPOTHYROIDISM: Chronic | ICD-10-CM

## 2023-04-21 DIAGNOSIS — Z17.0 MALIGNANT NEOPLASM OF UPPER-OUTER QUADRANT OF RIGHT BREAST IN FEMALE, ESTROGEN RECEPTOR POSITIVE (HCC): ICD-10-CM

## 2023-04-21 DIAGNOSIS — N18.30 CKD STAGE 3 SECONDARY TO DIABETES (HCC): ICD-10-CM

## 2023-04-21 RX ORDER — SPIRONOLACTONE 25 MG/1
25 TABLET ORAL DAILY
Qty: 90 TABLET | Refills: 3 | Status: SHIPPED | OUTPATIENT
Start: 2023-04-21 | End: 2024-04-15

## 2023-04-21 RX ORDER — HYDROCHLOROTHIAZIDE 12.5 MG/1
12.5 TABLET ORAL DAILY
Qty: 90 TABLET | Refills: 3 | Status: SHIPPED | OUTPATIENT
Start: 2023-04-21 | End: 2023-04-21

## 2023-04-21 RX ORDER — SIMVASTATIN 10 MG
10 TABLET ORAL NIGHTLY
Qty: 90 TABLET | Refills: 3 | Status: SHIPPED | OUTPATIENT
Start: 2023-04-21

## 2023-04-21 NOTE — ASSESSMENT & PLAN NOTE
BP shows good control with last BP of 124/60. Continue lifestyle changes, diet, exercise and weight loss. Last K was 3.3 done on 4/17/2023. Last Cr was 1 done on 4/17/2023. Hypertension meds include losartan 100 MG Oral Tab [[id number]], spironolactone 25 MG Oral Tab [[id number]].      stable, continue present management

## 2023-04-21 NOTE — ASSESSMENT & PLAN NOTE
Stable, Continue present management.     Cholesterol Lowering Medications          simvastatin 10 MG Oral Tab        st

## 2023-04-21 NOTE — ASSESSMENT & PLAN NOTE
Last Platelet value was 808 done 4/17/2023. Lowest level in the last 10 years was 125.  stable, continue present management

## 2023-04-21 NOTE — ASSESSMENT & PLAN NOTE
A1c is 5.8 done 4/17/2023 which shows Excellent control. Continue current meds and lifestyle modification. Not currently on Diabetic meds.  stable, continue present management

## 2023-04-21 NOTE — ASSESSMENT & PLAN NOTE
Thyroid shows Good control. TSH: 3.03, done on 4/17/2023. FT4: 0.9, done on 4/17/2023. FT3: 1.79, done on 4/17/2023. Thyroid therapy includes levothyroxine 50 MCG Oral Tab [[id number]].

## 2023-04-21 NOTE — ASSESSMENT & PLAN NOTE
Gout shows Good control. Uric Acid: 2.7, done on 4/17/2023. Last Cr was 1 done on 4/17/2023. Last eGFR was 57 on 4/17/2023.   Gout Meds: allopurinol Tabs - 300 MG

## 2023-05-05 ENCOUNTER — TELEPHONE (OUTPATIENT)
Dept: FAMILY MEDICINE CLINIC | Facility: CLINIC | Age: 83
End: 2023-05-05

## 2023-05-05 DIAGNOSIS — I10 ESSENTIAL HYPERTENSION: Chronic | ICD-10-CM

## 2023-05-05 RX ORDER — HYDROCHLOROTHIAZIDE 12.5 MG/1
12.5 TABLET ORAL DAILY
Qty: 90 TABLET | Refills: 3 | Status: SHIPPED | OUTPATIENT
Start: 2023-05-05

## 2023-05-05 NOTE — TELEPHONE ENCOUNTER
Pt is calling back and she read all the side affects for low potassium spironolactone 25 mg and she is not going to take it she wants to back on the Hydrochlorothiazide and she will eat foods rich in potassium instead please call in her Hydrochlorothiazide he took her off of because it interacts with the spironolactone.

## 2023-05-05 NOTE — TELEPHONE ENCOUNTER
Ok    1. Essential hypertension  Overview:  Losartan -12.5  Orders:  -     hydroCHLOROthiazide; Take 1 tablet (12.5 mg total) by mouth daily. Dispense: 90 tablet;  Refill: 3

## 2023-05-05 NOTE — ASSESSMENT & PLAN NOTE
BP shows good control with last BP of 124/60. Continue lifestyle changes, diet, exercise and weight loss. Last K was 3.3 done on 4/17/2023. Last Cr was 1 done on 4/17/2023. Hypertension meds include hydroCHLOROthiazide 12.5 MG Oral Tab [[id number]], losartan 100 MG Oral Tab [[id number]].

## 2023-05-11 DIAGNOSIS — F32.5 MAJOR DEPRESSION IN COMPLETE REMISSION (HCC): Chronic | ICD-10-CM

## 2023-05-16 RX ORDER — ESCITALOPRAM OXALATE 20 MG/1
TABLET ORAL
Qty: 90 TABLET | Refills: 3 | Status: SHIPPED | OUTPATIENT
Start: 2023-05-16

## 2023-07-23 DIAGNOSIS — E03.9 ACQUIRED HYPOTHYROIDISM: Chronic | ICD-10-CM

## 2023-07-24 RX ORDER — LEVOTHYROXINE SODIUM 0.05 MG/1
50 TABLET ORAL
Qty: 90 TABLET | Refills: 0 | Status: SHIPPED | OUTPATIENT
Start: 2023-07-24

## 2023-07-24 NOTE — TELEPHONE ENCOUNTER
Requested Prescriptions     Pending Prescriptions Disp Refills    LEVOTHYROXINE 50 MCG Oral Tab [Pharmacy Med Name: levothyroxine 50 mcg tablet] 90 tablet 1     Sig: TAKE ONE TABLET BY MOUTH BEFORE BREAKFAST     LOV 4/21/2023     Patient was asked to follow-up in: 6 months    Appointment scheduled: 10/23/2023 Ellie Bryan MD     Medication refilled per protocol.

## 2023-08-23 RX ORDER — TRAZODONE HYDROCHLORIDE 50 MG/1
50 TABLET ORAL NIGHTLY
Qty: 90 TABLET | Refills: 3 | Status: SHIPPED | OUTPATIENT
Start: 2023-08-23

## 2023-08-23 NOTE — TELEPHONE ENCOUNTER
Refill request for:    Requested Prescriptions     Pending Prescriptions Disp Refills    TRAZODONE 50 MG Oral Tab [Pharmacy Med Name: trazodone 50 mg tablet] 90 tablet 3     Sig: TAKE ONE TABLET BY MOUTH NIGHTLY        Last Prescribed Quantity Refills   5/27/22 90 3     LOV 4/21/2023     Patient was asked to follow-up in: 6 months    Appointment due: October 2023    Appointment scheduled: 10/23/2023 Robbie Masterson MD    Medication not on protocol.

## 2023-09-22 DIAGNOSIS — M79.645 FINGER PAIN, LEFT: ICD-10-CM

## 2023-09-22 DIAGNOSIS — M10.042 ACUTE IDIOPATHIC GOUT OF LEFT HAND: ICD-10-CM

## 2023-09-22 RX ORDER — PREDNISONE 20 MG/1
40 TABLET ORAL DAILY
Qty: 10 TABLET | Refills: 0 | OUTPATIENT
Start: 2023-09-22

## 2023-09-25 NOTE — TELEPHONE ENCOUNTER
No symptoms Requested Prescriptions     Pending Prescriptions Disp Refills   • LOSARTAN POTASSIUM-HCTZ 100-12.5 MG Oral Tab [Pharmacy Med Name: LOSARTAN-HYDROCHLOROTHIAZIDE 100-12.5MG TABLET] 90 tablet 3     Sig: TAKE 1 TABLET BY MOUTH ONCE DAILY.    • TRAZODONE HCL 50

## 2023-10-19 ENCOUNTER — LAB ENCOUNTER (OUTPATIENT)
Dept: LAB | Age: 83
End: 2023-10-19
Attending: FAMILY MEDICINE
Payer: MEDICARE

## 2023-10-19 DIAGNOSIS — E87.6 HYPOKALEMIA: ICD-10-CM

## 2023-10-19 LAB
CORTIS SERPL-MCNC: 33.9 UG/DL
CREAT BLD-MCNC: 1.15 MG/DL
EGFRCR SERPLBLD CKD-EPI 2021: 48 ML/MIN/1.73M2 (ref 60–?)
POTASSIUM SERPL-SCNC: 3.7 MMOL/L (ref 3.5–5.1)

## 2023-10-19 PROCEDURE — 84132 ASSAY OF SERUM POTASSIUM: CPT

## 2023-10-19 PROCEDURE — 82565 ASSAY OF CREATININE: CPT

## 2023-10-19 PROCEDURE — 82533 TOTAL CORTISOL: CPT

## 2023-10-19 PROCEDURE — 36415 COLL VENOUS BLD VENIPUNCTURE: CPT

## 2023-10-20 PROBLEM — R79.89 ELEVATED MORNING SERUM CORTISOL LEVEL: Status: ACTIVE | Noted: 2023-10-20

## 2023-10-20 NOTE — ASSESSMENT & PLAN NOTE
A1c is 5.8 done 4/17/2023 which shows Excellent control. Continue current meds and lifestyle modification. Not currently on Diabetic meds.

## 2023-10-20 NOTE — ASSESSMENT & PLAN NOTE
Cholesterol shows Good control. Long term heart-healthy diet and lifestyle discussed and encouraged to reduce risk of cardiovascular disease. Cholesterol: 122, done on 4/17/2023. HDL Cholesterol: 54, done on 4/17/2023. TriGlycerides 87, done on 4/17/2023. LDL Cholesterol: 51, done on 4/17/2023. Cholesterol medications include simvastatin 10 MG Oral Tab [[id number]].

## 2023-10-20 NOTE — ASSESSMENT & PLAN NOTE
BP shows good control with last BP of 124/60. Continue lifestyle changes, diet, exercise and weight loss. Last K was 3.7 done on 10/19/2023. Last Cr was 1.15 done on 10/19/2023. Hypertension meds include hydroCHLOROthiazide 12.5 MG Oral Tab [[id number]], losartan 100 MG Oral Tab [[id number]].

## 2023-10-20 NOTE — ASSESSMENT & PLAN NOTE
Thyroid shows Good control. TSH: 3.03, done on 4/17/2023. FT4: 0.9, done on 4/17/2023. FT3: 1.79, done on 4/17/2023. Thyroid therapy includes levothyroxine 50 MCG Oral Tab [[id number]].

## 2023-10-20 NOTE — ASSESSMENT & PLAN NOTE
Clinical Course: stable   Good control  Antidepressant Meds: escitalopram Tabs - 20 MG; traZODone Tabs - 50 MG

## 2023-10-23 ENCOUNTER — OFFICE VISIT (OUTPATIENT)
Dept: FAMILY MEDICINE CLINIC | Facility: CLINIC | Age: 83
End: 2023-10-23
Payer: MEDICARE

## 2023-10-23 VITALS
BODY MASS INDEX: 30.6 KG/M2 | RESPIRATION RATE: 14 BRPM | HEART RATE: 78 BPM | WEIGHT: 164.19 LBS | HEIGHT: 61.5 IN | DIASTOLIC BLOOD PRESSURE: 80 MMHG | SYSTOLIC BLOOD PRESSURE: 116 MMHG

## 2023-10-23 DIAGNOSIS — N18.30 CKD STAGE 3 SECONDARY TO DIABETES (HCC): ICD-10-CM

## 2023-10-23 DIAGNOSIS — R79.89 ELEVATED MORNING SERUM CORTISOL LEVEL: ICD-10-CM

## 2023-10-23 DIAGNOSIS — K21.9 GASTROESOPHAGEAL REFLUX DISEASE WITHOUT ESOPHAGITIS: ICD-10-CM

## 2023-10-23 DIAGNOSIS — E11.22 TYPE 2 DIABETES MELLITUS WITH STAGE 3A CHRONIC KIDNEY DISEASE, WITHOUT LONG-TERM CURRENT USE OF INSULIN (HCC): ICD-10-CM

## 2023-10-23 DIAGNOSIS — I10 ESSENTIAL HYPERTENSION: Primary | Chronic | ICD-10-CM

## 2023-10-23 DIAGNOSIS — N18.31 TYPE 2 DIABETES MELLITUS WITH STAGE 3A CHRONIC KIDNEY DISEASE, WITHOUT LONG-TERM CURRENT USE OF INSULIN (HCC): ICD-10-CM

## 2023-10-23 DIAGNOSIS — E03.9 ACQUIRED HYPOTHYROIDISM: Chronic | ICD-10-CM

## 2023-10-23 DIAGNOSIS — F33.42 RECURRENT MAJOR DEPRESSIVE DISORDER, IN FULL REMISSION (HCC): Chronic | ICD-10-CM

## 2023-10-23 DIAGNOSIS — E78.2 MIXED HYPERLIPIDEMIA: ICD-10-CM

## 2023-10-23 DIAGNOSIS — E11.22 CKD STAGE 3 SECONDARY TO DIABETES (HCC): ICD-10-CM

## 2023-10-23 RX ORDER — FAMOTIDINE 20 MG/1
20 TABLET, FILM COATED ORAL 2 TIMES DAILY
Qty: 180 TABLET | Refills: 3 | Status: SHIPPED | OUTPATIENT
Start: 2023-10-23

## 2023-10-23 RX ORDER — OMEPRAZOLE 40 MG/1
40 CAPSULE, DELAYED RELEASE ORAL
Qty: 90 CAPSULE | Refills: 3 | Status: SHIPPED | OUTPATIENT
Start: 2023-10-23

## 2023-10-23 RX ORDER — LOSARTAN POTASSIUM 100 MG/1
100 TABLET ORAL DAILY
Qty: 90 TABLET | Refills: 3 | Status: SHIPPED | OUTPATIENT
Start: 2023-10-23

## 2023-10-23 RX ORDER — LEVOTHYROXINE SODIUM 0.05 MG/1
50 TABLET ORAL
Qty: 90 TABLET | Refills: 3 | Status: SHIPPED | OUTPATIENT
Start: 2023-10-23

## 2023-10-25 ENCOUNTER — LAB ENCOUNTER (OUTPATIENT)
Dept: LAB | Age: 83
End: 2023-10-25
Attending: FAMILY MEDICINE

## 2023-10-25 DIAGNOSIS — R79.89 ELEVATED MORNING SERUM CORTISOL LEVEL: ICD-10-CM

## 2023-10-25 PROCEDURE — 82533 TOTAL CORTISOL: CPT

## 2023-10-31 LAB — SALIVARY CORTISOL MS: 0.02 UG/DL

## 2023-11-14 ENCOUNTER — OFFICE VISIT (OUTPATIENT)
Facility: CLINIC | Age: 83
End: 2023-11-14
Payer: MEDICARE

## 2023-11-14 VITALS
WEIGHT: 164 LBS | SYSTOLIC BLOOD PRESSURE: 102 MMHG | HEART RATE: 70 BPM | HEIGHT: 61 IN | RESPIRATION RATE: 14 BRPM | BODY MASS INDEX: 30.96 KG/M2 | DIASTOLIC BLOOD PRESSURE: 62 MMHG | OXYGEN SATURATION: 97 %

## 2023-11-14 DIAGNOSIS — R79.89 ELEVATED MORNING SERUM CORTISOL LEVEL: Primary | ICD-10-CM

## 2023-11-14 PROCEDURE — 1160F RVW MEDS BY RX/DR IN RCRD: CPT | Performed by: STUDENT IN AN ORGANIZED HEALTH CARE EDUCATION/TRAINING PROGRAM

## 2023-11-14 PROCEDURE — 3078F DIAST BP <80 MM HG: CPT | Performed by: STUDENT IN AN ORGANIZED HEALTH CARE EDUCATION/TRAINING PROGRAM

## 2023-11-14 PROCEDURE — 3008F BODY MASS INDEX DOCD: CPT | Performed by: STUDENT IN AN ORGANIZED HEALTH CARE EDUCATION/TRAINING PROGRAM

## 2023-11-14 PROCEDURE — 3074F SYST BP LT 130 MM HG: CPT | Performed by: STUDENT IN AN ORGANIZED HEALTH CARE EDUCATION/TRAINING PROGRAM

## 2023-11-14 PROCEDURE — 1159F MED LIST DOCD IN RCRD: CPT | Performed by: STUDENT IN AN ORGANIZED HEALTH CARE EDUCATION/TRAINING PROGRAM

## 2023-11-14 PROCEDURE — 99205 OFFICE O/P NEW HI 60 MIN: CPT | Performed by: STUDENT IN AN ORGANIZED HEALTH CARE EDUCATION/TRAINING PROGRAM

## 2023-11-14 RX ORDER — DEXAMETHASONE 1 MG
1 TABLET ORAL ONCE
Qty: 1 TABLET | Refills: 0 | Status: SHIPPED | OUTPATIENT
Start: 2023-11-14 | End: 2023-11-14

## 2023-11-14 NOTE — PATIENT INSTRUCTIONS
Return Visit   [ x ] Physician in 3 months   [  ] In person or video visit  [  ] In person only    [ x ] After visit summary   [ x ] Placed labs/imaging. Labs are to be drawn at [address] and fasting. [  ] Central scheduling # for ultrasound/nuclear med/CT/MRI/DXA  [  ] Directions to 1st floor lab  [  ] Dental clearance form  [  ] Will need authorization for outside records  [  ] Give blood sugar log       It was great seeing you today! Today we discussed your elevated cortisol level:  -You had an 8 AM test done on 10/19 which showed an elevated cortisol.  You then had a midnight salivary test done with did not show elevated cortisol  -I will have you complete 1 more test. This is a dexamethasone suppression test to rule out excess cortisol production  -Once your labs result, I will keep you updated with next steps  -If all your blood work is normal, you can continue to follow up with your primary doctor     Take care!  -Dr. Alaina Frank

## 2023-11-16 ENCOUNTER — TELEPHONE (OUTPATIENT)
Dept: FAMILY MEDICINE CLINIC | Facility: CLINIC | Age: 83
End: 2023-11-16

## 2023-11-16 DIAGNOSIS — R79.89 ELEVATED MORNING SERUM CORTISOL LEVEL: Primary | ICD-10-CM

## 2023-11-16 RX ORDER — DEXAMETHASONE 1 MG
TABLET ORAL
Qty: 1 TABLET | Refills: 0 | Status: SHIPPED | OUTPATIENT
Start: 2023-11-16

## 2023-11-16 NOTE — TELEPHONE ENCOUNTER
Pt phoned RN stating that she accidentally threw away her Dexamethasone pill; \"I put the pill on the cap and threw it in the garbage\". Pt requesting another dose of Dexamethasone. Pt stated she changed her labs to tomorrow AM.    Upon pt's chart review, patient's  already phoned office this AM and spoke to [facility] about this same incident. Dexamethasone already called in to [address] and has been processed; pt notified. Will close encounter.

## 2023-11-16 NOTE — TELEPHONE ENCOUNTER
Please enter lab orders for the patient's upcoming physical appointment.     Physical scheduled:   Your appointments       Date & Time Appointment Department ([place])    May 16, 2024 11:00 AM CDT MA Supervisit with Oren Hernandez MD [facility] ([facility])              [facility]  [address]  [facility] [address]  [phone]           Preferred lab: [facility] ([facility])     The patient has been notified to complete fasting labs prior to their physical appointment.

## 2023-11-16 NOTE — TELEPHONE ENCOUNTER
Received call into office from patients  per Justina Finder states that wife accidentally threw away Dexamethasone pill. Patient is in need of a new RX to [facility]. RX pended and routed for review.

## 2023-11-17 ENCOUNTER — LAB ENCOUNTER (OUTPATIENT)
Dept: LAB | Age: 83
End: 2023-11-17
Attending: STUDENT IN AN ORGANIZED HEALTH CARE EDUCATION/TRAINING PROGRAM
Payer: MEDICARE

## 2023-11-17 DIAGNOSIS — R79.89 ELEVATED MORNING SERUM CORTISOL LEVEL: ICD-10-CM

## 2023-11-17 LAB — CORTIS SERPL-MCNC: 2.6 UG/DL

## 2023-11-17 PROCEDURE — 82533 TOTAL CORTISOL: CPT

## 2023-11-17 PROCEDURE — 80299 QUANTITATIVE ASSAY DRUG: CPT

## 2023-11-17 PROCEDURE — 36415 COLL VENOUS BLD VENIPUNCTURE: CPT

## 2023-11-17 PROCEDURE — 82024 ASSAY OF ACTH: CPT

## 2023-11-18 LAB — ACTH: 4.3 PG/ML

## 2023-11-19 DIAGNOSIS — N32.81 OVERACTIVE BLADDER: ICD-10-CM

## 2023-11-20 RX ORDER — MIRABEGRON 50 MG/1
50 TABLET, FILM COATED, EXTENDED RELEASE ORAL DAILY
Qty: 90 TABLET | Refills: 3 | Status: SHIPPED | OUTPATIENT
Start: 2023-11-20

## 2023-11-20 NOTE — TELEPHONE ENCOUNTER
Refill request for:    Requested Prescriptions     Pending Prescriptions Disp Refills    MYRBETRIQ 50 MG Oral Tablet 24 Hr [Pharmacy Med Name: Myrbetriq 50 mg tablet,extended release] 90 tablet 3     Sig: TAKE ONE TABLET BY MOUTH DAILY        Last Prescribed Quantity Refills   10/19/22 90 3     LOV 10/23/2023     Patient was asked to follow-up in: 6 months    Appointment scheduled: 5/16/2024 Yuko Ho MD    Medication not on protocol. # 90 with 3 refills routed to Nighat Isidro MD for review.

## 2023-11-27 LAB — DEXAMETHASONE, SERUM: 345 NG/DL

## 2023-11-28 ENCOUNTER — TELEPHONE (OUTPATIENT)
Facility: CLINIC | Age: 83
End: 2023-11-28

## 2023-11-28 DIAGNOSIS — R79.89 ELEVATED MORNING SERUM CORTISOL LEVEL: Primary | ICD-10-CM

## 2023-11-28 NOTE — TELEPHONE ENCOUNTER
Spoke with patient on telephone re: result notes from labs dated 11/17/23. Patient agreeable to having salivary cortisol testing done. Pending orders, routing to Dr. Gene Izaguirre for sign off. MyChart directions sent for testing.

## 2023-11-29 ENCOUNTER — LAB ENCOUNTER (OUTPATIENT)
Dept: LAB | Age: 83
End: 2023-11-29
Attending: STUDENT IN AN ORGANIZED HEALTH CARE EDUCATION/TRAINING PROGRAM
Payer: MEDICARE

## 2023-11-29 DIAGNOSIS — R79.89 ELEVATED MORNING SERUM CORTISOL LEVEL: ICD-10-CM

## 2023-11-29 PROCEDURE — 82533 TOTAL CORTISOL: CPT

## 2023-11-30 PROCEDURE — 82533 TOTAL CORTISOL: CPT

## 2023-12-07 DIAGNOSIS — R79.89 ELEVATED MORNING SERUM CORTISOL LEVEL: Primary | ICD-10-CM

## 2023-12-07 LAB
SALIVARY CORTISOL MS: 0.04 UG/DL
SALIVARY CORTISOL MS: 0.06 UG/DL

## 2023-12-11 ENCOUNTER — LAB ENCOUNTER (OUTPATIENT)
Dept: LAB | Age: 83
End: 2023-12-11
Attending: STUDENT IN AN ORGANIZED HEALTH CARE EDUCATION/TRAINING PROGRAM
Payer: MEDICARE

## 2023-12-11 DIAGNOSIS — R79.89 ELEVATED MORNING SERUM CORTISOL LEVEL: ICD-10-CM

## 2023-12-11 PROCEDURE — 82530 CORTISOL FREE: CPT

## 2023-12-11 PROCEDURE — 82570 ASSAY OF URINE CREATININE: CPT

## 2023-12-12 LAB
CREAT UR-SCNC: 1.07 G/24 HR (ref 0.6–1.8)
SPECIMEN VOL UR: 1250 ML

## 2023-12-15 LAB
CORTISOL FREE 24H UR: 13 UG/24 HR
CORTISOL FREE UR: 10 UG/L
CREAT 24H UR: 921 MG/24 HR
CREAT UR: 73.7 MG/DL

## 2024-01-17 DIAGNOSIS — M1A.0720 CHRONIC IDIOPATHIC GOUT INVOLVING TOE OF LEFT FOOT WITHOUT TOPHUS: Chronic | ICD-10-CM

## 2024-01-17 RX ORDER — ALLOPURINOL 300 MG/1
300 TABLET ORAL DAILY
Qty: 90 TABLET | Refills: 3 | Status: SHIPPED | OUTPATIENT
Start: 2024-01-17

## 2024-01-17 NOTE — TELEPHONE ENCOUNTER
Pt call for medication refill      allopurinol 300 MG Oral Tab         [facility] - [place], [place] - [address] [phone], [phone] [address] [facility] [address] Phone: [phone] Fax: [phone] Hours: Not open 24 hours

## 2024-02-15 ENCOUNTER — OFFICE VISIT (OUTPATIENT)
Facility: CLINIC | Age: 84
End: 2024-02-15
Payer: MEDICARE

## 2024-02-15 VITALS
BODY MASS INDEX: 31 KG/M2 | OXYGEN SATURATION: 96 % | SYSTOLIC BLOOD PRESSURE: 138 MMHG | HEIGHT: 61 IN | HEART RATE: 86 BPM | DIASTOLIC BLOOD PRESSURE: 76 MMHG

## 2024-02-15 DIAGNOSIS — M85.852 OSTEOPENIA OF NECK OF LEFT FEMUR: Primary | ICD-10-CM

## 2024-02-15 DIAGNOSIS — R79.89 ELEVATED MORNING SERUM CORTISOL LEVEL: ICD-10-CM

## 2024-02-15 PROCEDURE — 99214 OFFICE O/P EST MOD 30 MIN: CPT | Performed by: STUDENT IN AN ORGANIZED HEALTH CARE EDUCATION/TRAINING PROGRAM

## 2024-02-15 NOTE — PATIENT INSTRUCTIONS
Return Visit   [ x ] Physician in 2.5 months   [  ] In person or video visit  [  ] In person only    [ x ] After visit summary    [ x ] Central scheduling # for ultrasound/nuclear med/CT/MRI/DXA    It was great seeing you today!    Today we discussed your labs:  -We reviewed your cortisol testing. You have 2 out of 3 negative cortisol test results  -We also reviewed your history of osteopenia  -Your last bone density was 4/2022 and showed the lowest t-score was in the left femoral neck  -You are due for repeat bone density 4/2024 and we can plan on following up around 5/6 onwards  -Please continue vitamin D 1000 units daily and continue calcium intake with diet (total goal is 1200 mg daily)     Take care!  -Dr. Jj

## 2024-02-15 NOTE — PROGRESS NOTES
ENDOCRINOLOGY, DIABETES, & METABOLISM NOTE     Subjective:   Ino Cuevas is a 83 year old female who presents for elevated cortisol.     Initial HPI 11/2023  Patient states she was in her usual state of health until around about a year ago when she noticed prominence of a dorsocervical fat pad.  She endorses a diagnosis of scoliosis for around the past 11 years and initially thought her dorsocervical fat pad was secondary to this.  She brought this up to her primary care doctor and because of her symptoms and history of intermittent hypokalemia since 2012, she underwent a 8 AM cortisol test on 10/19/2023 which came back elevated to 33.9 with a potassium of 3.7.  She then underwent a midnight salivary test which was negative (0.017).  She presents today to discuss these test results. She notes intentional weight loss of around 20 pounds since last winter and intentional weight gain of 5 pounds recently. She endorses proximal muscle weakness over the last year and slight roundness of face over the last year. She denies easy bruising, thin skin, diabetes, hyperlipidemia, osteoporosis or fractures. She underwent 4/2022 DXA with osteopenia. She is taking vitamin d and calcium supplement. She also has a hx of hypothyroidism on LT4 50mcg. She was diagnosed postpartum at around age 34 when she was noted to have fatigue. She states compliance with medications. She denies family hx of cushings disease. She does have a family hx of DM     2/2024  -Fatigue has improved and feels it is better compared to her initial visit  -Felt her symptoms could have been secondary to the stress of holidays at that [place]  -She also notes her  underwent prostate surgery so she was helping with care  -She denies any new symptoms at this time    History/Other:    Chief Complaint Reviewed and Verified  Nursing Notes Reviewed and   Verified  Tobacco Reviewed  Allergies Reviewed  Medications Reviewed    Medical History Reviewed   Surgical History Reviewed  Family History   Reviewed  Social History Reviewed         Tobacco:  She has never smoked tobacco.    Current Outpatient Medications   Medication Sig Dispense Refill    allopurinol 300 MG Oral Tab Take 1 tablet (300 mg total) by mouth daily. 90 tablet 3    Mirabegron ER (MYRBETRIQ) 50 MG Oral Tablet 24 Hr Take 1 tablet (50 mg total) by mouth daily. 90 tablet 3    dexamethasone 1 MG Oral Tab Take 1 tablet (1 mg total) by mouth once for 1 dose. Take 1 tablet (1 mg total) by mouth once at 11 PM the night prior to your 8 AM lab draw - Oral 1 tablet 0    Omeprazole 40 MG Oral Capsule Delayed Release Take 1 capsule (40 mg total) by mouth before breakfast. 90 capsule 3    losartan 100 MG Oral Tab Take 1 tablet (100 mg total) by mouth daily. 90 tablet 3    levothyroxine 50 MCG Oral Tab Take 1 tablet (50 mcg total) by mouth before breakfast. 90 tablet 3    famotidine 20 MG Oral Tab Take 1 tablet (20 mg total) by mouth 2 (two) times daily. OK with omeprazole 180 tablet 3    traZODone 50 MG Oral Tab Take 1 tablet (50 mg total) by mouth nightly. 90 tablet 3    ESCITALOPRAM 20 MG Oral Tab TAKE ONE TABLET BY MOUTH DAILY 90 tablet 3    hydroCHLOROthiazide 12.5 MG Oral Tab Take 1 tablet (12.5 mg total) by mouth daily. 90 tablet 3    simvastatin 10 MG Oral Tab Take 1 tablet (10 mg total) by mouth nightly. 90 tablet 3    Nystatin (NYSTOP) 757040 UNIT/GM External Powder Apply 1 Application topically 2 (two) times daily as needed (rash in skin folds). 15 g 1    Cholecalciferol (VITAMIN D-3 OR) Take 1 tablet by mouth daily.      magnesium 250 MG Oral Tab Take 1 tablet (250 mg total) by mouth daily.      Psyllium 0.52 g Oral Cap Take by mouth 2 (two) times daily.      Ascorbic Acid (VITAMIN C) 500 MG Oral Cap Take 1,000 mg by mouth.      Aspirin (ASPIR-81 OR) Take  by mouth daily.      MULTI-VITAMIN OR None Entered       No Known Allergies  Past Medical History:   Diagnosis Date    Breast cancer (HCC)  09-07-12    Combined forms of age-related cataract, bilateral 4/11/2017    10/19/16 Ophthalmology consult     Plantar fasciitis 8/2/2017    S/P lumpectomy, right breast 09/12    Traumatic rupture of left posterior tibial tendon, initial encounter 12/7/2017      Past Surgical History:   Procedure Laterality Date    COLONOSCOPY  1/1/2011    Dr Stauffer    ESOPHAGOSCOPY,BIOPSY  3/1/2011    Dr Saravia    HYSTERECTOMY  2/12/13    Prolapse    LUMPECTOMY RIGHT  9/12    RADIATION RIGHT Right 09/2012     Social History     Socioeconomic History    Marital status:    Occupational History    Occupation: Retired    Tobacco Use    Smoking status: Never    Smokeless tobacco: Never   Substance and Sexual Activity    Alcohol use: No     Alcohol/week: 0.0 standard drinks of alcohol    Drug use: No    Sexual activity: Yes     Partners: Male   Other Topics Concern    Caffeine Concern No    Exercise No    Seat Belt Yes     Family History   Problem Relation Age of Onset    Heart Disease Father         Congenital heart disease    Stroke Mother     Breast Cancer Self 71         Review of Systems:  10 point ROS completed, refer to HPI for pertinent positives    Objective:   /76   Pulse 86   Ht 5' 1\" (1.549 m)   SpO2 96%   BMI 30.99 kg/m²  Estimated body mass index is 30.99 kg/m² as calculated from the following:    Height as of this encounter: 5' 1\" (1.549 m).    Weight as of 11/14/23: 164 lb (74.4 kg).  General Appearance:  Alert, in no acute distress, well developed  Eyes:  normal conjunctivae, sclera  Ears/Nose/Mouth/Throat/Neck:  normal hearing, normal speech and no palpable thyroid nodules. Prominent cervical hump    Respiratory:  breathing comfortably on room air, clear to auscultation bilaterally  Cardiovascular:  regular rate and rhythm, no murmurs, S3 or S4  Psychiatric:  Oriented to person, place and time, appropriate mood & affect  Skin: Normal moisture and skin texture  Neuro: sensory grossly intact, motor grossly  intact.        Laboratory Data     Recent Labs: Labs reviewed in [facility] under lab tab on 2/15/2024. Interpretation: elevated 8A cortisol > DST suboptimal at 2.6 > 11/2023 midnight salivary WNL and 12/2023 urine cortisol WNL   Component      Latest Ref Rng 12/11/2023   Creat 24h Ur      800 - 1800 mg/24 hr 921    Creatinine, Urine      Not Estab. mg/dL 73.7    Cortisol Free Ur      Undefined ug/L 10    Cortisol Free 24h Ur      3 - 49 ug/24 hr 13    CREAT UR CALC      0.60 - 1.80 g/24 hr 1.07    Urine Volume 24Hr      mL 1,250      Component      Latest Ref Rng 11/29/2023 11/30/2023   Salivary Cortisol MS      ug/dL 0.036  0.056      Component      Latest Ref Rng 11/17/2023   ACTH      7.2 - 63.3 pg/mL 4.3 (L)    Cortisol      ug/dL 2.6    DEXAMETHASONE, SERUM      ng/dL 345       Component      Latest Ref Rng 4/17/2023 10/19/2023 10/25/2023   CREATININE      0.55 - 1.02 mg/dL  1.15 (H)     EGFR      >=60 mL/min/1.73m2  48 (L)     TSH      0.358 - 3.740 mIU/mL 3.030      T4,Free (Direct)      0.8 - 1.7 ng/dL 0.9      T3 FREE      2.40 - 4.20 pg/mL 1.79 (L)      Cortisol      ug/dL  33.9     Potassium      3.5 - 5.1 mmol/L  3.7     Salivary Cortisol MS      ug/dL   0.017            Imaging     Radiology: Personally reviewed on 2/15/2023  I reviewed the patient's records from outside facility: osteopenia     4/2022 DXA  LUMBAR SPINE ANALYSIS RESULTS:      Bone mineral density (BMD) (g/cm2):  1.013    Lumbar T-Score:  -0.3      % young normals:  97      % age matched controls:  134      Change from prior spine examination:  1.7%               TOTAL left HIP ANALYSIS RESULTS:        Bone mineral density (BMD) (g/cm2):  0.930      Total Hip T-Score:  -0.1      % young normals:  99      % age matched controls:  134      Change from prior hip examination:  2.3%               Left FEMORAL NECK ANALYSIS RESULTS:        Bone mineral density (BMD) (g/cm2):  0.694      Femoral neck T-Score:  -1.4      % young normals:  82      %  age matched controls:  116      Change from prior hip examination:  -5.9*%     ASSESSMENT/PLAN   Assessment & Plan:     Ino Cuevas is a 83 year old female who presented to clinic for:    1. Osteopenia of neck of left femur (Primary)  -     XR DEXA BONE DENSITOMETRY (CPT=77080); Future; Expected date: 04/15/2024  2. Elevated morning serum cortisol level  Overview:  A.m. cortisol was 33 with normal being under 20.  She has a low potassium and no cortisol use.  Arranging for a nighttime salivary cortisol level and if positive it would be considered Cushing's and will need endocrinology referral.  -Clinically, patient endorses symptoms of dorsocervical fat pad and hypokalemia that has been intermittent since 2012.  Lowest potassium of 3.5.  -She denies any other symptoms of Cushing's disease at this time  -Her PCP obtained a screening 8 AM cortisol which was elevated at 33.9.  She subsequently underwent a midnight salivary cortisol level which was not elevated  -Patient denies any family history of Cushing's disease, adrenal disease, pituitary disease  -She had a bone density done in April 2022 which showed osteopenia.  She denies any history of fragility fractures or osteoporosis  -She also denies any history of diabetes, hyperlipidemia  -We discussed the pathophysiology of Cushing's disease and Cushing's syndrome  -She underwent a DST which came back sub optimal at 2.6. She then repeated midnight salivary cortisol x2 and 24 hour urine cortisol with adequate volume, which were both negative   -I discussed that based on these results no need for further testing since 2 out of 3 tests are within normal range    -Patient verbalized understanding of above and denied any further questions  -We discussed her hx of osteopenia and recommended a repeat bone density prior to follow up. Will discuss results at follow up visit       Return in about 3 months (around 5/6/2024) for review DXA.    A total of 30 minutes was  spent today on obtaining history, reviewing pertinent labs, evaluating patient, providing multiple treatment options, reinforcing diet/exercise and compliance, and completing documentation.      Heaven Jj DO, 2/15/2024

## 2024-04-17 ENCOUNTER — HOSPITAL ENCOUNTER (OUTPATIENT)
Dept: BONE DENSITY | Age: 84
Discharge: HOME | End: 2024-04-17
Attending: STUDENT IN AN ORGANIZED HEALTH CARE EDUCATION/TRAINING PROGRAM
Payer: MEDICARE

## 2024-04-17 DIAGNOSIS — M85.852 OSTEOPENIA OF NECK OF LEFT FEMUR: ICD-10-CM

## 2024-04-17 PROCEDURE — 77080 DXA BONE DENSITY AXIAL: CPT | Performed by: STUDENT IN AN ORGANIZED HEALTH CARE EDUCATION/TRAINING PROGRAM

## 2024-04-22 ENCOUNTER — TELEPHONE (OUTPATIENT)
Dept: FAMILY MEDICINE CLINIC | Facility: CLINIC | Age: 84
End: 2024-04-22

## 2024-04-22 DIAGNOSIS — E07.9 THYROID DISORDER: ICD-10-CM

## 2024-04-22 DIAGNOSIS — E78.5 DYSLIPIDEMIA: ICD-10-CM

## 2024-04-22 DIAGNOSIS — Z13.1 SCREENING FOR DIABETES MELLITUS: Primary | ICD-10-CM

## 2024-04-22 DIAGNOSIS — I10 ESSENTIAL HYPERTENSION: ICD-10-CM

## 2024-04-22 DIAGNOSIS — Z13.29 SCREENING FOR THYROID DISORDER: ICD-10-CM

## 2024-04-22 DIAGNOSIS — R73.9 HYPERGLYCEMIA: ICD-10-CM

## 2024-04-22 DIAGNOSIS — N18.31 TYPE 2 DIABETES MELLITUS WITH STAGE 3A CHRONIC KIDNEY DISEASE, WITHOUT LONG-TERM CURRENT USE OF INSULIN (HCC): ICD-10-CM

## 2024-04-22 DIAGNOSIS — N18.31 STAGE 3A CHRONIC KIDNEY DISEASE (CKD) (HCC): ICD-10-CM

## 2024-04-22 DIAGNOSIS — E11.22 TYPE 2 DIABETES MELLITUS WITH STAGE 3A CHRONIC KIDNEY DISEASE, WITHOUT LONG-TERM CURRENT USE OF INSULIN (HCC): ICD-10-CM

## 2024-04-22 DIAGNOSIS — Z13.0 SCREENING FOR IRON DEFICIENCY ANEMIA: ICD-10-CM

## 2024-04-22 DIAGNOSIS — E79.0 HYPERURICEMIA: ICD-10-CM

## 2024-04-22 DIAGNOSIS — Z13.6 SCREENING FOR CARDIOVASCULAR CONDITION: ICD-10-CM

## 2024-04-22 NOTE — TELEPHONE ENCOUNTER
1. Screening for diabetes mellitus (Primary)  -     Comp Metabolic Panel (14); Future; Expected date: 04/22/2024  2. Type 2 diabetes mellitus with stage 3a chronic kidney disease, without long-term current use of insulin (HCC)  Overview:  No Meds, Dx 12/2011 with A1c 6.6%  Orders:  -     Comp Metabolic Panel (14); Future; Expected date: 04/22/2024  -     Hemoglobin A1C; Future; Expected date: 04/22/2024  -     Microalb/Creat Ratio, Random Urine; Future; Expected date: 04/22/2024  -     Lipid Panel; Future; Expected date: 04/22/2024  3. Screening for iron deficiency anemia  -     CBC With Differential With Platelet; Future; Expected date: 04/22/2024  4. Screening for thyroid disorder  5. Screening for cardiovascular condition  -     Lipid Panel; Future; Expected date: 04/22/2024  6. Hyperglycemia  -     Comp Metabolic Panel (14); Future; Expected date: 04/22/2024  -     Hemoglobin A1C; Future; Expected date: 04/22/2024  7. Dyslipidemia  -     TSH W Reflex To Free T4; Future; Expected date: 04/22/2024  -     Lipid Panel; Future; Expected date: 04/22/2024  8. Stage 3a chronic kidney disease (CKD) (HCC)  -     CBC With Differential With Platelet; Future; Expected date: 04/22/2024  -     Comp Metabolic Panel (14); Future; Expected date: 04/22/2024  -     Microalb/Creat Ratio, Random Urine; Future; Expected date: 04/22/2024  9. Thyroid disorder  -     TSH W Reflex To Free T4; Future; Expected date: 04/22/2024  10. Essential hypertension  Overview:  Losartan 100, hctz 12.5  Orders:  -     CBC With Differential With Platelet; Future; Expected date: 04/22/2024  -     Comp Metabolic Panel (14); Future; Expected date: 04/22/2024  11. Hyperuricemia  -     CBC With Differential With Platelet; Future; Expected date: 04/22/2024  -     Comp Metabolic Panel (14); Future; Expected date: 04/22/2024  -     Uric Acid; Future; Expected date: 04/22/2024       OK to notify. Thanks, Andrew Hernandez MD

## 2024-04-22 NOTE — TELEPHONE ENCOUNTER
Please enter lab orders for the patient's upcoming physical appointment.     Physical scheduled:   Your appointments       Date & Time Appointment Department ([place])    May 16, 2024 11:00 AM CDT MA Supervisit with Oren Hernandez MD [facility] ([facility])              [facility]  [address]  [facility] [address]  [phone]           Preferred lab: [facility] ([facility])     The patient has been notified to complete fasting labs prior to their physical appointment.

## 2024-05-02 DIAGNOSIS — E78.2 MIXED HYPERLIPIDEMIA: ICD-10-CM

## 2024-05-03 RX ORDER — SIMVASTATIN 10 MG
10 TABLET ORAL NIGHTLY
Qty: 90 TABLET | Refills: 0 | Status: SHIPPED | OUTPATIENT
Start: 2024-05-03

## 2024-05-03 NOTE — TELEPHONE ENCOUNTER
Requested Prescriptions     Pending Prescriptions Disp Refills    SIMVASTATIN 10 MG Oral Tab [Pharmacy Med Name: simvastatin 10 mg tablet] 90 tablet 3     Sig: TAKE ONE TABLET BY MOUTH NIGHTLY     LOV 10/23/2023     Patient was asked to follow-up in: 6 months    Appointment scheduled: 5/16/2024 Oren Hernandez MD     Medication refilled per protocol.

## 2024-05-06 ENCOUNTER — OFFICE VISIT (OUTPATIENT)
Facility: CLINIC | Age: 84
End: 2024-05-06
Payer: MEDICARE

## 2024-05-06 VITALS — DIASTOLIC BLOOD PRESSURE: 68 MMHG | SYSTOLIC BLOOD PRESSURE: 114 MMHG | OXYGEN SATURATION: 98 % | HEART RATE: 99 BPM

## 2024-05-06 DIAGNOSIS — M85.852 OSTEOPENIA OF NECK OF LEFT FEMUR: Primary | ICD-10-CM

## 2024-05-06 DIAGNOSIS — R79.89 ELEVATED MORNING SERUM CORTISOL LEVEL: ICD-10-CM

## 2024-05-06 PROCEDURE — 99214 OFFICE O/P EST MOD 30 MIN: CPT | Performed by: STUDENT IN AN ORGANIZED HEALTH CARE EDUCATION/TRAINING PROGRAM

## 2024-05-06 NOTE — PROGRESS NOTES
ENDOCRINOLOGY, DIABETES, & METABOLISM NOTE     Subjective:   Ino Cuevas is a 83 year old female who presents for elevated cortisol.     Initial HPI 11/2023  Patient states she was in her usual state of health until around about a year ago when she noticed prominence of a dorsocervical fat pad.  She endorses a diagnosis of scoliosis for around the past 11 years and initially thought her dorsocervical fat pad was secondary to this.  She brought this up to her primary care doctor and because of her symptoms and history of intermittent hypokalemia since 2012, she underwent a 8 AM cortisol test on 10/19/2023 which came back elevated to 33.9 with a potassium of 3.7.  She then underwent a midnight salivary test which was negative (0.017).  She presents today to discuss these test results. She notes intentional weight loss of around 20 pounds since last winter and intentional weight gain of 5 pounds recently. She endorses proximal muscle weakness over the last year and slight roundness of face over the last year. She denies easy bruising, thin skin, diabetes, hyperlipidemia, osteoporosis or fractures. She underwent 4/2022 DXA with osteopenia. She is taking vitamin d and calcium supplement. She also has a hx of hypothyroidism on LT4 50mcg. She was diagnosed postpartum at around age 34 when she was noted to have fatigue. She states compliance with medications. She denies family hx of cushings disease. She does have a family hx of DM     2/2024  Fatigue has improved and feels it is better compared to her initial visit  Felt her symptoms could have been secondary to the stress of holidays at that time  She also notes her  underwent prostate surgery so she was helping with care  She denies any new symptoms at this time    5/2024 4/2024 dxa with osteopenia  On vitamin D 1000 units daily and calcium carbonate 500 mg daily  Denies any falls or fractures  Denies any kidney stones  Menopause was at age 48 , may have  been on HRT for 3 months   Does have family hx of osteoporosis (sister with OP)   2008 tripped over a box and hit the dining table and then hit a chair and broke her right humerus which healed without surgery     History/Other:    Chief Complaint Reviewed and Verified  Nursing Notes Reviewed and   Verified  Tobacco Reviewed  Allergies Reviewed  Medications Reviewed    Medical History Reviewed  Surgical History Reviewed  Family History   Reviewed  Social History Reviewed         Tobacco:  She has never smoked tobacco.    Current Outpatient Medications   Medication Sig Dispense Refill    Psyllium 0.52 g Oral Cap Take by mouth 2 (two) times daily.      simvastatin 10 MG Oral Tab TAKE ONE TABLET BY MOUTH NIGHTLY 90 tablet 0    triamcinolone 0.1 % External Ointment Apply to breast rash twice a day 30 g 1    mupirocin 2 % External Ointment Apply topically twice a day for 7 days 15 g 0    Alclometasone Dipropionate 0.05 % External Ointment Apply to nose twice daily 15 g 1    allopurinol 300 MG Oral Tab Take 1 tablet (300 mg total) by mouth daily. 90 tablet 3    Mirabegron ER (MYRBETRIQ) 50 MG Oral Tablet 24 Hr Take 1 tablet (50 mg total) by mouth daily. 90 tablet 3    dexamethasone 1 MG Oral Tab Take 1 tablet (1 mg total) by mouth once for 1 dose. Take 1 tablet (1 mg total) by mouth once at 11 PM the night prior to your 8 AM lab draw - Oral (Patient not taking: Reported on 5/6/2024) 1 tablet 0    Omeprazole 40 MG Oral Capsule Delayed Release Take 1 capsule (40 mg total) by mouth before breakfast. 90 capsule 3    losartan 100 MG Oral Tab Take 1 tablet (100 mg total) by mouth daily. 90 tablet 3    levothyroxine 50 MCG Oral Tab Take 1 tablet (50 mcg total) by mouth before breakfast. 90 tablet 3    famotidine 20 MG Oral Tab Take 1 tablet (20 mg total) by mouth 2 (two) times daily. OK with omeprazole 180 tablet 3    traZODone 50 MG Oral Tab Take 1 tablet (50 mg total) by mouth nightly. 90 tablet 3    ESCITALOPRAM 20 MG  Oral Tab TAKE ONE TABLET BY MOUTH DAILY 90 tablet 3    hydroCHLOROthiazide 12.5 MG Oral Tab Take 1 tablet (12.5 mg total) by mouth daily. 90 tablet 3    Nystatin (NYSTOP) 552991 UNIT/GM External Powder Apply 1 Application topically 2 (two) times daily as needed (rash in skin folds). 15 g 1    Cholecalciferol (VITAMIN D-3 OR) Take 1 tablet by mouth daily.      magnesium 250 MG Oral Tab Take 1 tablet (250 mg total) by mouth daily.      Ascorbic Acid (VITAMIN C) 500 MG Oral Cap Take 1,000 mg by mouth.      Aspirin (ASPIR-81 OR) Take  by mouth daily.      MULTI-VITAMIN OR None Entered       No Known Allergies  Past Medical History:    Breast cancer (HCC)    Combined forms of age-related cataract, bilateral    10/19/16 Ophthalmology consult     Plantar fasciitis    S/P lumpectomy, right breast    Traumatic rupture of left posterior tibial tendon, initial encounter      Past Surgical History:   Procedure Laterality Date    Colonoscopy  1/1/2011    Dr Stauffer    Esophagoscopy,biopsy  3/1/2011    Dr Saravia    Hysterectomy  2/12/13    Prolapse    Lumpectomy right  9/12    Radiation right Right 09/2012     Social History     Socioeconomic History    Marital status:    Occupational History    Occupation: Retired    Tobacco Use    Smoking status: Never    Smokeless tobacco: Never   Substance and Sexual Activity    Alcohol use: No     Alcohol/week: 0.0 standard drinks of alcohol    Drug use: No    Sexual activity: Yes     Partners: Male   Other Topics Concern    Caffeine Concern No    Exercise No    Seat Belt Yes     Family History   Problem Relation Age of Onset    Heart Disease Father         Congenital heart disease    Stroke Mother     Breast Cancer Self 71         Review of Systems:  10 point ROS completed, refer to HPI for pertinent positives    Objective:   /68   Pulse 99   SpO2 98%  Estimated body mass index is 30.99 kg/m² as calculated from the following:    Height as of 2/15/24: 5' 1\" (1.549 m).    Weight  as of 11/14/23: 164 lb (74.4 kg).  General Appearance:  Alert, in no acute distress, well developed  Eyes:  normal conjunctivae, sclera  Ears/Nose/Mouth/Throat/Neck:  normal hearing, normal speech and no palpable thyroid nodules. Prominent cervical hump    Respiratory:  breathing comfortably on room air, clear to auscultation bilaterally  Cardiovascular:  regular rate and rhythm, no murmurs, S3 or S4  Psychiatric:  Oriented to person, place and time, appropriate mood & affect  Skin: Normal moisture and skin texture  Neuro: sensory grossly intact, motor grossly intact.        Laboratory Data     Recent Labs: Labs reviewed in [facility] under lab tab on 5/6/2024. Interpretation: elevated 8A cortisol > DST suboptimal at 2.6 > 11/2023 midnight salivary WNL and 12/2023 urine cortisol WNL     Component      Latest Ref Rn 12/11/2023   Creat 24h Ur      800 - 1800 mg/24 hr 921    Creatinine, Urine      Not Estab. mg/dL 73.7    Cortisol Free Ur      Undefined ug/L 10    Cortisol Free 24h Ur      3 - 49 ug/24 hr 13    CREAT UR CALC      0.60 - 1.80 g/24 hr 1.07    Urine Volume 24Hr      mL 1,250      Component      Latest Ref Rn 11/29/2023 11/30/2023   Salivary Cortisol MS      ug/dL 0.036  0.056      Component      Latest Ref Rn 11/17/2023   ACTH      7.2 - 63.3 pg/mL 4.3 (L)    Cortisol      ug/dL 2.6    DEXAMETHASONE, SERUM      ng/dL 345       Component      Latest Ref Rn 4/17/2023 10/19/2023 10/25/2023   CREATININE      0.55 - 1.02 mg/dL  1.15 (H)     EGFR      >=60 mL/min/1.73m2  48 (L)     TSH      0.358 - 3.740 mIU/mL 3.030      T4,Free (Direct)      0.8 - 1.7 ng/dL 0.9      T3 FREE      2.40 - 4.20 pg/mL 1.79 (L)      Cortisol      ug/dL  33.9     Potassium      3.5 - 5.1 mmol/L  3.7     Salivary Cortisol MS      ug/dL   0.017            Imaging     Radiology: Personally reviewed on 5/6/2024  I reviewed the patient's records from outside facility: osteopenia     4/17/2024 DXA  LUMBAR SPINE ANALYSIS RESULTS:      Bone  mineral density (BMD) (g/cm2):  0.957    Lumbar T-Score:  -0.8      % young normals:  91      % age matched controls:  128      Change from prior spine examination:  -5.6*%              TOTAL HIP ANALYSIS RESULTS:        Bone mineral density (BMD) (g/cm2):  0.848      Total Hip T-Score:  -0.8      % young normals:  90      % age matched controls:  125      Change from prior hip examination:  -8.8*%              FEMORAL NECK ANALYSIS RESULTS:        Bone mineral density (BMD) (g/cm2):  0.711      Femoral neck T-Score:  -1.2      % young normals:  84      % age matched controls:  121      Change from prior hip examination:  2.4%     WRIST ANALYSIS RESULTS:      Bone mineral density (BMD) (g/cm2):        0.505    Wrist T-Score:            -1.4      % young normals:            87      % age matched controls:          124      Change from prior spine examination:  N/a            ADDITIONAL FINDINGS:  No significant additional findings.                     Impression   CONCLUSION:  1. Bone mineral density values overall compatible with the diagnosis of osteopenia by WHO definition (T-score between -1.0 and -2.5).          4/2022 DXA  LUMBAR SPINE ANALYSIS RESULTS:      Bone mineral density (BMD) (g/cm2):  1.013    Lumbar T-Score:  -0.3      % young normals:  97      % age matched controls:  134      Change from prior spine examination:  1.7%               TOTAL left HIP ANALYSIS RESULTS:        Bone mineral density (BMD) (g/cm2):  0.930      Total Hip T-Score:  -0.1      % young normals:  99      % age matched controls:  134      Change from prior hip examination:  2.3%               Left FEMORAL NECK ANALYSIS RESULTS:        Bone mineral density (BMD) (g/cm2):  0.694      Femoral neck T-Score:  -1.4      % young normals:  82      % age matched controls:  116      Change from prior hip examination:  -5.9*%     ASSESSMENT/PLAN   Assessment & Plan:     Ino Cuevas is a 83 year old female who presented to clinic  for:    1. Osteopenia of neck of left femur (Primary)  -She had a bone density done in April 2022 which showed osteopenia.  She denies any history of fragility fractures or osteoporosis  -She did fracture her right humerus after a fall where she hit both the dining table and then a chair   -She denies hx of nephrolithiasis. Menopause was early and she was briefly on HRT  -She is currently taking vitamin D 1000 units daily and calcium carbonate 500 mg daily  -We reviewed her FRAX score with the ten year probability of fracture (%) for major osteoporotic being 18% and hip fracture being 3.5%  -We reviewed available treatment options for osteoporosis, including bisphosphonates (oral vs. IV), anabolics, Evenity, and Prolia injections, as well as their indications, risks, and benefits, including black box warnings.  Discussed concerns about an increased risk of vertebral fracture after discontinuation of denosumab, the need for indefinite administration of denosumab   -She is on omeprazole but denies any hx of esophagouse or ulcer. Denies dysphagia or difficulty swallowing   -Patient to reach out to me with her final decision regarding management of osteopenia with high FRAX. We discussed both oral and IV bisphosphonate therapy    2. Elevated Morning Serum Cortisol   -Clinically, patient endorses symptoms of dorsocervical fat pad and hypokalemia that has been intermittent since 2012.  Lowest potassium of 3.5.  -She denies any other symptoms of Cushing's disease at this time  -Her PCP obtained a screening 8 AM cortisol which was elevated at 33.9.  She subsequently underwent a midnight salivary cortisol level which was not elevated  -Patient denies any family history of Cushing's disease, adrenal disease, pituitary disease  -She denies any history of diabetes, hyperlipidemia  -We discussed the pathophysiology of Cushing's disease and Cushing's syndrome  -She underwent a DST which came back sub optimal at 2.6. She then  repeated midnight salivary cortisol x2 and 24 hour urine cortisol with adequate volume, which were both negative   -I discussed that based on these results no need for further testing since 2 out of 3 tests are within normal range    -Patient verbalized understanding of above and denied any further questions           Return in about 6 months (around 11/6/2024).    A total of 30 minutes was spent today on obtaining history, reviewing pertinent labs, evaluating patient, providing multiple treatment options, reinforcing diet/exercise and compliance, and completing documentation.      Heaven Jj,   Endocrinology, Diabetes & Metabolism   5/6/2024

## 2024-05-06 NOTE — PATIENT INSTRUCTIONS
Return Visit   [ x ] Physician in 6 months   [  ] In person or video visit  [  ] In person only    [ x ] After visit summary      It was great seeing you today!    Today we discussed your osteopenia:  -We reviewed your fracture risk  -We reviewed risks and benefits of both therapy and no therapy  -We discussed starting Alendronate (fosamax) vs. IV Reclast (given once a year for at least 2-3 years before we do drug holiday). I will await your final decision     Take care!  -Dr. Jj

## 2024-05-09 ENCOUNTER — LAB ENCOUNTER (OUTPATIENT)
Dept: LAB | Age: 84
End: 2024-05-09
Attending: FAMILY MEDICINE
Payer: MEDICARE

## 2024-05-09 DIAGNOSIS — R73.9 HYPERGLYCEMIA: ICD-10-CM

## 2024-05-09 DIAGNOSIS — N18.31 TYPE 2 DIABETES MELLITUS WITH STAGE 3A CHRONIC KIDNEY DISEASE, WITHOUT LONG-TERM CURRENT USE OF INSULIN (HCC): ICD-10-CM

## 2024-05-09 DIAGNOSIS — E78.5 DYSLIPIDEMIA: ICD-10-CM

## 2024-05-09 DIAGNOSIS — Z13.6 SCREENING FOR CARDIOVASCULAR CONDITION: ICD-10-CM

## 2024-05-09 DIAGNOSIS — I10 ESSENTIAL HYPERTENSION: ICD-10-CM

## 2024-05-09 DIAGNOSIS — E79.0 HYPERURICEMIA: ICD-10-CM

## 2024-05-09 DIAGNOSIS — Z13.0 SCREENING FOR IRON DEFICIENCY ANEMIA: ICD-10-CM

## 2024-05-09 DIAGNOSIS — E07.9 THYROID DISORDER: ICD-10-CM

## 2024-05-09 DIAGNOSIS — N18.31 STAGE 3A CHRONIC KIDNEY DISEASE (CKD) (HCC): ICD-10-CM

## 2024-05-09 DIAGNOSIS — Z13.1 SCREENING FOR DIABETES MELLITUS: ICD-10-CM

## 2024-05-09 DIAGNOSIS — E11.22 TYPE 2 DIABETES MELLITUS WITH STAGE 3A CHRONIC KIDNEY DISEASE, WITHOUT LONG-TERM CURRENT USE OF INSULIN (HCC): ICD-10-CM

## 2024-05-09 LAB
ALBUMIN SERPL-MCNC: 3.8 G/DL (ref 3.4–5)
ALBUMIN/GLOB SERPL: 1.3 {RATIO} (ref 1–2)
ALP LIVER SERPL-CCNC: 77 U/L
ALT SERPL-CCNC: 15 U/L
ANION GAP SERPL CALC-SCNC: 8 MMOL/L (ref 0–18)
AST SERPL-CCNC: 19 U/L (ref 15–37)
BASOPHILS # BLD AUTO: 0.02 X10(3) UL (ref 0–0.2)
BASOPHILS NFR BLD AUTO: 0.4 %
BILIRUB SERPL-MCNC: 0.6 MG/DL (ref 0.1–2)
BUN BLD-MCNC: 17 MG/DL (ref 9–23)
CALCIUM BLD-MCNC: 9.2 MG/DL (ref 8.5–10.1)
CHLORIDE SERPL-SCNC: 107 MMOL/L (ref 98–112)
CHOLEST SERPL-MCNC: 134 MG/DL (ref ?–200)
CO2 SERPL-SCNC: 26 MMOL/L (ref 21–32)
CREAT BLD-MCNC: 1.19 MG/DL
CREAT UR-SCNC: 118 MG/DL
EGFRCR SERPLBLD CKD-EPI 2021: 46 ML/MIN/1.73M2 (ref 60–?)
EOSINOPHIL # BLD AUTO: 0.16 X10(3) UL (ref 0–0.7)
EOSINOPHIL NFR BLD AUTO: 2.8 %
ERYTHROCYTE [DISTWIDTH] IN BLOOD BY AUTOMATED COUNT: 13.6 %
EST. AVERAGE GLUCOSE BLD GHB EST-MCNC: 126 MG/DL (ref 68–126)
FASTING PATIENT LIPID ANSWER: YES
FASTING STATUS PATIENT QL REPORTED: YES
GLOBULIN PLAS-MCNC: 2.9 G/DL (ref 2.8–4.4)
GLUCOSE BLD-MCNC: 85 MG/DL (ref 70–99)
HBA1C MFR BLD: 6 % (ref ?–5.7)
HCT VFR BLD AUTO: 37.9 %
HDLC SERPL-MCNC: 62 MG/DL (ref 40–59)
HGB BLD-MCNC: 12.3 G/DL
IMM GRANULOCYTES # BLD AUTO: 0.03 X10(3) UL (ref 0–1)
IMM GRANULOCYTES NFR BLD: 0.5 %
LDLC SERPL CALC-MCNC: 58 MG/DL (ref ?–100)
LYMPHOCYTES # BLD AUTO: 1.3 X10(3) UL (ref 1–4)
LYMPHOCYTES NFR BLD AUTO: 22.9 %
MCH RBC QN AUTO: 31.3 PG (ref 26–34)
MCHC RBC AUTO-ENTMCNC: 32.5 G/DL (ref 31–37)
MCV RBC AUTO: 96.4 FL
MICROALBUMIN UR-MCNC: 0.73 MG/DL
MICROALBUMIN/CREAT 24H UR-RTO: 6.2 UG/MG (ref ?–30)
MONOCYTES # BLD AUTO: 0.46 X10(3) UL (ref 0.1–1)
MONOCYTES NFR BLD AUTO: 8.1 %
NEUTROPHILS # BLD AUTO: 3.7 X10 (3) UL (ref 1.5–7.7)
NEUTROPHILS # BLD AUTO: 3.7 X10(3) UL (ref 1.5–7.7)
NEUTROPHILS NFR BLD AUTO: 65.3 %
NONHDLC SERPL-MCNC: 72 MG/DL (ref ?–130)
OSMOLALITY SERPL CALC.SUM OF ELEC: 293 MOSM/KG (ref 275–295)
PLATELET # BLD AUTO: 175 10(3)UL (ref 150–450)
POTASSIUM SERPL-SCNC: 3.5 MMOL/L (ref 3.5–5.1)
PROT SERPL-MCNC: 6.7 G/DL (ref 6.4–8.2)
RBC # BLD AUTO: 3.93 X10(6)UL
SODIUM SERPL-SCNC: 141 MMOL/L (ref 136–145)
TRIGL SERPL-MCNC: 70 MG/DL (ref 30–149)
TSI SER-ACNC: 2.48 MIU/ML (ref 0.36–3.74)
URATE SERPL-MCNC: 2.9 MG/DL
VLDLC SERPL CALC-MCNC: 10 MG/DL (ref 0–30)
WBC # BLD AUTO: 5.7 X10(3) UL (ref 4–11)

## 2024-05-09 PROCEDURE — 83036 HEMOGLOBIN GLYCOSYLATED A1C: CPT

## 2024-05-09 PROCEDURE — 36415 COLL VENOUS BLD VENIPUNCTURE: CPT

## 2024-05-09 PROCEDURE — 85025 COMPLETE CBC W/AUTO DIFF WBC: CPT

## 2024-05-09 PROCEDURE — 82570 ASSAY OF URINE CREATININE: CPT

## 2024-05-09 PROCEDURE — 82043 UR ALBUMIN QUANTITATIVE: CPT

## 2024-05-09 PROCEDURE — 84443 ASSAY THYROID STIM HORMONE: CPT

## 2024-05-09 PROCEDURE — 80053 COMPREHEN METABOLIC PANEL: CPT

## 2024-05-09 PROCEDURE — 80061 LIPID PANEL: CPT

## 2024-05-09 PROCEDURE — 84550 ASSAY OF BLOOD/URIC ACID: CPT

## 2024-05-16 ENCOUNTER — OFFICE VISIT (OUTPATIENT)
Dept: FAMILY MEDICINE CLINIC | Facility: CLINIC | Age: 84
End: 2024-05-16

## 2024-05-16 VITALS
DIASTOLIC BLOOD PRESSURE: 80 MMHG | BODY MASS INDEX: 31.23 KG/M2 | HEART RATE: 78 BPM | SYSTOLIC BLOOD PRESSURE: 130 MMHG | HEIGHT: 61 IN | RESPIRATION RATE: 14 BRPM | WEIGHT: 165.38 LBS

## 2024-05-16 DIAGNOSIS — M17.0 PRIMARY OSTEOARTHRITIS OF BOTH KNEES: ICD-10-CM

## 2024-05-16 DIAGNOSIS — F33.42 RECURRENT MAJOR DEPRESSIVE DISORDER, IN FULL REMISSION (HCC): Chronic | ICD-10-CM

## 2024-05-16 DIAGNOSIS — M1A.0720 CHRONIC IDIOPATHIC GOUT INVOLVING TOE OF LEFT FOOT WITHOUT TOPHUS: Chronic | ICD-10-CM

## 2024-05-16 DIAGNOSIS — I10 ESSENTIAL HYPERTENSION: Chronic | ICD-10-CM

## 2024-05-16 DIAGNOSIS — K76.0 FATTY INFILTRATION OF LIVER: ICD-10-CM

## 2024-05-16 DIAGNOSIS — R79.89 ELEVATED MORNING SERUM CORTISOL LEVEL: ICD-10-CM

## 2024-05-16 DIAGNOSIS — K21.9 GASTROESOPHAGEAL REFLUX DISEASE WITHOUT ESOPHAGITIS: ICD-10-CM

## 2024-05-16 DIAGNOSIS — N99.3 PELVIC RELAXATION DUE TO VAGINAL VAULT PROLAPSE, POSTHYSTERECTOMY: ICD-10-CM

## 2024-05-16 DIAGNOSIS — E03.9 ACQUIRED HYPOTHYROIDISM: Chronic | ICD-10-CM

## 2024-05-16 DIAGNOSIS — D69.6 THROMBOCYTOPENIA (HCC): ICD-10-CM

## 2024-05-16 DIAGNOSIS — E78.2 MIXED HYPERLIPIDEMIA: ICD-10-CM

## 2024-05-16 DIAGNOSIS — Z00.00 ANNUAL PHYSICAL EXAM: Primary | ICD-10-CM

## 2024-05-16 DIAGNOSIS — R45.7 CAREGIVER STRESS SYNDROME: ICD-10-CM

## 2024-05-16 DIAGNOSIS — E11.22 TYPE 2 DIABETES MELLITUS WITH STAGE 3A CHRONIC KIDNEY DISEASE, WITHOUT LONG-TERM CURRENT USE OF INSULIN (HCC): ICD-10-CM

## 2024-05-16 DIAGNOSIS — N32.81 OVERACTIVE BLADDER: ICD-10-CM

## 2024-05-16 DIAGNOSIS — E11.22 CKD STAGE 3 SECONDARY TO DIABETES (HCC): ICD-10-CM

## 2024-05-16 DIAGNOSIS — Z00.00 ENCOUNTER FOR ANNUAL HEALTH EXAMINATION: ICD-10-CM

## 2024-05-16 DIAGNOSIS — I77.9 BILATERAL CAROTID ARTERY DISEASE, UNSPECIFIED TYPE (HCC): ICD-10-CM

## 2024-05-16 DIAGNOSIS — N18.31 TYPE 2 DIABETES MELLITUS WITH STAGE 3A CHRONIC KIDNEY DISEASE, WITHOUT LONG-TERM CURRENT USE OF INSULIN (HCC): ICD-10-CM

## 2024-05-16 DIAGNOSIS — N18.30 CKD STAGE 3 SECONDARY TO DIABETES (HCC): ICD-10-CM

## 2024-05-16 DIAGNOSIS — F32.5 MAJOR DEPRESSION IN COMPLETE REMISSION (HCC): Chronic | ICD-10-CM

## 2024-05-16 DIAGNOSIS — Z85.3 PERSONAL HISTORY OF BREAST CANCER: ICD-10-CM

## 2024-05-16 PROCEDURE — 99214 OFFICE O/P EST MOD 30 MIN: CPT | Performed by: FAMILY MEDICINE

## 2024-05-16 PROCEDURE — 96160 PT-FOCUSED HLTH RISK ASSMT: CPT | Performed by: FAMILY MEDICINE

## 2024-05-16 PROCEDURE — 99499 UNLISTED E&M SERVICE: CPT | Performed by: FAMILY MEDICINE

## 2024-05-16 PROCEDURE — G0439 PPPS, SUBSEQ VISIT: HCPCS | Performed by: FAMILY MEDICINE

## 2024-05-16 RX ORDER — ESCITALOPRAM OXALATE 20 MG/1
20 TABLET ORAL DAILY
Qty: 90 TABLET | Refills: 3 | Status: SHIPPED | OUTPATIENT
Start: 2024-05-16

## 2024-05-16 RX ORDER — OMEPRAZOLE 40 MG/1
40 CAPSULE, DELAYED RELEASE ORAL
Qty: 90 CAPSULE | Refills: 3 | Status: CANCELLED | OUTPATIENT
Start: 2024-05-16

## 2024-05-16 RX ORDER — HYDROCHLOROTHIAZIDE 12.5 MG/1
12.5 TABLET ORAL DAILY
Qty: 90 TABLET | Refills: 3 | Status: SHIPPED | OUTPATIENT
Start: 2024-05-16

## 2024-05-16 NOTE — ASSESSMENT & PLAN NOTE
BP shows good control with last BP of 114/68. Continue lifestyle changes, diet, exercise and weight loss.   Last K was 3.5 done on 5/9/2024.  Last Cr was 1.19 done on 5/9/2024.  Last eGFR was 46 on 5/9/2024.  BP Meds: hydroCHLOROthiazide Tabs - 12.5 MG; losartan Tabs - 100 MG

## 2024-05-16 NOTE — PROGRESS NOTES
Subjective:   Ino Cuevas is a 83 year old female who presents for a MA (Medicare Advantage) Supervisit (Once per calendar year) and scheduled follow up of multiple significant but stable problems.   MAWV subsewqunt, OA ofn both knees. Dexa is worsening but she has had fosamaox annd she is leary about option.   She is under cargiver stress with anmoland, he is anxious about  a lot of things but stubborn enough to want to do everything around the home.    History/Other:   Fall Risk Assessment:   She has been screened for Falls and is low risk.      Cognitive Assessment:   She had a completely normal cognitive assessment - see flowsheet entries     Functional Ability/Status:   Ino Cuevas has a completely normal functional assessment. See flowsheet for details.      Depression Screening (PHQ-2/PHQ-9): PHQ-2 SCORE: 0  , done 5/16/2024   Trouble falling or staying asleep, or sleeping too much: 1     Feeling tired or having little energy: 1    If you checked off any problems, how difficult have these problems made it for you to do your work, take care of things at home, or get along with other people?: Not difficult at all           Advanced Directives:   She does have a Living Will but we do NOT have it on file in Epic.    She has a Power of  for Health Care on file in [facility].  Discussed Advance Care Planning with patient (and family/surrogate if present). Standard forms made available to patient in After Visit Summary.      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    Current Medications:  Outpatient Medications Marked as Taking for the 5/16/24 encounter (Office Visit) with Oren Hernandez MD   Medication Sig    hydroCHLOROthiazide 12.5 MG Oral Tab Take 1 tablet (12.5 mg total) by mouth daily.    escitalopram 20 MG Oral Tab Take 1 tablet (20 mg total) by mouth daily.    simvastatin 10 MG Oral Tab TAKE ONE TABLET BY MOUTH NIGHTLY    triamcinolone 0.1 % External Ointment Apply to breast rash twice a day    mupirocin 2 % External Ointment Apply topically twice a day for 7 days    Alclometasone Dipropionate 0.05 % External Ointment Apply to nose twice daily    allopurinol 300 MG Oral Tab Take 1 tablet (300 mg total) by mouth daily.    Mirabegron ER (MYRBETRIQ) 50 MG Oral Tablet 24 Hr Take 1 tablet (50 mg total) by mouth daily.    dexamethasone 1 MG Oral Tab Take 1 tablet (1 mg total) by mouth once for 1 dose. Take 1 tablet (1 mg total) by mouth once at 11 PM the night prior to your 8 AM lab draw - Oral    Omeprazole 40 MG Oral Capsule Delayed Release Take 1 capsule (40 mg total) by mouth before breakfast.    losartan 100 MG Oral Tab Take 1 tablet (100 mg total) by mouth daily.    levothyroxine 50 MCG Oral Tab Take 1 tablet (50 mcg total) by mouth before breakfast.    famotidine 20 MG Oral Tab Take 1 tablet (20 mg total) by mouth 2 (two) times daily. OK with omeprazole    traZODone 50 MG Oral Tab Take 1 tablet (50 mg total) by mouth nightly.    Nystatin (NYSTOP) 404632 UNIT/GM External Powder Apply 1 Application topically 2 (two) times daily as needed (rash in skin folds).    Cholecalciferol (VITAMIN D-3 OR) Take 1 tablet by mouth daily.    magnesium 250 MG Oral Tab Take 1 tablet (250 mg total) by mouth daily.    Psyllium 0.52 g Oral Cap Take by mouth 2 (two) times daily.    Ascorbic Acid  (VITAMIN C) 500 MG Oral Cap Take 1,000 mg by mouth.    Aspirin (ASPIR-81 OR) Take  by mouth daily.    MULTI-VITAMIN OR None Entered       Medical History:  She  has a past medical history of Breast cancer (HCC) (09-07-12), Combined forms of age-related cataract, bilateral (4/11/2017), Plantar fasciitis (8/2/2017), S/P lumpectomy, right breast (09/12), and Traumatic rupture of left posterior tibial tendon, initial encounter (12/7/2017).  Surgical History:  She  has a past surgical history that includes esophagoscopy,biopsy (3/1/2011); lumpectomy right (9/12); hysterectomy (2/12/13); colonoscopy (1/1/2011); and radiation right (Right, 09/2012).   Family History:  Her family history includes Breast Cancer (age of onset: 69) in her self; Heart Disease in her father; Stroke in her mother.  Social History:  She  reports that she has never smoked. She has never used smokeless tobacco. She reports that she does not drink alcohol and does not use drugs.    Tobacco:  She has never smoked tobacco.    CAGE Alcohol Screen:   CAGE screening score of 0 on 5/16/2024, showing low risk of alcohol abuse.      Patient Care Team:  Oren Hernandez MD as PCP - General (Family Practice)  Rabia Gillespie as Consulting Physician (ONCOLOGY)  Jennifer Blevins MD as Consulting Physician (OBSTETRICS & GYNECOLOGY)  Beatris Mccormack (Radiation Oncology)  Sang Esqueda MD (OPHTHALMOLOGY)  Rand Serrato (UROLOGY)  Christine Quispe (SURGERY, GENERAL)  Toña Deng MD as Consulting Physician (OPHTHALMOLOGY)  Marques Natarajan MD as Consulting Physician (GASTROENTEROLOGY)    Review of Systems   Constitutional: Negative.  Negative for activity change, appetite change, chills and fever.   HENT: Negative.     Eyes: Negative.    Respiratory: Negative.  Negative for shortness of breath.    Cardiovascular: Negative.  Negative for chest pain and palpitations.   Gastrointestinal: Negative.  Negative for abdominal pain.   Genitourinary: Negative.  Negative  for dysuria.   Musculoskeletal:  Negative for arthralgias.   Skin: Negative.  Negative for rash.   Allergic/Immunologic: Negative.    Neurological: Negative.        Objective:   Physical Exam  Vitals and nursing note reviewed.   Constitutional:       General: She is not in acute distress.     Appearance: Normal appearance. She is well-developed.   HENT:      Head: Normocephalic and atraumatic.      Right Ear: Tympanic membrane and external ear normal.      Left Ear: Tympanic membrane and external ear normal.      Nose: Nose normal.      Mouth/Throat:      Mouth: Mucous membranes are moist.   Eyes:      Extraocular Movements: Extraocular movements intact.      Pupils: Pupils are equal, round, and reactive to light.   Cardiovascular:      Rate and Rhythm: Normal rate and regular rhythm.      Pulses: Normal pulses.           Carotid pulses are 2+ on the right side and 2+ on the left side.       Radial pulses are 2+ on the right side and 2+ on the left side.        Dorsalis pedis pulses are 2+ on the right side and 2+ on the left side.        Posterior tibial pulses are 2+ on the right side and 2+ on the left side.      Heart sounds: Normal heart sounds, S1 normal and S2 normal. No murmur heard.  Pulmonary:      Effort: Pulmonary effort is normal. No respiratory distress.      Breath sounds: Normal breath sounds.   Abdominal:      General: Abdomen is flat. Bowel sounds are normal. There is no distension.      Palpations: Abdomen is soft.   Musculoskeletal:         General: Normal range of motion.      Cervical back: Normal range of motion and neck supple.      Right lower leg: No edema.      Left lower leg: No edema.   Skin:     General: Skin is warm and dry.      Capillary Refill: Capillary refill takes less than 2 seconds.      Findings: No rash.   Neurological:      General: No focal deficit present.      Mental Status: She is alert and oriented to person, place, and time.   Psychiatric:         Mood and Affect: Mood  normal.         Behavior: Behavior normal.         Thought Content: Thought content normal.         Judgment: Judgment normal.         /80   Pulse 78   Resp 14   Ht 5' 1\" (1.549 m)   Wt 165 lb 6.4 oz (75 kg)   BMI 31.25 kg/m²  Estimated body mass index is 31.25 kg/m² as calculated from the following:    Height as of this encounter: 5' 1\" (1.549 m).    Weight as of this encounter: 165 lb 6.4 oz (75 kg).    Medicare Hearing Assessment:   Hearing Screening    Screening Method: Whisper Test  Whisper Test Result: Pass         Visual Acuity:   Right Eye Visual Acuity: Corrected Right Eye Chart Acuity: 20/100   Left Eye Visual Acuity: Corrected Left Eye Chart Acuity: 20/40   Both Eyes Visual Acuity: Corrected Both Eyes Chart Acuity: 20/30   Able To Tolerate Visual Acuity: Yes        Assessment & Plan:   Ino Cuevas is a 83 year old female who presents for a Medicare Assessment.     1. Annual physical exam (Primary)  2. CKD stage 3 secondary to diabetes ([facility])  Overview:  GFR 53, diet controlled Diabetes, losartan -12.5  Assessment & Plan:  5/9/2024: eGFR-Cr 46 (L); Creatinine 1.19 (H) stable, continue present management and continue to monitor for progression   Orders:  -     [facility] Referral to Chronic Care Management (CCM)  -     Detailed, Mod Complex (12254)  3. Type 2 diabetes mellitus with stage 3a chronic kidney disease, without long-term current use of insulin ([facility])  Overview:  No Meds, Dx 12/2011 with A1c 6.6%  Assessment & Plan:  A1c is 6 done 5/9/2024 which shows Excellent control. Continue current meds and lifestyle modification.  Not currently on Diabetic meds. Stable, continue present management and continue to monitor for progression   Orders:  -     [facility] Referral to Chronic Care Management (CCM)  -     Detailed, Mod Complex (71177)  4. Recurrent major depressive disorder, in full remission ([facility])  Overview:  Trazodone 50, esictalopram 20  Assessment & Plan:  Clinical Course: stable   Good  control  Antidepressant Meds: escitalopram Tabs - 20 MG; traZODone Tabs - 50 MG   Orders:  -     Detailed, Mod Complex (96006)  5. Thrombocytopenia (HCC)  Overview:   since 2016, unclear source  Assessment & Plan:  Last Platelet value was 175 done 5/9/2024. Lowest level in the last 10 years was 125. Stable, continue present management and continue to monitor for progression   Orders:  -     Detailed, Mod Complex (08272)  6. Bilateral carotid artery disease, unspecified type (HCC)  Overview:  4/4//19 ultrasound, on simvastatin 10  Assessment & Plan:  Stable, continue present management and continue to monitor for progression   7. Mixed hyperlipidemia  Overview:  Simvastatin 10  Assessment & Plan:  Cholesterol shows Good control. Long term heart-healthy diet and lifestyle discussed and encouraged to reduce risk of cardiovascular disease.  Cholesterol: 134, done on 5/9/2024.  HDL Cholesterol: 62, done on 5/9/2024.  TriGlycerides 70, done on 5/9/2024.  LDL Cholesterol: 58, done on 5/9/2024.   Cholesterol medications include simvastatin 10 MG Oral Tab [[id number]], simvastatin 10 MG Oral Tab [[id number]] (Long-Term Med).     Orders:  -     Detailed, Mod Complex (59055)  8. Acquired hypothyroidism  Overview:  Levothyroxine 75  Assessment & Plan:  Thyroid shows Good control.   TSH: 2.48, done on 5/9/2024.  FT4: 0.9, done on 4/17/2023.  FT3: 1.79, done on 4/17/2023.   Thyroid therapy includes levothyroxine 50 MCG Oral Tab [[id number]], levothyroxine 50 MCG Oral Tab [[id number]] (Long-Term Med).   Orders:  -     Detailed, Mod Complex (06666)  9. Elevated morning serum cortisol level  Overview:  A.m. cortisol was 33 with normal being under 20.  She has a low potassium and no cortisol use.  Arranging for a nighttime salivary cortisol level and if positive it would be considered Cushing's and will need endocrinology referral.  Assessment & Plan:  Stable, continue present management and continue to monitor for progression     10. Fatty infiltration of liver  Overview:  3/21/15 US ABD and repeat 1/2020  Assessment & Plan:  Stable, continue present management and continue to monitor for progression 5/9/2024: ALT 15; AST 19   Orders:  -     Detailed, Mod Complex (18172)  11. Gastroesophageal reflux disease without esophagitis  Overview:  Pantoprazole 40 prn, failed H2 blocker but added to PPI 5/16/2019   Assessment & Plan:  Stable, continue present management and continue to monitor for progression   12. Chronic idiopathic gout involving toe of left foot without tophus  Overview:  Allopurinol 300  Assessment & Plan:  Gout shows Good control .  Uric Acid: 2.9, done on 5/9/2024.  Last Cr was 1.19 done on 5/9/2024.  Last eGFR was 46 on 5/9/2024.  Gout Meds: allopurinol Tabs - 300 MG    13. Overactive bladder  Overview:  Myrtbetric 50 daily, urology is Dr Marrero at   Assessment & Plan:  Stable, continue present management and continue to monitor for progression   14. 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 S/p Arimidex x5 years until 2018  Dx Sept 7, 2012  0.7 cm, T1b, N0 WLE @ [place], metaplastic, 10% ER+, Sept 2012 Breast RT, Nov 2012 Arimidex 1 mg PO daily  Assessment & Plan:  Stable, continue present management and continue to monitor for progression   16. Essential hypertension  -     hydroCHLOROthiazide; Take 1 tablet (12.5 mg total) by mouth daily.  Dispense: 90 tablet; Refill: 3  17. Major depression in complete remission (HCC)  Overview:  Trazodone 50, esictalopram 20  Assessment & Plan:  Clinical Course: stable    Good control  Antidepressant Meds: escitalopram Tabs - 20 MG; traZODone Tabs - 50 MG   Orders:  -     Escitalopram Oxalate; Take 1 tablet (20 mg total) by mouth daily.  Dispense: 90 tablet; Refill: 3  18. Primary osteoarthritis of both knees  -     ORTHOPEDIC - INTERNAL  19. Caregiver stress syndrome  -     [facility] Referral to Chronic Care Management (CCM)  20. Encounter for annual health examination    The patient indicates understanding of these issues and agrees to the plan.  Reinforced healthy diet, lifestyle, and exercise.      Return in 6 months (on 11/16/2024).     Oren Hernandez MD, 5/16/2024     Supplementary Documentation:   General Health:  In the past six months, have you lost more than 10 pounds without trying?: 2 - No  Has your appetite been poor?: No  Type of Diet: Balanced  How does the patient maintain a good energy level?: Daily Walks  How would you describe your daily physical activity?: Light  How would you describe your current health state?: Good  How do you maintain positive mental well-being?: Social Interaction  On a scale of 0 to 10, with 0 being no pain and 10 being severe pain, what is your pain level?: 0 - (None)  In the past six months, have you experienced urine leakage?: 0-No  At any time do you feel concerned for the safety/well-being of yourself and/or your children, in your home or elsewhere?: No  Have you had any immunizations at another office such as Influenza, Hepatitis B, Tetanus, or Pneumococcal?: No       Ino Cuevas's SCREENING SCHEDULE   Tests on this list are recommended by your physician but may not be covered, or covered at this frequency, by your insurer.   Please check with your insurance carrier before scheduling to verify coverage.   PREVENTATIVE SERVICES FREQUENCY &  COVERAGE DETAILS LAST COMPLETION DATE   Diabetes Screening    Fasting Blood Sugar /  Glucose    One screening every 12 months if never tested or if previously tested but not diagnosed with  pre-diabetes   One screening every 6 months if diagnosed with pre-diabetes Lab Results   Component Value Date    GLU 85 05/09/2024        Cardiovascular Disease Screening    Lipid Panel  Cholesterol  Lipoprotein (HDL)  Triglycerides Covered every 5 years for all Medicare beneficiaries without apparent signs or symptoms of cardiovascular disease Lab Results   Component Value Date    CHOLEST 134 05/09/2024    HDL 62 (H) 05/09/2024    LDL 58 05/09/2024    TRIG 70 05/09/2024         Electrocardiogram (EKG)   Covered if needed at Welcome to Medicare, and non-screening if indicated for medical reasons 03/18/2015      Ultrasound Screening for Abdominal Aortic Aneurysm (AAA) Covered once in a lifetime for one of the following risk factors    Men who are 65-75 years old and have ever smoked    Anyone with a family history -     Colorectal Cancer Screening  Covered for ages 50-85; only need ONE of the following:    Colonoscopy   Covered every 10 years    Covered every 2 years if patient is at high risk or previous colonoscopy was abnormal 07/25/2018    No recommendations at this time    Flexible Sigmoidoscopy   Covered every 4 years -    Fecal Occult Blood Test Covered annually -   Bone Density Screening    Bone density screening    Covered every 2 years after age 65 if diagnosed with risk of osteoporosis or estrogen deficiency.    Covered yearly for long-term glucocorticoid medication use (Steroids) Last Dexa Scan:    XR DEXA BONE DENSITOMETRY (CPT=77080) 04/17/2024      No recommendations at this time   Pap and Pelvic    Pap   Covered every 2 years for women at normal risk; Annually if at high risk -  No recommendations at this time    Chlamydia Annually if high risk -  No recommendations at this time   Screening Mammogram    Mammogram     Recommend annually for all female patients aged 40 and older    One baseline mammogram covered for patients aged 35-39 -    No recommendations at this time    Immunizations    Influenza  Covered once per flu season  Please get every year -  No recommendations at this time    Pneumococcal Each vaccine ([id number] & [id number]) covered once after 65 Prevnar 13: 03/09/2016    [id number]: 11/02/2009     No recommendations at this time    Hepatitis B One screening covered for patients with certain risk factors   -  No recommendations at this time    Tetanus Toxoid Not covered by Medicare Part B unless medically necessary (cut with metal); may be covered with your pharmacy prescription benefits 01/01/2010    Tetanus, Diptheria and Pertusis TD and TDaP Not covered by Medicare Part B -  No recommendations at this time    Zoster Not covered by Medicare Part B; may be covered with your pharmacy  prescription benefits -  Zoster Vaccines(1 of 2) Never done     Diabetes      Hemoglobin A1C Annually; if last result is elevated, may be asked to retest more frequently.    Medicare covers every 3 months Lab Results   Component Value Date     05/09/2024    A1C 6.0 (H) 05/09/2024       No recommendations at this time    Creat/alb ratio Annually Lab Results   Component Value Date    MICROALBCREA 6.2 05/09/2024    MALBCRECALC 30.2 (H) 05/22/2014       LDL Annually Lab Results   Component Value Date    LDL 58 05/09/2024       Dilated Eye Exam Annually Last Diabetic Eye Exam:  Last Dilated Eye Exam: 08/14/23  Eye Exam shows Diabetic Retinopathy?: No       Annual Monitoring of Persistent Medications (ACE/ARB, digoxin diuretics, anticonvulsants)    Potassium Annually Lab Results   Component Value Date    K 3.5 05/09/2024         Creatinine   Annually Lab Results   Component Value Date    CREATSERUM 1.19 (H) 05/09/2024         BUN Annually Lab Results   Component Value Date    BUN 17 05/09/2024       Drug Serum Conc Annually No results found for: \"DIGOXIN\", \"DIG\", \"VALP\"

## 2024-05-16 NOTE — ASSESSMENT & PLAN NOTE
Gout shows Good control .  Uric Acid: 2.9, done on 5/9/2024.  Last Cr was 1.19 done on 5/9/2024.  Last eGFR was 46 on 5/9/2024.  Gout Meds: allopurinol Tabs - 300 MG

## 2024-05-16 NOTE — ASSESSMENT & PLAN NOTE
A1c is 6 done 5/9/2024 which shows Excellent control. Continue current meds and lifestyle modification.  Not currently on Diabetic meds. Stable, continue present management and continue to monitor for progression

## 2024-05-16 NOTE — ASSESSMENT & PLAN NOTE
Thyroid shows Good control.   TSH: 2.48, done on 5/9/2024.  FT4: 0.9, done on 4/17/2023.  FT3: 1.79, done on 4/17/2023.   Thyroid therapy includes levothyroxine 50 MCG Oral Tab [[id number]], levothyroxine 50 MCG Oral Tab [[id number]] (Long-Term Med).

## 2024-05-16 NOTE — PATIENT INSTRUCTIONS
Ino Cuevas's SCREENING SCHEDULE   Tests on this list are recommended by your physician but may not be covered, or covered at this frequency, by your insurer.   Please check with your insurance carrier before scheduling to verify coverage.   PREVENTATIVE SERVICES FREQUENCY &  COVERAGE DETAILS LAST COMPLETION DATE   Diabetes Screening    Fasting Blood Sugar /  Glucose    One screening every 12 months if never tested or if previously tested but not diagnosed with pre-diabetes   One screening every 6 months if diagnosed with pre-diabetes Lab Results   Component Value Date    GLU 85 05/09/2024        Cardiovascular Disease Screening    Lipid Panel  Cholesterol  Lipoprotein (HDL)  Triglycerides Covered every 5 years for all Medicare beneficiaries without apparent signs or symptoms of cardiovascular disease Lab Results   Component Value Date    CHOLEST 134 05/09/2024    HDL 62 (H) 05/09/2024    LDL 58 05/09/2024    TRIG 70 05/09/2024         Electrocardiogram (EKG)   Covered if needed at Welcome to Medicare, and non-screening if indicated for medical reasons 03/18/2015      Ultrasound Screening for Abdominal Aortic Aneurysm (AAA) Covered once in a lifetime for one of the following risk factors   • Men who are 65-75 years old and have ever smoked   • Anyone with a family history -     Colorectal Cancer Screening  Covered for ages 50-85; only need ONE of the following:    Colonoscopy   Covered every 10 years    Covered every 2 years if patient is at high risk or previous colonoscopy was abnormal 07/25/2018    No recommendations at this time    Flexible Sigmoidoscopy   Covered every 4 years -    Fecal Occult Blood Test Covered annually -   Bone Density Screening    Bone density screening    Covered every 2 years after age 65 if diagnosed with risk of osteoporosis or estrogen deficiency.    Covered yearly for long-term glucocorticoid medication use (Steroids) Last Dexa Scan:    XR DEXA BONE DENSITOMETRY (CPT=77080)  04/17/2024      No recommendations at this time   Pap and Pelvic    Pap   Covered every 2 years for women at normal risk; Annually if at high risk -  No recommendations at this time    Chlamydia Annually if high risk -  No recommendations at this time   Screening Mammogram    Mammogram     Recommend annually for all female patients aged 40 and older    One baseline mammogram covered for patients aged 35-39 -    No recommendations at this time    Immunizations    Influenza Covered once per flu season  Please get every year -  No recommendations at this time    Pneumococcal Each vaccine (Pxxmrfg45 & Nisivxuhs67) covered once after 65 Prevnar 13: 03/09/2016    Rxmhlxiux13: 11/02/2009     No recommendations at this time    Hepatitis B One screening covered for patients with certain risk factors   -  No recommendations at this time    Tetanus Toxoid Not covered by Medicare Part B unless medically necessary (cut with metal); may be covered with your pharmacy prescription benefits 01/01/2010    Tetanus, Diptheria and Pertusis TD and TDaP Not covered by Medicare Part B -  No recommendations at this time    Zoster Not covered by Medicare Part B; may be covered with your pharmacy  prescription benefits -  Zoster Vaccines(1 of 2) Never done     Diabetes      Hemoglobin A1C Annually; if last result is elevated, may be asked to retest more frequently.    Medicare covers every 3 months Lab Results   Component Value Date     05/09/2024    A1C 6.0 (H) 05/09/2024       No recommendations at this time    Creat/alb ratio Annually Lab Results   Component Value Date    MICROALBCREA 6.2 05/09/2024    MALBCRECALC 30.2 (H) 05/22/2014       LDL Annually Lab Results   Component Value Date    LDL 58 05/09/2024       Dilated Eye Exam Annually [unfilled]     Annual Monitoring of Persistent Medications (ACE/ARB, digoxin diuretics, anticonvulsants)    Potassium Annually Lab Results   Component Value Date    K 3.5 05/09/2024          Creatinine   Annually Lab Results   Component Value Date    CREATSERUM 1.19 (H) 05/09/2024         BUN Annually Lab Results   Component Value Date    BUN 17 05/09/2024       Drug Serum Conc Annually No results found for: \"DIGOXIN\", \"DIG\", \"VALP\"

## 2024-05-16 NOTE — ASSESSMENT & PLAN NOTE
5/9/2024: eGFR-Cr 46 (L); Creatinine 1.19 (H) stable, continue present management and continue to monitor for progression

## 2024-05-16 NOTE — ASSESSMENT & PLAN NOTE
Last Platelet value was 175 done 5/9/2024. Lowest level in the last 10 years was 125. Stable, continue present management and continue to monitor for progression

## 2024-05-16 NOTE — ASSESSMENT & PLAN NOTE
Stable, continue present management and continue to monitor for progression 5/9/2024: ALT 15; AST 19

## 2024-05-16 NOTE — ASSESSMENT & PLAN NOTE
Cholesterol shows Good control. Long term heart-healthy diet and lifestyle discussed and encouraged to reduce risk of cardiovascular disease.  Cholesterol: 134, done on 5/9/2024.  HDL Cholesterol: 62, done on 5/9/2024.  TriGlycerides 70, done on 5/9/2024.  LDL Cholesterol: 58, done on 5/9/2024.   Cholesterol medications include simvastatin 10 MG Oral Tab [[id number]], simvastatin 10 MG Oral Tab [[id number]] (Long-Term Med).

## 2024-07-02 ENCOUNTER — PATIENT OUTREACH (OUTPATIENT)
Dept: CASE MANAGEMENT | Age: 84
End: 2024-07-02

## 2024-07-03 NOTE — PROGRESS NOTES
Patient identified with a potential need for Chronic Care Management services.  Called patient to introduce self and availability of Chronic Care Management services.  Patient informed about the following service elements:  Health information sharing - complying with all laws related to privacy and security of their health information  Patient/Insurance Cost sharing - includes deductible and any ongoing copays and/or coinsurance  Only one provider can bill for this service monthly  Patient right to discontinue services at any time for any reason effective last day of current month  Patient verbally accepts to participate in Chronic Care Management services and any associated charges.

## 2024-07-05 ENCOUNTER — PATIENT OUTREACH (OUTPATIENT)
Dept: CASE MANAGEMENT | Age: 84
End: 2024-07-05

## 2024-07-05 DIAGNOSIS — N18.31 TYPE 2 DIABETES MELLITUS WITH STAGE 3A CHRONIC KIDNEY DISEASE, WITHOUT LONG-TERM CURRENT USE OF INSULIN (HCC): ICD-10-CM

## 2024-07-05 DIAGNOSIS — K21.9 GASTROESOPHAGEAL REFLUX DISEASE WITHOUT ESOPHAGITIS: ICD-10-CM

## 2024-07-05 DIAGNOSIS — E03.9 ACQUIRED HYPOTHYROIDISM: Primary | ICD-10-CM

## 2024-07-05 DIAGNOSIS — E78.2 MIXED HYPERLIPIDEMIA: ICD-10-CM

## 2024-07-05 DIAGNOSIS — E11.22 TYPE 2 DIABETES MELLITUS WITH STAGE 3A CHRONIC KIDNEY DISEASE, WITHOUT LONG-TERM CURRENT USE OF INSULIN (HCC): ICD-10-CM

## 2024-07-05 NOTE — PROGRESS NOTES
Spoke with patient for CCM     Assessment:    I want to know a little about you.  What do you do for fun/hobbies? She likes to read. Goes to lunch with friends. Goes to [employer].   Are you retired? If not, what do you do for work? Patient is retired.     Social support:  Do you have someone you can turn to when you are very stressed and or need help? Yes, her sister and friend.   Do you live with someone? Yes, with her spouse Chuy.   Do you have someone you check in with or talk to on a regular basis? Yes. Her children call on a regular basis, her sister and her friend.   Are you the primary caretaker for anyone? Yes,  Chuy.  How are you doing with that? Ok  Do you feel you take time for yourself too? Yes, somewhat   Do you have any pets at home?  No pets.   tell me about your pets.  Lets talk about stress.  How much stress do you feel you have in your life? Lots of stress.   Is it manageable? Yes.   What are some of the causes of stress?   Family - husbands current health issues.   Work  Financial  Etc   How do you handle this stress? Talks with her friend. Takes Lexapro.  What would help relieve some of this stress? No.     Care Team:  Updated specialists and added them to care team:   Kit Trevino.    Medication review:  Do you take them every day on time? Yes  Do you feel you can afford your medications? Yes    Nutrition: Lets talk about eating habits  Do you eat three small meals a day? Eats 1 big meal and 2 smaller meals  Breakfast is very important as it sets the tone for the day  Do you feel you need to work on your eating habits? No  What would you like to change? Nothing.  Do you eat a variety of fruits and veggies? Yes but doesn't really care for them.   Any special diet you are put on?  (cardiac, Dm diet, etc) No.  Do you have any barriers to keeping with this diet? Patient has acid reflux and has to watch what she eats for dinner.   Can you tell me why you think you were put on this  diet? N/A  Would you like more info. No.  What are concerns or things that keep you from following this diet? Nothing.  Do you want more information or resources to help “shake up” your usual meals? No     Exercise:  Do you exercise? No - has knee pain - arthritis.  What do you do?   How often?   How long?   If not, are you up and moving? Yes, patient is up and moving. She does chores around the house.  Do you want to work on incorporating more movement or exercise into your daily routine? No    Disease specific:   Do you feel you have a good understanding of your diagnoses? Patient has a good understanding of her diagnoses.   Would you like more info on anything? No.  DM:  if on insulin are they rotating sites?   Ask where they rotate to.  N/A    Goals:  What would you say is your biggest concerns about your health? Her knee pain is her biggest concern.   What do you think is a barrier or something that may be stopping you from doing this? Her husbands health condition and his doctors appointments.   How can I help you achieve your goals? Continue to motivate patient to increase physical activity and manage her glucose levels.Continue to provide encouragement, support, and education.    Personal:  Are you active on [employer]? Yes  Do you have a preference in day/time to reach out to you monthly?     Next month I will follow up and we will discuss your health, health habits and goals to set for moving forward.     Follow up:  You can follow up with me any time if you have any questions. I will follow up this call with a [employer] message and/or letter with my direct contact information for you to call if you should need anything before we talk next.     Care manager f/up: 1 Month     Interventions for following categories based on assessment.  Encouraged patient:   Self care: Take the time to do the things you love.   Nutrition:  Good nutrition helps us to maintain our weight, fight off infection, and help reduce the  risk of developing other chronic issues.   Physical activity:  Physical activity is important to help maintain independence and improve quality of life.     Advised patient to drink plenty of water in order stay hydrated during hot weather due to high temperatures could cause blood sugar levels to fluctuate especially during physical activity.       Health Maintenance: Reviewed   Health Maintenance   Topic Date Due    Zoster Vaccines (1 of 2) Never done    COVID-19 Vaccine (3 - 2023-24 season) 09/01/2023    Diabetes Care Dilated Eye Exam  08/14/2024    Influenza Vaccine (1) 10/01/2024    Diabetes Care Foot Exam  10/23/2024    Diabetes Care A1C  11/09/2024    Diabetes Care: GFR  05/09/2025    Diabetes Care: Microalb/Creat Ratio  05/09/2025    DEXA Scan  04/17/2026    MA Annual Health Assessment  Completed    Annual Depression Screening  Completed    Fall Risk Screening (Annual)  Completed    Pneumococcal Vaccine: 65+ Years  Completed     Meds: Detailed medication review with Ino. Discussed with Ino if any potential barriers are present that could prevent compliance with medication regimen. At this time, no barriers reported. Ino reports is stable on all medications and denies experiencing any side effects.    Your appointments       Date & Time Appointment Department ([place])    Sep 10, 2024 10:30 AM CDT Established Patient with Claude Siddiqui MD [facility] ([facility])        Nov 12, 2024 11:30 AM CST Follow Up Visit with Heaven Jj DO [facility] ([facility])        Nov 20, 2024 11:00 AM CST Exam - Established with Oren Hernandez MD [facility] ([facility])              [facility]  [address]  [facility] [address]  [phone] [facility]  Group, [facility]  [address]  [facility] [address]  [phone] [facility]  [facility]  [address]  [facility] [address]  [phone]            Time Spent This Encounter Total: 23 min medical record review, telephone communication, care plan updates where needed, and education. Provided acknowledgment and validation to patient's concerns.   Monthly Minute Total including today: 23 min    Physical assessment, complete health history, and need for CCM established by Oren Hernandez MD.

## 2024-07-26 ENCOUNTER — TELEPHONE (OUTPATIENT)
Facility: CLINIC | Age: 84
End: 2024-07-26

## 2024-07-26 DIAGNOSIS — M25.562 PAIN IN BOTH KNEES, UNSPECIFIED CHRONICITY: Primary | ICD-10-CM

## 2024-07-26 DIAGNOSIS — M25.561 PAIN IN BOTH KNEES, UNSPECIFIED CHRONICITY: Primary | ICD-10-CM

## 2024-07-26 NOTE — TELEPHONE ENCOUNTER
Xr's ordered for upcoming appointment      No mention of knee replacements in snapshot     My chart message sent to patient asking her to arrive earlier to complete images     Xr scheduled

## 2024-08-06 DIAGNOSIS — E78.2 MIXED HYPERLIPIDEMIA: ICD-10-CM

## 2024-08-06 RX ORDER — SIMVASTATIN 10 MG
10 TABLET ORAL NIGHTLY
Qty: 90 TABLET | Refills: 0 | Status: SHIPPED | OUTPATIENT
Start: 2024-08-06

## 2024-08-06 NOTE — TELEPHONE ENCOUNTER
Requested Prescriptions     Pending Prescriptions Disp Refills    SIMVASTATIN 10 MG Oral Tab [Pharmacy Med Name: simvastatin 10 mg tablet] 90 tablet 0     Sig: TAKE ONE TABLET BY MOUTH NIGHTLY     LOV 5/16/2024     Patient was asked to follow-up in: 6 months    Appointment scheduled: 11/20/2024 Oren Hernandez MD     Medication refilled per protocol.

## 2024-08-19 ENCOUNTER — TELEPHONE (OUTPATIENT)
Dept: FAMILY MEDICINE CLINIC | Facility: CLINIC | Age: 84
End: 2024-08-19

## 2024-08-19 NOTE — TELEPHONE ENCOUNTER
Pt reports  tested positive for Covid on Thursday 8/15.   She took a Covid test on Saturday and Sunday and she tested negative both times.  Last saw  on Thursday.     Temp 100.4 this morning - took Tylenol. C/o HA, fatigue, low energy all weekend. Has a \"tickle\" cough. Denies SOB, congestion. Sx mild overall.     Routed to Dr. Hernandez - is there anything additional you recommend given known exposure?

## 2024-08-19 NOTE — TELEPHONE ENCOUNTER
Patient  test Covid Positive last thursdays at [facility].    Patient take a Covid test at [facility] and test negative.    Patient have symptoms Headache, cough, not energy and fever of 100.4    Request a nurse to call back for what to do.

## 2024-08-20 NOTE — TELEPHONE ENCOUNTER
Called and talked to patient and went over reasoning to not give paxlovid she will treat symptoms with OTC meds and if not better will call the office back.

## 2024-08-23 ENCOUNTER — PATIENT OUTREACH (OUTPATIENT)
Dept: CASE MANAGEMENT | Age: 84
End: 2024-08-23

## 2024-08-23 DIAGNOSIS — E03.9 ACQUIRED HYPOTHYROIDISM: ICD-10-CM

## 2024-08-23 DIAGNOSIS — D69.6 THROMBOCYTOPENIA (HCC): ICD-10-CM

## 2024-08-23 DIAGNOSIS — E78.2 MIXED HYPERLIPIDEMIA: ICD-10-CM

## 2024-08-23 DIAGNOSIS — N18.30 CKD STAGE 3 SECONDARY TO DIABETES (HCC): ICD-10-CM

## 2024-08-23 DIAGNOSIS — F33.42 RECURRENT MAJOR DEPRESSIVE DISORDER, IN FULL REMISSION (HCC): ICD-10-CM

## 2024-08-23 DIAGNOSIS — E11.22 CKD STAGE 3 SECONDARY TO DIABETES (HCC): ICD-10-CM

## 2024-08-23 DIAGNOSIS — E11.22 TYPE 2 DIABETES MELLITUS WITH STAGE 3A CHRONIC KIDNEY DISEASE, WITHOUT LONG-TERM CURRENT USE OF INSULIN (HCC): Primary | ICD-10-CM

## 2024-08-23 DIAGNOSIS — N18.31 TYPE 2 DIABETES MELLITUS WITH STAGE 3A CHRONIC KIDNEY DISEASE, WITHOUT LONG-TERM CURRENT USE OF INSULIN (HCC): Primary | ICD-10-CM

## 2024-08-23 RX ORDER — TRAZODONE HYDROCHLORIDE 50 MG/1
50 TABLET, FILM COATED ORAL NIGHTLY
Qty: 90 TABLET | Refills: 1 | Status: SHIPPED | OUTPATIENT
Start: 2024-08-23

## 2024-08-23 NOTE — PROGRESS NOTES
Spoke to Ino for CCM -  monthly call     Updates to patient care team/ comments: Up-to-date     Patient reported changes in medications: None  Med Adherence  Comment: Taking as directed    Health Maintenance:  Reviewed with patient.  Health Maintenance   Topic Date Due    Zoster Vaccines (1 of 2) Never done    COVID-19 Vaccine (3 - 2023-24 season) 09/01/2023    Diabetes Care Dilated Eye Exam  08/14/2024    Influenza Vaccine (1) 10/01/2024    Diabetes Care Foot Exam  10/23/2024    Diabetes Care A1C  11/09/2024    Diabetes Care: GFR  05/09/2025    Diabetes Care: Microalb/Creat Ratio  05/09/2025    DEXA Scan  04/17/2026    MA Annual Health Assessment  Completed    Annual Depression Screening  Completed    Fall Risk Screening (Annual)  Completed    Pneumococcal Vaccine: 65+ Years  Completed       Patient current concerns:   The patients  tested COVID positive on 8/15/24 and shortly after, the patient also began feeling unwell. She has been drinking fluids and taking Tylenol as needed. Her symptoms were mild and she is now feeling better.     The patient was informed that Dr. Edmonds has ordered x-rays for her to complete before her next appointment. However, the patient stated that she had to cancel the appointment and has not yet rescheduled it because she has been busy searching for an assisted living facility for her . She also mentioned that since her  has been in rehab, she hasn't needed to go up and down the stairs in her house as much and as a result, her knee is not bothering at the moment.     The patient does not check her glucose level at home but she is taking her medications as prescribed.     Goals/Action Plan:    Active goal from previous outreach: The patient wants to stay active.     Patient reported progress towards goals: The patient continues to work towards goals.  What: The patient was informed that Dr. Edmonds has ordered x-rays for her to complete before her next  appointment. However, the patient stated that she had to cancel the appointment and has not yet rescheduled it because she has been busy searching for an assisted living facility for her . S  Where/When/How: She also mentioned that since her  has been in rehab, she hasn't needed to go up and down the stairs in her house as much and as a result, her knee is not bothering at the moment.     Patient Reported Barriers and Concerns: The patient reports that she is busy due to her 's need for an assisted living facility is keeping her too busy to take care of her own needs.   Plan for overcoming barriers: She will address her own health issues once her  is situated.       Care managers interventions:    These interventions aim to enhance patient knowledge, improve their ability to manage their health, and ultimately lead to better health outcomes.     Preventive Health Education: The patient was encouraged to adhere to health screenings, vaccinations, tests, and scheduled appointments.   Diabetes: Discussed importance of proper pedal hygiene, regular foot checks, and tight glucose control.   Staying hydrated: Advised patient to drink plenty of water in order stay hydrated during hot weather due to high temperatures could cause blood sugar levels to fluctuate especially during physical activity.   Physical Activity: Encouraged the patient to stay active to improve physical and mental well-being.  Nutrition:Ensuring the patient is following a balanced diet that meets nutritional needs.      Appointments reviewed with patient.   Future Appointments   Date Time Provider Department Center   9/10/2024 10:30 AM Claude Siddiqui MD [facility]   11/12/2024 11:30 AM Heaven Jj DO [id number] [place]   11/20/2024 11:00 AM Oren Hernandez MD [facility] [place] Care Manager Follow Up Date: 1 Month     Reason For Follow Up: review progress and or barriers towards patient's  goals.     Time Spent This Encounter Total:  21 minutes medical record review, telephone communication, care plan updates where needed, education, goals, and action plan recreation/update. Provided acknowledgment and validation to patient's concerns.   Monthly Minute Total including today: 21 minutes  Physical assessment, complete health history, and need for CCM established by Oren Hernandez MD.

## 2024-08-23 NOTE — PROGRESS NOTES
Refill request sent to Dr Hernandez for Trazodone 50mg     Also asking if her Omeprazole can be filled early?    Total time -  5 min  Total Monthly time-  26 min

## 2024-08-23 NOTE — TELEPHONE ENCOUNTER
Future Appointments   Date Time Provider Department Center   9/10/2024 10:30 AM Claude Siddiqui MD [facility]   11/12/2024 11:30 AM Heaven Jj DO [id number] [facility] [place]     11/20/2024   11:00 AM  6 month f/u   Oren Hernandez MD       [facility] 3   [facility] spoke with the patient this afternoon and she indicated needing a refill on Trazodone 50 mg    Trazodone 50 mg  was last refilled on 8/23/23 #90+3 refills    Follow up appointment : 11/20/24  Last office visit: 5/16/24 - Medicare physical     Medication is pended for approval or denial    The patient is also wanting to  her Omeprazole early but it needs to be authorized by provider  Ok to call [place] and authorize early dispensing?    Omeprazole as last refilled 10/23/23 #90+3 refills

## 2024-10-10 ENCOUNTER — HOSPITAL ENCOUNTER (OUTPATIENT)
Age: 84
Discharge: HOME | End: 2024-10-10
Payer: MEDICARE

## 2024-10-10 ENCOUNTER — APPOINTMENT (OUTPATIENT)
Dept: GENERAL RADIOLOGY | Age: 84
End: 2024-10-10
Attending: NURSE PRACTITIONER
Payer: MEDICARE

## 2024-10-10 VITALS
RESPIRATION RATE: 18 BRPM | SYSTOLIC BLOOD PRESSURE: 134 MMHG | TEMPERATURE: 99 F | BODY MASS INDEX: 28.71 KG/M2 | OXYGEN SATURATION: 97 % | HEIGHT: 62 IN | WEIGHT: 156 LBS | HEART RATE: 95 BPM | DIASTOLIC BLOOD PRESSURE: 75 MMHG

## 2024-10-10 DIAGNOSIS — J04.0 ACUTE LARYNGITIS: Primary | ICD-10-CM

## 2024-10-10 LAB
POCT INFLUENZA A: NEGATIVE
POCT INFLUENZA B: NEGATIVE
S PYO AG THROAT QL IA.RAPID: NEGATIVE
SARS-COV-2 RNA RESP QL NAA+PROBE: NOT DETECTED

## 2024-10-10 PROCEDURE — 99214 OFFICE O/P EST MOD 30 MIN: CPT

## 2024-10-10 PROCEDURE — 87502 INFLUENZA DNA AMP PROBE: CPT | Performed by: NURSE PRACTITIONER

## 2024-10-10 PROCEDURE — 99203 OFFICE O/P NEW LOW 30 MIN: CPT

## 2024-10-10 PROCEDURE — 87651 STREP A DNA AMP PROBE: CPT | Performed by: NURSE PRACTITIONER

## 2024-10-10 PROCEDURE — 71046 X-RAY EXAM CHEST 2 VIEWS: CPT | Performed by: NURSE PRACTITIONER

## 2024-10-10 NOTE — ED PROVIDER NOTES
Patient Seen in: [facility]    History     Chief Complaint   Patient presents with    Sore Throat    Laryngitis     Stated Complaint: sore throat,lost voice    HPI    Ino Cuevas is a 83 year old female who presents to immediate care with laryngitis and sore throat that started Sunday.   Reports the symptoms started worsening yesterday.   Pain scale 5 0/10.  Patient denies alleviating or exacerbating factors.  Patient denies fever, chills, abdominal pain, nausea, vomiting, diarrhea, constipation, dysuria, hematuria, flank pain, rash, neck pain, neck swelling, restricted neck movement, dyspnea, wheeze, hemoptysis.        Past Medical History:    Breast cancer (HCC)    Combined forms of age-related cataract, bilateral    10/19/16 Ophthalmology consult     Plantar fasciitis    S/P lumpectomy, right breast    Traumatic rupture of left posterior tibial tendon, initial encounter       Past Surgical History:   Procedure Laterality Date    Colonoscopy  1/1/2011    Dr Stauffer    Esophagoscopy,biopsy  3/1/2011    Dr Saravia    Hysterectomy  2/12/13    Prolapse    Lumpectomy right  9/12    Radiation right Right 09/2012            Family History   Problem Relation Age of Onset    Heart Disease Father         Congenital heart disease    Stroke Mother     Breast Cancer Self 71       Social History     Socioeconomic History    Marital status:    Occupational History    Occupation: Retired    Tobacco Use    Smoking status: Never    Smokeless tobacco: Never   Vaping Use    Vaping status: Never Used   Substance and Sexual Activity    Alcohol use: No     Alcohol/week: 0.0 standard drinks of alcohol    Drug use: No    Sexual activity: Yes     Partners: Male   Other Topics Concern    Caffeine Concern No    Exercise No    Seat Belt Yes     Social Drivers of Health     Financial Resource Strain: Low Risk  (7/5/2024)    Financial Resource Strain     Difficulty of Paying Living Expenses: Not hard at all     Med  Affordability: No   Food Insecurity: No Food Insecurity (7/5/2024)    Food Insecurity     Food Insecurity: Never true   Transportation Needs: No Transportation Needs (7/5/2024)    Transportation Needs     Lack of Transportation: No   Stress: No Stress Concern Present (7/5/2024)    Stress     Feeling of Stress : No   Social Connections: Socially Integrated (7/5/2024)    Social Connections     Frequency of Socialization with Friends and Family: 3   Housing Stability: Low Risk  (7/5/2024)    Housing Stability     Housing Instability: No       Review of Systems    Positive for stated complaint: sore throat,lost voice  Other systems are as noted in HPI.  Constitutional and vital signs reviewed.      All other systems reviewed and negative except as noted above.    PSFH elements reviewed from today and agreed except as otherwise stated in HPI.    Physical Exam     ED Triage Vitals [10/10/24 1424]   /75   Pulse 95   Resp 18   Temp 98.7 °F (37.1 °C)   Temp src Oral   SpO2 97 %   O2 Device None (Room air)       Current:/75   Pulse 95   Temp 98.7 °F (37.1 °C) (Oral)   Resp 18   Ht 157.5 cm (5' 2\")   Wt 70.8 kg   SpO2 97%   BMI 28.53 kg/m²     PULSE OX 97% on RA     Constitutional: The patient is cooperative. Appears well-developed and well-nourished.  No acute distress.  Psychological: Alert, No abnormalities of mood, affect.  Head: Normocephalic/atraumatic.  Nontender.  Eyes: Pupils are equal round reactive to light.  Conjunctiva are within normal limits.  ENT: Pharynx noninjected.  Tonsils within normal size limits bilaterally.  No tonsillar exudates.  TMs within normal limits bilaterally.  Mucous membranes moist.  Neck: The neck is supple.  There is no evidence of JVD.  No meningeal signs.  Chest: There is no tenderness to the chest wall.  No CVA tenderness bilaterally.  Respiratory: Respiratory effort was normal.  There is no rales, wheezes, or rhonchi.  There is no stridor.  Air entry is  equal.  Cardiovascular: Regular rate and rhythm, no murmurs, gallops, rubs.  Capillary refill is brisk.  No extremity edema.  Gastrointestinal: Abdomen soft, nontender, nondistended.  There is no rebound tenderness or guarding.  No organomegaly is noted. No peritoneal signs.  Genitourinary: Not examined.  Lymphatic: No gross lymphadenopathy noted.  Musculoskeletal: Musculoskeletal system is grossly intact.  There is no obvious deformity.  Neurological: Gross motor movement is intact in all 4 extremities.  Patient exhibits normal speech.  Skin: Skin is normal to inspection.  Warm and dry.  No obvious bruising.  No obvious rash.    ED Course     Labs Reviewed   RAPID STREP A - Normal   POCT FLU TEST - Normal    Narrative:     This assay is a rapid molecular in vitro test utilizing nucleic acid amplification of influenza A and B viral RNA.   RAPID SARS-COV-2 BY PCR - Normal     I have personally  reviewed available prior medical records for any recent pertinent discharge summaries/testing. Patient/family updated on results and plan, a verbalized understanding and agreement with the plan.  I explained to the patient that emergent conditions may arise and to go to the ER for new, worsening or any persistent conditions. I've explained the importance of taking all medicatons as prescribed, follow up, and return precuations,  All questions answered.    Please note that this report has been produced using speech recognition software and may contain errors related to that system including, but not limited to, errors in grammar, punctuation, and spelling, as well as words and phrases that possibly may have been recognized inappropriately.  If there are any questions or concerns, contact the dictating provider for clarification.  MDM     Physical exam remained stable over serial reexaminations as previously documented.  Results reviewed with patient.    Patient presents with cough, congestion, sore throat since Sunday.  Nontoxic, does not meet SIRS criteria.   Patient does not have uvula deviation or unilateral tonsillar swelling to indicate tonsillar abscess. No meningsmus or trismus. No dysphagia or difficulty handing secretions. No evidence for otitis media. Patient does not have any respiratory distress.  O2 saturation within normal limits for this patient. Does not appear clinically dehydrated and is tolerating oral intake. Presentation consistent with laryngitis.  COVID, flu, strep are all negative at this time.. Encouraged patient on oral hydration and supportive care. Recommend follow up with PCP.  Return to ED precautions discussed with the patient and family.    Disposition and Plan     Clinical Impression:  1. Acute laryngitis        Disposition:  Discharge    Follow-up:  Oren Hernandez MD  [address] [place] [id number] [phone]          We recommend that you schedule follow up care with a primary care provider within the next three months to obtain basic health screening including reassessment of your blood pressure.    Medications Prescribed:  Discharge Medication List as of 10/10/2024  3:57 PM

## 2024-10-15 ENCOUNTER — PATIENT OUTREACH (OUTPATIENT)
Dept: CASE MANAGEMENT | Age: 84
End: 2024-10-15

## 2024-10-15 ENCOUNTER — TELEPHONE (OUTPATIENT)
Dept: FAMILY MEDICINE CLINIC | Facility: CLINIC | Age: 84
End: 2024-10-15

## 2024-10-15 DIAGNOSIS — E11.22 CKD STAGE 3 SECONDARY TO DIABETES (HCC): Primary | ICD-10-CM

## 2024-10-15 DIAGNOSIS — E79.0 HYPERURICEMIA: ICD-10-CM

## 2024-10-15 DIAGNOSIS — E78.2 MIXED HYPERLIPIDEMIA: ICD-10-CM

## 2024-10-15 DIAGNOSIS — N18.31 TYPE 2 DIABETES MELLITUS WITH STAGE 3A CHRONIC KIDNEY DISEASE, WITHOUT LONG-TERM CURRENT USE OF INSULIN (HCC): ICD-10-CM

## 2024-10-15 DIAGNOSIS — N18.31 TYPE 2 DIABETES MELLITUS WITH STAGE 3A CHRONIC KIDNEY DISEASE, WITHOUT LONG-TERM CURRENT USE OF INSULIN (HCC): Primary | ICD-10-CM

## 2024-10-15 DIAGNOSIS — R73.9 HYPERGLYCEMIA: ICD-10-CM

## 2024-10-15 DIAGNOSIS — N18.31 STAGE 3A CHRONIC KIDNEY DISEASE (CKD) (HCC): ICD-10-CM

## 2024-10-15 DIAGNOSIS — E07.9 THYROID DISORDER: ICD-10-CM

## 2024-10-15 DIAGNOSIS — E03.9 ACQUIRED HYPOTHYROIDISM: ICD-10-CM

## 2024-10-15 DIAGNOSIS — E11.22 CKD STAGE 3 SECONDARY TO DIABETES (HCC): ICD-10-CM

## 2024-10-15 DIAGNOSIS — M1A.0720 CHRONIC IDIOPATHIC GOUT INVOLVING TOE OF LEFT FOOT WITHOUT TOPHUS: Chronic | ICD-10-CM

## 2024-10-15 DIAGNOSIS — E11.22 TYPE 2 DIABETES MELLITUS WITH STAGE 3A CHRONIC KIDNEY DISEASE, WITHOUT LONG-TERM CURRENT USE OF INSULIN (HCC): Primary | ICD-10-CM

## 2024-10-15 DIAGNOSIS — E78.5 DYSLIPIDEMIA: ICD-10-CM

## 2024-10-15 DIAGNOSIS — Z13.0 SCREENING FOR IRON DEFICIENCY ANEMIA: ICD-10-CM

## 2024-10-15 DIAGNOSIS — E11.22 TYPE 2 DIABETES MELLITUS WITH STAGE 3A CHRONIC KIDNEY DISEASE, WITHOUT LONG-TERM CURRENT USE OF INSULIN (HCC): ICD-10-CM

## 2024-10-15 DIAGNOSIS — Z13.6 SCREENING FOR CARDIOVASCULAR CONDITION: ICD-10-CM

## 2024-10-15 DIAGNOSIS — N18.30 CKD STAGE 3 SECONDARY TO DIABETES (HCC): Primary | ICD-10-CM

## 2024-10-15 DIAGNOSIS — E03.9 ACQUIRED HYPOTHYROIDISM: Chronic | ICD-10-CM

## 2024-10-15 DIAGNOSIS — I10 ESSENTIAL HYPERTENSION: ICD-10-CM

## 2024-10-15 DIAGNOSIS — Z13.1 SCREENING FOR DIABETES MELLITUS: ICD-10-CM

## 2024-10-15 DIAGNOSIS — N18.30 CKD STAGE 3 SECONDARY TO DIABETES (HCC): ICD-10-CM

## 2024-10-15 DIAGNOSIS — I12.9 HYPERTENSIVE KIDNEY DISEASE: Chronic | ICD-10-CM

## 2024-10-15 NOTE — TELEPHONE ENCOUNTER
1. CKD stage 3 secondary to diabetes (HCC) (Primary)  Overview:  GFR 53, diet controlled Diabetes, losartan -12.5  2. Type 2 diabetes mellitus with stage 3a chronic kidney disease, without long-term current use of insulin (HCC)  Overview:  No Meds, Dx 12/2011 with A1c 6.6%  Orders:  -     Hemoglobin A1C; Future; Expected date: 10/15/2024  -     Lipid Panel; Future; Expected date: 10/15/2024  -     Comp Metabolic Panel (14); Future; Expected date: 10/15/2024  3. Hyperglycemia  -     Hemoglobin A1C; Future; Expected date: 10/15/2024  -     Comp Metabolic Panel (14); Future; Expected date: 10/15/2024  4. Screening for cardiovascular condition  -     Lipid Panel; Future; Expected date: 10/15/2024  5. Dyslipidemia  -     Lipid Panel; Future; Expected date: 10/15/2024  6. Hyperuricemia  -     Uric Acid; Future; Expected date: 10/15/2024  -     CBC With Differential With Platelet; Future; Expected date: 10/15/2024  -     Comp Metabolic Panel (14); Future; Expected date: 10/15/2024  7. Screening for iron deficiency anemia  8. Stage 3a chronic kidney disease (CKD) (HCC)  -     CBC With Differential With Platelet; Future; Expected date: 10/15/2024  -     Comp Metabolic Panel (14); Future; Expected date: 10/15/2024  9. Essential hypertension  -     CBC With Differential With Platelet; Future; Expected date: 10/15/2024  -     Comp Metabolic Panel (14); Future; Expected date: 10/15/2024  10. Screening for diabetes mellitus  11. Thyroid disorder  12. Mixed hyperlipidemia  Overview:  Simvastatin 10  13. Chronic idiopathic gout involving toe of left foot without tophus  Overview:  Allopurinol 300  14. Hypertensive kidney disease  Overview:  HTN with CKD 3. Losartan 100, hctz 12.5       OK to notify. Thanks, Andrew Hernandez MD

## 2024-10-15 NOTE — PROGRESS NOTES
Spoke to Ino for [facility] -  monthly call     Updates to patient care team/ comments: Up-to-date    Patient reported changes in medications: None  Med Adherence  Comment: Taking as directed    Health Maintenance:  Reviewed with patient.  Health Maintenance   Topic Date Due    Zoster Vaccines (1 of 2) Never done    Diabetes Care Dilated Eye Exam  08/14/2024    COVID-19 Vaccine (3 - 2023-24 season) 09/01/2024    Influenza Vaccine (1) 10/01/2024    Diabetes Care Foot Exam  10/23/2024    Diabetes Care A1C  11/09/2024    Diabetes Care: GFR  05/09/2025    Diabetes Care: Microalb/Creat Ratio  05/09/2025    DEXA Scan  04/17/2026    MA Annual Health Assessment  Completed    Annual Depression Screening  Completed    Fall Risk Screening (Annual)  Completed    Pneumococcal Vaccine: 65+ Years  Completed       Patient current concerns:   CCM spoke with the patient today. Dalia reports doing well despite the recent passing of her  a few weeks ago. She states that her mood is stable. While she feels sad, she is at peace with his passing, as he had suffered quite a bit. Her children , who all live [facility], have returned to their homes. She reports that she is now able to sleep more easily, now that things have settled down.  The patient reports she is now able to focus on her own healthcare, as she was previously preoccupied with her husbands declining health before his passing. On 10/10/24 the patient visited urgent care, complaining of a sore throat ands laryngitis. She was tested for COVID, strep and flu, all of which were negative. She reports feeling better now.     She states that her diet consists of small portions, and her neighbors continue to bring foods over. She reports a weight loss of 15 pounds. She reports that she does not check her blood pressure or blood glucose levels at home. She has an upcoming appointment with Dr. Hernandez on 11/20/24 and is requesting that Dr. Hernandez put in labs orders for that appointment. She  is also asking [facility] to cancel any appointments her  had scheduled. CCM with assist with this.        Goals/Action Plan:    Active goal from previous outreach:     Patient reported progress towards goals: The patient continues to work towards goals.  What: [facility] spoke with the patient today. Dalia reports doing well despite the recent passing of her  a few weeks ago. She states that her mood is stable. While she feels sad, she is at peace with his passing, as he had suffered quite a bit. Her children , who all live [facility], have returned to their homes. She reports that she is now able to sleep more easily, now that things have settled down.  Where/When/How: She states that her diet consists of small portions, and her neighbors continue to bring foods over. She reports a weight loss of 15 pounds. She reports that she does not check her blood pressure or blood glucose levels at home.   Patient Reported Barriers and Concerns: The patient reports she is now able to focus on her own healthcare, as she was previously preoccupied with her husbands declining health before his passing. On 10/10/24 the patient visited urgent care, complaining of a sore throat ands laryngitis. She was tested for COVID, strep and flu, all of which were negative. She reports feeling better now.   Plan for overcoming barriers: She has an upcoming appointment with Dr. Hernandez on 11/20/24 and is requesting that Dr. Hernandez put in labs orders for that appointment. She is also asking [facility] to cancel any appointments her  had scheduled. [facility] with assist with this.     A request for lab orders has been sent to Dr. Hernandez       Care managers interventions:   These interventions aim to enhance patient knowledge, improve their ability to manage their health, and ultimately lead to better health outcomes.     Self care: Encouraged the patient to take time to do the things the love and enjoy.    Preventive Health Education: The patient was  encouraged to adhere to health screenings, vaccinations , tests, and scheduled appointments.   Physical Activity: Encouraged the patient to stay active to improve physical and mental well-being.  Nutrition:Ensuring the patient is following a balanced diet that meets nutritional needs.  Sleep: Encouraged the patient to get adequate sleep and maintain a good sleep schedule.   Stress: Discussed with the patient about techniques such as mindfulness, relaxation exercises, and other methods to reduce stress.     Appointments reviewed with patient.   Future Appointments   Date Time Provider Department Center   11/20/2024 11:00 AM Oren Hernandez MD [facility]        Next Care Manager Follow Up Date: 1 Month     Reason For Follow Up: review progress and or barriers towards patient's goals.     Time Spent This Encounter Total: 29 minutes medical record review, telephone communication, care plan updates where needed, education, goals, and action plan recreation/update. Provided acknowledgment and validation to patient's concerns.   Monthly Minute Total including today: 29 minutes  Physical assessment, complete health history, and need for CCM established by Oren Hernandez MD.

## 2024-10-15 NOTE — TELEPHONE ENCOUNTER
Future Appointments   Date Time Provider Department Center   11/20/2024 11:00 AM Oren Hernandez MD [facility]     The patient has an upcoming appointment with Dr. Hernandez  She is asking for lab orders to be placed so that she may have them drawn before her appointment .    Please advise

## 2024-10-16 ENCOUNTER — TELEPHONE (OUTPATIENT)
Dept: ORTHOPEDICS CLINIC | Facility: CLINIC | Age: 84
End: 2024-10-16

## 2024-10-16 RX ORDER — LEVOTHYROXINE SODIUM 50 UG/1
50 TABLET ORAL
Qty: 90 TABLET | Refills: 3 | Status: SHIPPED | OUTPATIENT
Start: 2024-10-16

## 2024-10-16 NOTE — TELEPHONE ENCOUNTER
Patient called back she will get labs done also needed a refill of her levothyroxine. Resent refill.

## 2024-10-16 NOTE — TELEPHONE ENCOUNTER
Patient is scheduled for KEVAN knee pain. X rays in [facility] from 7/26. Please advise if additional imaging is needed.  Future Appointments   Date Time Provider Department Center   11/20/2024 11:00 AM Oren Hernandez MD [facility]   12/11/2024  2:20 PM Jhoan Edmonds MD [facility]

## 2024-11-05 ENCOUNTER — TELEPHONE (OUTPATIENT)
Dept: FAMILY MEDICINE CLINIC | Facility: CLINIC | Age: 84
End: 2024-11-05

## 2024-11-05 DIAGNOSIS — E78.2 MIXED HYPERLIPIDEMIA: ICD-10-CM

## 2024-11-06 RX ORDER — SIMVASTATIN 10 MG
10 TABLET ORAL NIGHTLY
Qty: 90 TABLET | Refills: 3 | Status: SHIPPED | OUTPATIENT
Start: 2024-11-06

## 2024-11-20 ENCOUNTER — PATIENT OUTREACH (OUTPATIENT)
Dept: CASE MANAGEMENT | Age: 84
End: 2024-11-20

## 2024-11-20 DIAGNOSIS — N18.31 TYPE 2 DIABETES MELLITUS WITH STAGE 3A CHRONIC KIDNEY DISEASE, WITHOUT LONG-TERM CURRENT USE OF INSULIN (HCC): ICD-10-CM

## 2024-11-20 DIAGNOSIS — E11.22 CKD STAGE 3 SECONDARY TO DIABETES (HCC): Primary | ICD-10-CM

## 2024-11-20 DIAGNOSIS — E78.2 MIXED HYPERLIPIDEMIA: ICD-10-CM

## 2024-11-20 DIAGNOSIS — N18.30 CKD STAGE 3 SECONDARY TO DIABETES (HCC): Primary | ICD-10-CM

## 2024-11-20 DIAGNOSIS — E11.22 TYPE 2 DIABETES MELLITUS WITH STAGE 3A CHRONIC KIDNEY DISEASE, WITHOUT LONG-TERM CURRENT USE OF INSULIN (HCC): ICD-10-CM

## 2024-11-20 DIAGNOSIS — E03.9 ACQUIRED HYPOTHYROIDISM: ICD-10-CM

## 2024-11-20 NOTE — PROGRESS NOTES
Spoke to Ino for [facility] -  monthly call     Updates to patient care team/ comments: Up-to-date    Patient reported changes in medications: None  Med Adherence  Comment: Taking as directed    Health Maintenance:  Reviewed with patient.  Health Maintenance   Topic Date Due    Zoster Vaccines (1 of 2) Never done    Diabetes Care Dilated Eye Exam  08/14/2024    COVID-19 Vaccine (3 - 2024-25 season) 09/01/2024    Influenza Vaccine (1) 10/01/2024    Diabetes Care Foot Exam  10/23/2024    Diabetes Care A1C  11/09/2024    Diabetes Care: GFR  05/09/2025    Diabetes Care: Microalb/Creat Ratio  05/09/2025    DEXA Scan  04/17/2026    MA Annual Health Assessment  Completed    Annual Depression Screening  Completed    Fall Risk Screening (Annual)  Completed    Pneumococcal Vaccine: 65+ Years  Completed     Patient current concerns:   [facility] spoke with the patient this morning and she reports doing well. She plans to spend Thanksgiving ather brother's house.   The patient has been keeping busy by purging her home in preparation for selling it and downsizing. Her family has been helping her clear out the attic, and she has a dumpster arriving in the next few days.     She has decided to rent a townhouse in a [place] and [place] community and is pleased about not having to maintain a home, especially with the upcoming cold weather. She states that her mood is stable . She mentioned that once she figured out what to do with her home and her health insurance issue was resolved,she no longer stays up thinking about it and is able to fall asleep with no problems.     She has an appointment to see Ortho on 12/11/24 for her bilateral knee pain. Imaging of both knees has been ordered for her to have done prior to her visit. She is currently using topical Voltaren and taking Tylenol Extra Strength. She is able to move around and perform her daily activities. She reports no falls.     She also has an appointment scheduled with Dr. Hernandez on 1/6/25 for a  follow up, she is aware labs have anastacia ordered for her to have them drawn prior to her visit.      She reports that her appetite has been good,she is making sure her meals are balanced. She has a neighbor that brings her soup every weekend. She has reported no more weight loss and reports her current weight as being 156 pounds.       Goals/Action Plan:    Active goal from previous outreach: The patient wants to stay healthy and active and maintain her independence.     Patient reported progress towards goals: The patient continues to work towards goals.  What: She has an appointment to see Ortho on 12/11/24 for her bilateral knee pain. Imaging of both knees has been ordered for her to have done prior to her visit. She is currently using topical Voltaren and taking Tylenol Extra Strength. She is able to move around and perform her daily activities. She reports no falls.   Where/When/How: She also has an appointment scheduled with Dr. Hernandez on 1/6/25 for a follow up, she is aware labs have anastacia ordered for her to have them drawn prior to her visit. She reports that her appetite has been good,she is making sure her meals are balanced. She has a neighbor that brings her soup every weekend. She has reported no more weight loss and reports her current weight as being 156 pounds.     Patient Reported Barriers and Concerns: The patient has been keeping busy by purging her home in preparation for selling it and downsizing. Her family has been helping her clear out the attic, and she has a dumpster arriving in the next few days.   Plan for overcoming barriers: She has decided to rent a townhouse in a 55 and older community and is pleased about not having to maintain a home, especially with the upcoming cold weather. She states that her mood is stable . She mentioned that once she figured out what to do with her home and her health insurance issue was resolved,she no longer stays up thinking about it and is able to fall asleep  with no problems.       Care managers interventions:    These interventions aim to enhance patient knowledge, improve their ability to manage their health, and ultimately lead to better health outcomes.     Self care: Encouraged the patient to take time to do the things the love and enjoy.    Preventive Health Education: The patient was encouraged to adhere to health screenings, vaccinations, tests, and scheduled appointments.   Physical Activity: Encouraged the patient to stay active to improve physical and mental well-being.  Nutrition:Ensuring the patient is following a balanced diet that meets nutritional needs.  Sleep: Encouraged the patient to get adequate sleep and maintain a good sleep schedule.     Appointments reviewed with patient.   Future Appointments   Date Time Provider Department Center   12/11/2024  2:00 PM NAP XR [facility]2 [facility] X[facility] [place]   12/11/2024  2:15 PM NAP XR RM2 [facility] XRAY [facility] [place]   12/11/2024  2:20 PM Jhoan Edmonds MD [facility]   1/6/2025 12:45 PM Oren Hernandez MD [facility]        Next Care Manager Follow Up Date: 1 Month     Reason For Follow Up: review progress and or barriers towards patient's goals.     Time Spent This Encounter Total: 22 minutes on medical record review, telephone communication, care plan updates where needed, education, goals, and action plan recreation/update. Provided acknowledgment and validation to patient's concerns.   Monthly Minute Total including today: 22 minutes  Physical assessment, complete health history, and need for CCM established by Oren Hernandez MD.

## 2024-11-22 ENCOUNTER — TELEPHONE (OUTPATIENT)
Dept: FAMILY MEDICINE CLINIC | Facility: CLINIC | Age: 84
End: 2024-11-22

## 2024-11-22 DIAGNOSIS — I10 ESSENTIAL HYPERTENSION: Chronic | ICD-10-CM

## 2024-11-27 RX ORDER — OMEPRAZOLE 40 MG/1
40 CAPSULE, DELAYED RELEASE ORAL
Qty: 90 CAPSULE | Refills: 3 | Status: SHIPPED | OUTPATIENT
Start: 2024-11-27

## 2024-11-27 RX ORDER — LOSARTAN POTASSIUM 100 MG/1
100 TABLET ORAL DAILY
Qty: 90 TABLET | Refills: 3 | Status: SHIPPED | OUTPATIENT
Start: 2024-11-27

## 2024-12-11 ENCOUNTER — HOSPITAL ENCOUNTER (OUTPATIENT)
Dept: GENERAL RADIOLOGY | Age: 84
Discharge: HOME | End: 2024-12-11
Attending: ORTHOPAEDIC SURGERY
Payer: MEDICARE

## 2024-12-11 ENCOUNTER — OFFICE VISIT (OUTPATIENT)
Dept: ORTHOPEDICS CLINIC | Facility: CLINIC | Age: 84
End: 2024-12-11
Payer: MEDICARE

## 2024-12-11 VITALS — BODY MASS INDEX: 29.95 KG/M2 | WEIGHT: 158.63 LBS | HEIGHT: 61 IN

## 2024-12-11 DIAGNOSIS — M25.561 PAIN IN BOTH KNEES, UNSPECIFIED CHRONICITY: ICD-10-CM

## 2024-12-11 DIAGNOSIS — M25.562 PAIN IN BOTH KNEES, UNSPECIFIED CHRONICITY: ICD-10-CM

## 2024-12-11 DIAGNOSIS — M17.0 PRIMARY OSTEOARTHRITIS OF BOTH KNEES: Primary | ICD-10-CM

## 2024-12-11 PROCEDURE — 99204 OFFICE O/P NEW MOD 45 MIN: CPT | Performed by: ORTHOPAEDIC SURGERY

## 2024-12-11 PROCEDURE — 73564 X-RAY EXAM KNEE 4 OR MORE: CPT | Performed by: ORTHOPAEDIC SURGERY

## 2024-12-11 PROCEDURE — 1159F MED LIST DOCD IN RCRD: CPT | Performed by: ORTHOPAEDIC SURGERY

## 2024-12-11 PROCEDURE — 20610 DRAIN/INJ JOINT/BURSA W/O US: CPT | Performed by: ORTHOPAEDIC SURGERY

## 2024-12-11 PROCEDURE — 1160F RVW MEDS BY RX/DR IN RCRD: CPT | Performed by: ORTHOPAEDIC SURGERY

## 2024-12-11 RX ORDER — TRIAMCINOLONE ACETONIDE 40 MG/ML
80 INJECTION, SUSPENSION INTRA-ARTICULAR; INTRAMUSCULAR ONCE
Status: COMPLETED | OUTPATIENT
Start: 2024-12-11 | End: 2024-12-11

## 2024-12-11 RX ADMIN — TRIAMCINOLONE ACETONIDE 80 MG: 40 INJECTION, SUSPENSION INTRA-ARTICULAR; INTRAMUSCULAR at 14:56:00

## 2024-12-11 NOTE — H&P
[facility] New Patient Note    CC:   Chief Complaint   Patient presents with    Knee Pain     Bilateral knee pain       HPI: This 84 year old female presents today with complaints of bilateral knee pain right worse than left.  She states that the knees have been hurting for about 5 months.  She was caring for her , walking up and down 14 stairs multiple times per day, notes that this seemed to have exacerbate and triggered the knee pain.  She reports pain initially was along the outside of the knee, points laterally, now more anteriorly.  She has been taking Tylenol and using Voltaren gel on the left, notes that this has helped.  She cannot take NSAIDs due to medical issues.  No injections in the past.  No previous injuries.    Past Medical History:    Breast cancer (HCC)    Combined forms of age-related cataract, bilateral    10/19/16 Ophthalmology consult     Plantar fasciitis    S/P lumpectomy, right breast    Traumatic rupture of left posterior tibial tendon, initial encounter     Past Surgical History:   Procedure Laterality Date    Colonoscopy  1/1/2011    Dr Stauffer    Esophagoscopy,biopsy  3/1/2011    Dr Saravia    Hysterectomy  2/12/13    Prolapse    Lumpectomy right  9/12    Radiation right Right 09/2012     Current Outpatient Medications   Medication Sig Dispense Refill    LOSARTAN 100 MG Oral Tab TAKE ONE TABLET BY MOUTH EVERY DAY. 90 tablet 3    OMEPRAZOLE 40 MG Oral Capsule Delayed Release TAKE ONE CAPSULE BY MOUTH EVERY DAY BEFORE breakfast. 90 capsule 3    simvastatin 10 MG Oral Tab TAKE ONE TABLET BY MOUTH NIGHTLY 90 tablet 3    levothyroxine 50 MCG Oral Tab Take 1 tablet (50 mcg total) by mouth before breakfast. 90 tablet 3    traZODone 50 MG Oral Tab Take 1 tablet (50 mg total) by mouth nightly. 90 tablet 1    hydroCHLOROthiazide 12.5 MG Oral Tab Take 1 tablet (12.5 mg total) by mouth daily. 90 tablet 3    escitalopram 20 MG Oral Tab Take 1 tablet (20 mg total) by mouth daily. 90 tablet 3     triamcinolone 0.1 % External Ointment Apply to breast rash twice a day 30 g 1    mupirocin 2 % External Ointment Apply topically twice a day for 7 days 15 g 0    Alclometasone Dipropionate 0.05 % External Ointment Apply to nose twice daily 15 g 1    allopurinol 300 MG Oral Tab Take 1 tablet (300 mg total) by mouth daily. 90 tablet 3    Mirabegron ER (MYRBETRIQ) 50 MG Oral Tablet 24 Hr Take 1 tablet (50 mg total) by mouth daily. 90 tablet 3    dexamethasone 1 MG Oral Tab Take 1 tablet (1 mg total) by mouth once for 1 dose. Take 1 tablet (1 mg total) by mouth once at 11 PM the night prior to your 8 AM lab draw - Oral 1 tablet 0    famotidine 20 MG Oral Tab Take 1 tablet (20 mg total) by mouth 2 (two) times daily. OK with omeprazole 180 tablet 3    Nystatin (NYSTOP) 683303 UNIT/GM External Powder Apply 1 Application topically 2 (two) times daily as needed (rash in skin folds). 15 g 1    Cholecalciferol (VITAMIN D-3 OR) Take 1 tablet by mouth daily.      magnesium 250 MG Oral Tab Take 1 tablet (250 mg total) by mouth daily.      Psyllium 0.52 g Oral Cap Take by mouth 2 (two) times daily.      Ascorbic Acid (VITAMIN C) 500 MG Oral Cap Take 1,000 mg by mouth.      Aspirin (ASPIR-81 OR) Take  by mouth daily.      MULTI-VITAMIN OR None Entered       Allergies[1]  Family History   Problem Relation Age of Onset    Heart Disease Father         Congenital heart disease    Stroke Mother     Breast Cancer Self 71     Social History     Occupational History    Occupation: Retired    Tobacco Use    Smoking status: Never    Smokeless tobacco: Never    Tobacco comments:     Updated 12/11/24   Vaping Use    Vaping status: Never Used   Substance and Sexual Activity    Alcohol use: No     Alcohol/week: 0.0 standard drinks of alcohol    Drug use: No    Sexual activity: Yes     Partners: Male        ROS:  Detailed system review obtained and negative except as mentioned above      Physical Exam:    Ht 5' 1\" (1.549 m)   Wt 158 lb 9.6 oz  (71.9 kg)   BMI 29.97 kg/m²   Constitutional: Awake, alert, no distress.  Very pleasant  Psychological: Appropriate affect.  Respiratory: Unlabored breathing.  Bilateral lower extremity:  Inspection: skin intact, no effusion to the knees  Palpation: No focal tenderness medial lateral joint line or patellofemoral joint  Range of motion: Demonstrates extension within a few degrees of full, flexion past 100/110  Knee stable to varus valgus stress  Neuromuscular: 5 out of 5 quad strength  Vascular: Warm well-perfused      Imaging: X-rays of both the right and left knees personally viewed, independently interpreted and radiology report read.  PA flexion view with severe valgus osteoarthritis of both knees, complete loss of lateral joint space, subchondral sclerosis.  Lateral view demonstrates osteophytes with patellofemoral joint space narrowing.      Labs: Hemoglobin A1c 6.0      Assessment/Plan:  Diagnoses and all orders for this visit:    Primary osteoarthritis of both knees      Assessment: 84-year-old female with symptomatic radiographically severe bilateral knee valgus osteoarthritis    Plan: I discussed the etiology, natural history, and management options for symptomatic knee osteoarthritis.  I discussed nonsurgical and surgical treatments, with nonsurgical treatments to include anti-inflammatory medications, injections, activity modification, weight loss, low impact exercise and possible therapy.  Surgery would be with knee replacement and is an elective operation reserved for when nonsurgical treatments no longer alleviate symptoms sufficiently.  She expressed understanding of this discussion.  Surgery is not something she wants to consider for herself.  She would like to attempt nonsurgical treatments.  She is limited with medications due to medical issues, would like to stick with Tylenol and Voltaren.  We did discuss the role of injection.  She did have questions about viscosupplementation.  I did discuss  with her viscosupplementation and steroid injections.  Will proceed with steroid injection to both knees today.  Advised that if she does not get sufficient relief within the next few weeks, to contact the clinic and we can initiate authorization process for Synvisc 1.  Otherwise may call to repeat steroid injection in 3 months or more.    Jhoan Edmonds MD, FAAOS  [facility]  Phone [phone]  Fax [phone]         [1] No Known Allergies

## 2024-12-11 NOTE — PROCEDURES
Risks and benefits of knee injection discussed with the patient, with risks including but not limited to pain and swelling at the injection site and/or within the knee joint, infection, elevation in blood pressure and/or glucose levels, facial flushing.  After informed consent, the patient's right and left knees were marked, locally anesthetized with skin refrigerant, prepped with topical antiseptic, and injected with a mixture of 1mL 40mg/mL Kenalog, 2mL 1% lidocaine and 2mL 0.5% marcaine through the inferolateral portal.  A band-aid was applied.  The patient tolerated the procedure well.    Jhoan Edmonds MD, FAAOS  [facility]  Phone [phone]  Fax [phone]

## 2024-12-12 ENCOUNTER — HOSPITAL ENCOUNTER (OUTPATIENT)
Age: 84
Discharge: HOME | End: 2024-12-12
Payer: MEDICARE

## 2024-12-12 ENCOUNTER — APPOINTMENT (OUTPATIENT)
Dept: GENERAL RADIOLOGY | Age: 84
End: 2024-12-12
Attending: NURSE PRACTITIONER
Payer: MEDICARE

## 2024-12-12 VITALS
WEIGHT: 158 LBS | TEMPERATURE: 99 F | OXYGEN SATURATION: 96 % | SYSTOLIC BLOOD PRESSURE: 141 MMHG | RESPIRATION RATE: 18 BRPM | DIASTOLIC BLOOD PRESSURE: 81 MMHG | HEART RATE: 96 BPM | BODY MASS INDEX: 28.35 KG/M2 | HEIGHT: 62.5 IN

## 2024-12-12 DIAGNOSIS — S76.212A INGUINAL STRAIN, LEFT, INITIAL ENCOUNTER: Primary | ICD-10-CM

## 2024-12-12 PROCEDURE — 73502 X-RAY EXAM HIP UNI 2-3 VIEWS: CPT | Performed by: NURSE PRACTITIONER

## 2024-12-12 PROCEDURE — 99213 OFFICE O/P EST LOW 20 MIN: CPT

## 2024-12-12 PROCEDURE — 99214 OFFICE O/P EST MOD 30 MIN: CPT

## 2024-12-12 NOTE — ED INITIAL ASSESSMENT (HPI)
Cleaning the floor, put clorox wipe on the floor, was cleaning behind the toilet. Felt a pop in left hip. Painful to walk.

## 2024-12-12 NOTE — ED PROVIDER NOTES
Patient Seen in: [facility]      History     Chief Complaint   Patient presents with    Hip Pain     Stated Complaint: Hip Pain    Subjective:   84-year-old female presents to immediate care with left groin pain.  Patient states she was cleaning off her floor with her foot and a towel when she stretched forward felt a pop in her hip and groin.  Has had been having pain with walking since.      Objective:     Past Medical History:    Breast cancer (HCC)    Combined forms of age-related cataract, bilateral    10/19/16 Ophthalmology consult     Plantar fasciitis    S/P lumpectomy, right breast    Traumatic rupture of left posterior tibial tendon, initial encounter              Past Surgical History:   Procedure Laterality Date    Colonoscopy  1/1/2011    Dr Stauffer    Esophagoscopy,biopsy  3/1/2011    Dr Saravia    Hysterectomy  2/12/13    Prolapse    Lumpectomy right  9/12    Radiation right Right 09/2012                Social History     Socioeconomic History    Marital status:    Occupational History    Occupation: Retired    Tobacco Use    Smoking status: Never    Smokeless tobacco: Never    Tobacco comments:     Updated 12/11/24   Vaping Use    Vaping status: Never Used   Substance and Sexual Activity    Alcohol use: No     Alcohol/week: 0.0 standard drinks of alcohol    Drug use: No    Sexual activity: Yes     Partners: Male   Other Topics Concern    Caffeine Concern No    Exercise No    Seat Belt Yes     Social Drivers of Health     Financial Resource Strain: Low Risk  (7/5/2024)    Financial Resource Strain     Difficulty of Paying Living Expenses: Not hard at all     Med Affordability: No   Food Insecurity: No Food Insecurity (7/5/2024)    Food Insecurity     Food Insecurity: Never true   Transportation Needs: No Transportation Needs (7/5/2024)    Transportation Needs     Lack of Transportation: No   Stress: No Stress Concern Present (7/5/2024)    Stress     Feeling of Stress : No   Social  Connections: Socially Integrated (7/5/2024)    Social Connections     Frequency of Socialization with Friends and Family: 3   Housing Stability: Low Risk  (7/5/2024)    Housing Stability     Housing Instability: No              Review of Systems   Constitutional: Negative.    Respiratory: Negative.     Cardiovascular: Negative.    Gastrointestinal: Negative.    Skin: Negative.    Neurological: Negative.        Positive for stated complaint: Hip Pain  Other systems are as noted in HPI.  Constitutional and vital signs reviewed.      All other systems reviewed and negative except as noted above.    Physical Exam     ED Triage Vitals [12/12/24 1350]   /81   Pulse 96   Resp 18   Temp 99.2 °F (37.3 °C)   Temp src Oral   SpO2 96 %   O2 Device None (Room air)       Current Vitals:   Vital Signs  BP: 141/81  Pulse: 96  Resp: 18  Temp: 99.2 °F (37.3 °C)  Temp src: Oral    Oxygen Therapy  SpO2: 96 %  O2 Device: None (Room air)        Physical Exam  Vitals and nursing note reviewed.   Constitutional:       General: She is not in acute distress.  HENT:      Head: Normocephalic.   Cardiovascular:      Rate and Rhythm: Normal rate.   Pulmonary:      Effort: Pulmonary effort is normal.   Musculoskeletal:         General: Normal range of motion.      Right hip: Normal.      Left hip: Deformity and tenderness present. No bony tenderness or crepitus. Normal range of motion.   Skin:     General: Skin is warm and dry.   Neurological:      General: No focal deficit present.      Mental Status: She is alert and oriented to person, place, and time.           ED Course   Labs Reviewed - No data to display  XR HIP W OR WO PELVIS 2 OR 3 VIEWS, LEFT (CPT=73502)    Result Date: 12/12/2024  PROCEDURE:  XR HIP W OR WO PELVIS 2 OR 3 VIEWS, LEFT (CPT=73502)  TECHNIQUE:  Unilateral 2 to 3 views of the hip and pelvis if performed.  COMPARISON:  None.  INDICATIONS:  Hip Pain after feeling a pop  PATIENT STATED HISTORY: (As transcribed by  Technologist)  Patient states she felt a pop in her left hip earlier today. She has pain and discomfort.   FINDINGS:  Negative for fracture, dislocation, deformity or other acute bony abnormality. Osteoarthritic changes are noted, minimal degree.  No soft tissue abnormalities.             CONCLUSION:  Minimal osteoarthritic changes are present. No acute fracture or other acute bony process.   LOCATION:  [id number]   Dictated by (CST): Roney Carrillo MD on 12/12/2024 at 3:24 PM     Finalized by (CST): Roney Carrillo MD on 12/12/2024 at 3:24 PM       XR KNEE, COMPLETE (4 OR MORE VIEWS), LEFT (CPT=73564)    Result Date: 12/11/2024  PROCEDURE:  XR KNEE, COMPLETE (4 OR MORE VIEWS), LEFT (CPT=73564)  TECHNIQUE:  AP, lateral, sunrise, and tunnel views were obtained  COMPARISON:  None.  INDICATIONS:  M25.562 Pain in both knees, unspecified chronicity M25.561 Pain in both knees, unspecified chronicity  PATIENT STATED HISTORY: (As transcribed by Technologist)  Patient is having ortho exam. She has chronic bilateral knee pain.    FINDINGS:  No fracture or dislocation.  Lateral joint space narrowing.  Tricompartmental marginal osteophytes.  Atherosclerotic calcifications            CONCLUSION:  Moderate tricompartmental osteoarthritic changes with lateral joint space narrowing.   LOCATION:  [facility]   Dictated by (CST): Oniel Valentin MD on 12/11/2024 at 2:15 PM     Finalized by (CST): Oniel Valentin MD on 12/11/2024 at 2:16 PM       XR KNEE, COMPLETE (4 OR MORE VIEWS), RIGHT (CPT=73564)    Result Date: 12/11/2024  PROCEDURE:  XR KNEE, COMPLETE (4 OR MORE VIEWS), RIGHT (CPT=73564)  TECHNIQUE:  AP, lateral, sunrise, and tunnel views were obtained  COMPARISON:  None.  INDICATIONS:  M25.562 Pain in both knees, unspecified chronicity M25.561 Pain in both knees, unspecified chronicity  PATIENT STATED HISTORY: (As transcribed by Technologist)  Patient is having ortho exam. She has chronic bilateral knee pain.    FINDINGS:  No fracture or  dislocation.  Lateral joint space narrowing with prominent lateral marginal osteophytes.  Small marginal osteophytes of patellofemoral and medial joint compartments.  Mild suprapatellar soft tissue fullness.  Atherosclerotic calcifications.            CONCLUSION:  Tricompartmental osteoarthritic changes, most severe laterally.  Small joint effusion.   LOCATION:  [facility]   Dictated by (CST): Oniel Valentin MD on 12/11/2024 at 2:14 PM     Finalized by (CST): Oniel Valentin MD on 12/11/2024 at 2:15 PM         [facility]      Medical Decision Making  Pertinent Labs & Imaging studies reviewed. (See chart for details).  Patient coming in with left hip pain.   Differential diagnosis includes dislocation, groin strain  Will discharge on supportive care.   Patient is comfortable with this plan.     Overall Pt looks good. Non-toxic, well-hydrated and in no respiratory distress. Vital signs are reassuring. Exam is reassuring. I do not believe pt requires and additional diagnostic studies or intervention. I believe pt can be discharged home to continue evaluation as an outpatient. Follow-up provider given. Discharge instructions given and reviewed. Return for any problems. All understand and agrees with the plan.        Problems Addressed:  Inguinal strain, left, initial encounter: acute illness or injury    Amount and/or Complexity of Data Reviewed  Radiology: ordered and independent interpretation performed. Decision-making details documented in ED Course.     Details: I reviewed the images and my independent interpreation after review is no acute fracture or dislocation. Additionaly, I reviewd the radiology report as noted in ed course        Disposition and Plan     Clinical Impression:  1. Inguinal strain, left, initial encounter         Disposition:  Discharge  12/12/2024  3:55 pm    Follow-up:  Oren Hernandez MD  [address] [place] [id number]  [phone]          Jhoan Edmonds MD  [address] [place] [address]  [address]  [facility] [address]  [phone]                Medications Prescribed:  Discharge Medication List as of 12/12/2024  4:00 PM              Supplementary Documentation:

## 2024-12-19 ENCOUNTER — PATIENT OUTREACH (OUTPATIENT)
Dept: CASE MANAGEMENT | Age: 84
End: 2024-12-19

## 2024-12-19 DIAGNOSIS — N18.30 CKD STAGE 3 SECONDARY TO DIABETES (HCC): ICD-10-CM

## 2024-12-19 DIAGNOSIS — E11.22 TYPE 2 DIABETES MELLITUS WITH STAGE 3A CHRONIC KIDNEY DISEASE, WITHOUT LONG-TERM CURRENT USE OF INSULIN (HCC): Primary | ICD-10-CM

## 2024-12-19 DIAGNOSIS — N18.31 TYPE 2 DIABETES MELLITUS WITH STAGE 3A CHRONIC KIDNEY DISEASE, WITHOUT LONG-TERM CURRENT USE OF INSULIN (HCC): Primary | ICD-10-CM

## 2024-12-19 DIAGNOSIS — E03.9 ACQUIRED HYPOTHYROIDISM: ICD-10-CM

## 2024-12-19 DIAGNOSIS — E11.22 CKD STAGE 3 SECONDARY TO DIABETES (HCC): ICD-10-CM

## 2024-12-19 DIAGNOSIS — E78.2 MIXED HYPERLIPIDEMIA: ICD-10-CM

## 2024-12-19 DIAGNOSIS — M1A.0720 CHRONIC IDIOPATHIC GOUT INVOLVING TOE OF LEFT FOOT WITHOUT TOPHUS: Chronic | ICD-10-CM

## 2024-12-19 DIAGNOSIS — M1A.0720 CHRONIC IDIOPATHIC GOUT INVOLVING TOE OF LEFT FOOT WITHOUT TOPHUS: ICD-10-CM

## 2024-12-19 DIAGNOSIS — N32.81 OVERACTIVE BLADDER: ICD-10-CM

## 2024-12-19 RX ORDER — ALLOPURINOL 300 MG/1
300 TABLET ORAL DAILY
Qty: 90 TABLET | Refills: 3 | Status: SHIPPED | OUTPATIENT
Start: 2024-12-19

## 2024-12-19 RX ORDER — MIRABEGRON 50 MG/1
50 TABLET, FILM COATED, EXTENDED RELEASE ORAL DAILY
Qty: 90 TABLET | Refills: 3 | Status: SHIPPED | OUTPATIENT
Start: 2024-12-19

## 2024-12-19 NOTE — TELEPHONE ENCOUNTER
Future Appointments   Date Time Provider Department Center   1/6/2025 12:45 PM Oren Hernandez MD [facility]      [facility] spoke with the patient this morning and she is needing refills on 2 medications:    Allopurinol 300 mg - last refilled 1/14/24 #90+3 refills   Myrbetriq 50 mg last refilled 11/20/23 #90+3 refills     Medications are pended

## 2024-12-19 NOTE — PROGRESS NOTES
Spoke to Ino for CCM -  monthly call     Updates to patient care team/ comments: Up-to-date /Reviewed with patient     Patient reported changes in medications: None  Med Adherence  Comment: Taking as directed    Health Maintenance:  Reviewed with patient.  Health Maintenance   Topic Date Due    Zoster Vaccines (1 of 2) Never done    Diabetes Care Dilated Eye Exam  08/14/2024    COVID-19 Vaccine (3 - 2024-25 season) 09/01/2024    Influenza Vaccine (1) 10/01/2024    Diabetes Care Foot Exam  10/23/2024    Diabetes Care A1C  11/09/2024    Diabetes Care: GFR  05/09/2025    Diabetes Care: Microalb/Creat Ratio  05/09/2025    DEXA Scan  04/17/2026    MA Annual Health Assessment  Completed    Annual Depression Screening  Completed    Fall Risk Screening (Annual)  Completed    Pneumococcal Vaccine: 65+ Years  Completed     Patient current concerns:   CCM spoke with the patient this morning. She is still in the process of cleaning out her home and getting ready to move.     On 12/11/24, the patient was seen by Dr. Edmonds for bilateral knee pain. X-rays on both knees revealed osteoarthritis. She received an injection in each knee and reports significant improvement.     On 12/12/24, the patient visited [facility] urgent care for left hip pain. She reported hearing a pop while cleaning, which made walking painful. An X-ray showed no fracture. Today, she states that her hip is feeling better and notes she has been taking Tylenol and applying Voltaren gel for relief.     The patient is asking CCM to put in a request for refills on Allopurinol and Myrbetriq. CCM will assist with medication refills.        Goals/Action Plan:    Active goal from previous outreach: The patient wants to stay healthy and active and maintain her independence.     Patient reported progress towards goals: The patient continues to work towards goals.    Patient Reported Barriers and Concerns: On 12/11/24, the patient was seen by Dr. Edmonds  for bilateral knee pain. X-rays on both knees revealed osteoarthritis. She received an injection in each knee and reports significant improvement.   Plan for overcoming barriers: On 12/12/24, the patient visited [facility] urgent care for left hip pain. She reported hearing a pop while cleaning, which made walking painful. An X-ray showed no fracture. Today, she states that her hip is feeling better and notes she has been taking Tylenol and applying Voltaren gel for relief.       Care managers interventions:    These interventions aim to enhance patient knowledge, improve their ability to manage their health, and ultimately lead to better health outcomes.     An refill request has been sent to Dr. Hernandez   Self care: Encouraged the patient to take time to do the things the love and enjoy.    Preventive Health Education: The patient was encouraged to adhere to health screenings, vaccinations, tests, and scheduled appointments.   Physical Activity: Encouraged the patient to stay active to improve physical and mental well-being.  Diabetes: Discussed importance of proper pedal hygiene, regular foot checks, and tight glucose control.     Appointments reviewed with patient.   Future Appointments   Date Time Provider Department Center   1/6/2025 12:45 PM Oren Hernandez MD [facility]        Next Care Manager Follow Up Date: 1 Month     Reason For Follow Up: review progress and or barriers towards patient's goals.     Time Spent This Encounter Total: 26 minutes medical record review, telephone communication, care plan updates where needed, education, goals, and action plan recreation/update. Provided acknowledgment and validation to patient's concerns.   Monthly Minute Total including today: 26 minutes  Physical assessment, complete health history, and need for CCM established by Oren Hernandez MD.

## 2024-12-19 NOTE — TELEPHONE ENCOUNTER
Requested Prescriptions     Signed Prescriptions Disp Refills    allopurinol 300 MG Oral Tab 90 tablet 3     Sig: Take 1 tablet (300 mg total) by mouth daily.     Authorizing Provider: JEN MEDINA     Ordering User: JENNA VEGA    Mirabegron ER (MYRBETRIQ) 50 MG Oral Tablet 24 Hr 90 tablet 3     Sig: Take 1 tablet (50 mg total) by mouth daily.     Authorizing Provider: JEN MEDINA     Ordering User: JENNA VEGA      Refilled per protocol/OV notes

## 2025-01-02 ENCOUNTER — LAB ENCOUNTER (OUTPATIENT)
Dept: LAB | Age: 85
End: 2025-01-02
Attending: FAMILY MEDICINE
Payer: MEDICARE

## 2025-01-02 DIAGNOSIS — R73.9 HYPERGLYCEMIA: ICD-10-CM

## 2025-01-02 DIAGNOSIS — Z13.6 SCREENING FOR CARDIOVASCULAR CONDITION: ICD-10-CM

## 2025-01-02 DIAGNOSIS — E79.0 HYPERURICEMIA: ICD-10-CM

## 2025-01-02 DIAGNOSIS — N18.31 STAGE 3A CHRONIC KIDNEY DISEASE (CKD) (HCC): ICD-10-CM

## 2025-01-02 DIAGNOSIS — E78.5 DYSLIPIDEMIA: ICD-10-CM

## 2025-01-02 DIAGNOSIS — E11.22 TYPE 2 DIABETES MELLITUS WITH STAGE 3A CHRONIC KIDNEY DISEASE, WITHOUT LONG-TERM CURRENT USE OF INSULIN (HCC): ICD-10-CM

## 2025-01-02 DIAGNOSIS — N18.31 TYPE 2 DIABETES MELLITUS WITH STAGE 3A CHRONIC KIDNEY DISEASE, WITHOUT LONG-TERM CURRENT USE OF INSULIN (HCC): ICD-10-CM

## 2025-01-02 DIAGNOSIS — I10 ESSENTIAL HYPERTENSION: ICD-10-CM

## 2025-01-02 LAB
ALBUMIN SERPL-MCNC: 4.2 G/DL (ref 3.2–4.8)
ALBUMIN/GLOB SERPL: 2.2 {RATIO} (ref 1–2)
ALP LIVER SERPL-CCNC: 72 U/L
ALT SERPL-CCNC: 26 U/L
ANION GAP SERPL CALC-SCNC: 5 MMOL/L (ref 0–18)
AST SERPL-CCNC: 32 U/L (ref ?–34)
BASOPHILS # BLD AUTO: 0.01 X10(3) UL (ref 0–0.2)
BASOPHILS NFR BLD AUTO: 0.2 %
BILIRUB SERPL-MCNC: 0.6 MG/DL (ref 0.2–1.1)
BUN BLD-MCNC: 16 MG/DL (ref 9–23)
CALCIUM BLD-MCNC: 9.4 MG/DL (ref 8.7–10.4)
CHLORIDE SERPL-SCNC: 109 MMOL/L (ref 98–112)
CHOLEST SERPL-MCNC: 145 MG/DL (ref ?–200)
CO2 SERPL-SCNC: 29 MMOL/L (ref 21–32)
CREAT BLD-MCNC: 1.01 MG/DL
EGFRCR SERPLBLD CKD-EPI 2021: 56 ML/MIN/1.73M2 (ref 60–?)
EOSINOPHIL # BLD AUTO: 0.14 X10(3) UL (ref 0–0.7)
EOSINOPHIL NFR BLD AUTO: 2.7 %
ERYTHROCYTE [DISTWIDTH] IN BLOOD BY AUTOMATED COUNT: 13.7 %
EST. AVERAGE GLUCOSE BLD GHB EST-MCNC: 117 MG/DL (ref 68–126)
FASTING PATIENT LIPID ANSWER: YES
FASTING STATUS PATIENT QL REPORTED: YES
GLOBULIN PLAS-MCNC: 1.9 G/DL (ref 2–3.5)
GLUCOSE BLD-MCNC: 84 MG/DL (ref 70–99)
HBA1C MFR BLD: 5.7 % (ref ?–5.7)
HCT VFR BLD AUTO: 36.8 %
HDLC SERPL-MCNC: 69 MG/DL (ref 40–59)
HGB BLD-MCNC: 12.1 G/DL
IMM GRANULOCYTES # BLD AUTO: 0.01 X10(3) UL (ref 0–1)
IMM GRANULOCYTES NFR BLD: 0.2 %
LDLC SERPL CALC-MCNC: 63 MG/DL (ref ?–100)
LYMPHOCYTES # BLD AUTO: 1.09 X10(3) UL (ref 1–4)
LYMPHOCYTES NFR BLD AUTO: 20.7 %
MCH RBC QN AUTO: 32.7 PG (ref 26–34)
MCHC RBC AUTO-ENTMCNC: 32.9 G/DL (ref 31–37)
MCV RBC AUTO: 99.5 FL
MONOCYTES # BLD AUTO: 0.41 X10(3) UL (ref 0.1–1)
MONOCYTES NFR BLD AUTO: 7.8 %
NEUTROPHILS # BLD AUTO: 3.6 X10 (3) UL (ref 1.5–7.7)
NEUTROPHILS # BLD AUTO: 3.6 X10(3) UL (ref 1.5–7.7)
NEUTROPHILS NFR BLD AUTO: 68.4 %
NONHDLC SERPL-MCNC: 76 MG/DL (ref ?–130)
OSMOLALITY SERPL CALC.SUM OF ELEC: 296 MOSM/KG (ref 275–295)
PLATELET # BLD AUTO: 160 10(3)UL (ref 150–450)
POTASSIUM SERPL-SCNC: 3.8 MMOL/L (ref 3.5–5.1)
PROT SERPL-MCNC: 6.1 G/DL (ref 5.7–8.2)
RBC # BLD AUTO: 3.7 X10(6)UL
SODIUM SERPL-SCNC: 143 MMOL/L (ref 136–145)
TRIGL SERPL-MCNC: 65 MG/DL (ref 30–149)
URATE SERPL-MCNC: 2.8 MG/DL
VLDLC SERPL CALC-MCNC: 10 MG/DL (ref 0–30)
WBC # BLD AUTO: 5.3 X10(3) UL (ref 4–11)

## 2025-01-02 PROCEDURE — 80053 COMPREHEN METABOLIC PANEL: CPT

## 2025-01-02 PROCEDURE — 80061 LIPID PANEL: CPT

## 2025-01-02 PROCEDURE — 83036 HEMOGLOBIN GLYCOSYLATED A1C: CPT

## 2025-01-02 PROCEDURE — 84550 ASSAY OF BLOOD/URIC ACID: CPT

## 2025-01-02 PROCEDURE — 36415 COLL VENOUS BLD VENIPUNCTURE: CPT

## 2025-01-02 PROCEDURE — 85025 COMPLETE CBC W/AUTO DIFF WBC: CPT

## 2025-01-03 PROBLEM — E11.22 CKD STAGE 3 SECONDARY TO DIABETES (HCC): Status: RESOLVED | Noted: 2017-05-11 | Resolved: 2025-01-03

## 2025-01-03 PROBLEM — N18.30 CKD STAGE 3 SECONDARY TO DIABETES (HCC): Status: RESOLVED | Noted: 2017-05-11 | Resolved: 2025-01-03

## 2025-01-04 NOTE — ASSESSMENT & PLAN NOTE
BP shows poor control with last BP of 141/81. Lifestyle changes, diet, exercise and weight loss were counseled. Medication changes as listed.   1/2/2025: Potassium 3.8; Creatinine 1.01; eGFR-Cr 56 (L)  BP Meds: hydroCHLOROthiazide Tabs - 12.5 MG; losartan Tabs - 100 MG   ACE/ARB Adherence Good at 95%  CKD 3a with DM

## 2025-01-04 NOTE — ASSESSMENT & PLAN NOTE
Diabetes: A1c is 5.7 done 1/2/2025 which shows Excellent control. Continue current meds and lifestyle modification.   DM Meds:       Diabetic Complications: CKD 3a (GFR 56).   Cataract history    Diabetes is : stable Continue current treatment regimen. Reassess Diabetes in : in 6 months

## 2025-01-04 NOTE — ASSESSMENT & PLAN NOTE
Cholesterol shows Good control. Long term heart-healthy diet and lifestyle discussed and encouraged to reduce risk of cardiovascular disease.  1/2/2025: Cholesterol, Total 145; HDL Cholesterol 69 (H); Triglycerides 65; LDL Cholesterol 63  Cholesterol Meds: simvastatin Tabs - 10 MG  stable  Continue with current treatment plan

## 2025-01-04 NOTE — ASSESSMENT & PLAN NOTE
1/2/2025: ALT 26; AST 32 stable, continue present management and continue to monitor for progression

## 2025-01-04 NOTE — ASSESSMENT & PLAN NOTE
Thyroid shows Good control.   5/9/2024: TSH 2.480  Thryoid Meds: levothyroxine Tabs - 50 MCG well controlled current treatment plan effective, no change in therapy

## 2025-01-04 NOTE — ASSESSMENT & PLAN NOTE
Mopderate depression well controlled on medicaitons. Clinical Course: stable   Good control    Antidepressant Meds: escitalopram Tabs - 20 MG; traZODone Tabs - 50 MG

## 2025-01-04 NOTE — ASSESSMENT & PLAN NOTE
Last Platelet value was 160 done 1/2/2025. Lowest level in the last 10 years was 125. Improving platelets. Continue to monitor and continue present management

## 2025-01-04 NOTE — ASSESSMENT & PLAN NOTE
Gout shows Good control.  Uric Acid: 2.8, done on 1/2/2025.  Last Cr was 1.01 done on 1/2/2025.  Last eGFR was 56 on 1/2/2025.  Gout Meds: allopurinol Tabs - 300 MG

## 2025-01-06 ENCOUNTER — OFFICE VISIT (OUTPATIENT)
Dept: FAMILY MEDICINE CLINIC | Facility: CLINIC | Age: 85
End: 2025-01-06
Payer: MEDICARE

## 2025-01-06 VITALS
BODY MASS INDEX: 29.01 KG/M2 | HEART RATE: 86 BPM | SYSTOLIC BLOOD PRESSURE: 118 MMHG | HEIGHT: 61.5 IN | WEIGHT: 155.63 LBS | RESPIRATION RATE: 16 BRPM | DIASTOLIC BLOOD PRESSURE: 70 MMHG

## 2025-01-06 DIAGNOSIS — E11.22 TYPE 2 DIABETES MELLITUS WITH STAGE 3A CHRONIC KIDNEY DISEASE, WITHOUT LONG-TERM CURRENT USE OF INSULIN (HCC): ICD-10-CM

## 2025-01-06 DIAGNOSIS — K21.9 GASTROESOPHAGEAL REFLUX DISEASE WITHOUT ESOPHAGITIS: ICD-10-CM

## 2025-01-06 DIAGNOSIS — F33.1 MODERATE EPISODE OF RECURRENT MAJOR DEPRESSIVE DISORDER (HCC): ICD-10-CM

## 2025-01-06 DIAGNOSIS — I12.9 HYPERTENSIVE KIDNEY DISEASE: Chronic | ICD-10-CM

## 2025-01-06 DIAGNOSIS — E78.2 MIXED HYPERLIPIDEMIA: ICD-10-CM

## 2025-01-06 DIAGNOSIS — R79.89 ELEVATED MORNING SERUM CORTISOL LEVEL: ICD-10-CM

## 2025-01-06 DIAGNOSIS — E03.9 ACQUIRED HYPOTHYROIDISM: Chronic | ICD-10-CM

## 2025-01-06 DIAGNOSIS — M20.42 HAMMER TOES OF BOTH FEET: Chronic | ICD-10-CM

## 2025-01-06 DIAGNOSIS — Z85.3 PERSONAL HISTORY OF BREAST CANCER: ICD-10-CM

## 2025-01-06 DIAGNOSIS — K76.0 FATTY INFILTRATION OF LIVER: ICD-10-CM

## 2025-01-06 DIAGNOSIS — N32.81 OVERACTIVE BLADDER: ICD-10-CM

## 2025-01-06 DIAGNOSIS — Z00.00 ENCOUNTER FOR ANNUAL HEALTH EXAMINATION: ICD-10-CM

## 2025-01-06 DIAGNOSIS — M20.41 HAMMER TOES OF BOTH FEET: Chronic | ICD-10-CM

## 2025-01-06 DIAGNOSIS — M1A.0720 CHRONIC IDIOPATHIC GOUT INVOLVING TOE OF LEFT FOOT WITHOUT TOPHUS: Chronic | ICD-10-CM

## 2025-01-06 DIAGNOSIS — Z00.00 ANNUAL PHYSICAL EXAM: Primary | ICD-10-CM

## 2025-01-06 DIAGNOSIS — D69.6 THROMBOCYTOPENIA (HCC): ICD-10-CM

## 2025-01-06 DIAGNOSIS — N18.31 TYPE 2 DIABETES MELLITUS WITH STAGE 3A CHRONIC KIDNEY DISEASE, WITHOUT LONG-TERM CURRENT USE OF INSULIN (HCC): ICD-10-CM

## 2025-01-06 NOTE — PATIENT INSTRUCTIONS
Ino Cuevas's SCREENING SCHEDULE   Tests on this list are recommended by your physician but may not be covered, or covered at this frequency, by your insurer.   Please check with your insurance carrier before scheduling to verify coverage.   PREVENTATIVE SERVICES FREQUENCY &  COVERAGE DETAILS LAST COMPLETION DATE   Diabetes Screening    Fasting Blood Sugar /  Glucose    One screening every 12 months if never tested or if previously tested but not diagnosed with pre-diabetes   One screening every 6 months if diagnosed with pre-diabetes Lab Results   Component Value Date    GLU 84 01/02/2025        Cardiovascular Disease Screening    Lipid Panel  Cholesterol  Lipoprotein (HDL)  Triglycerides Covered every 5 years for all Medicare beneficiaries without apparent signs or symptoms of cardiovascular disease Lab Results   Component Value Date    CHOLEST 145 01/02/2025    HDL 69 (H) 01/02/2025    LDL 63 01/02/2025    TRIG 65 01/02/2025         Electrocardiogram (EKG)   Covered if needed at Welcome to Medicare, and non-screening if indicated for medical reasons 03/18/2015      Ultrasound Screening for Abdominal Aortic Aneurysm (AAA) Covered once in a lifetime for one of the following risk factors   • Men who are 65-75 years old and have ever smoked   • Anyone with a family history -     Colorectal Cancer Screening  Covered for ages 50-85; only need ONE of the following:    Colonoscopy   Covered every 10 years    Covered every 2 years if patient is at high risk or previous colonoscopy was abnormal 07/25/2018    No recommendations at this time    Flexible Sigmoidoscopy   Covered every 4 years -    Fecal Occult Blood Test Covered annually -   Bone Density Screening    Bone density screening    Covered every 2 years after age 65 if diagnosed with risk of osteoporosis or estrogen deficiency.    Covered yearly for long-term glucocorticoid medication use (Steroids) Last Dexa Scan:    XR DEXA BONE DENSITOMETRY (CPT=77080)  04/17/2024      No recommendations at this time   Pap and Pelvic    Pap   Covered every 2 years for women at normal risk; Annually if at high risk -  No recommendations at this time    Chlamydia Annually if high risk -  No recommendations at this time   Screening Mammogram    Mammogram     Recommend annually for all female patients aged 40 and older    One baseline mammogram covered for patients aged 35-39 -    No recommendations at this time    Immunizations    Influenza Covered once per flu season  Please get every year -  Influenza Vaccine(1) due on 10/01/2024    Pneumococcal Each vaccine ([id number] & [id number]) covered once after 65 Prevnar 13: 03/09/2016    [id number]: 11/02/2009     No recommendations at this time    Hepatitis B One screening covered for patients with certain risk factors   -  No recommendations at this time    Tetanus Toxoid Not covered by Medicare Part B unless medically necessary (cut with metal); may be covered with your pharmacy prescription benefits 01/01/2010    Tetanus, Diptheria and Pertusis TD and TDaP Not covered by Medicare Part B -  No recommendations at this time    Zoster Not covered by Medicare Part B; may be covered with your pharmacy  prescription benefits -  Zoster Vaccines(1 of 2) Never done     Diabetes      Hemoglobin A1C Annually; if last result is elevated, may be asked to retest more frequently.    Medicare covers every 3 months Lab Results   Component Value Date     01/02/2025    A1C 5.7 (H) 01/02/2025       No recommendations at this time    Creat/alb ratio Annually Lab Results   Component Value Date    MICROALBCREA 6.2 05/09/2024    MALBCRECALC 30.2 (H) 05/22/2014       LDL Annually Lab Results   Component Value Date    LDL 63 01/02/2025       Dilated Eye Exam Annually [unfilled]     Annual Monitoring of Persistent Medications (ACE/ARB, digoxin diuretics, anticonvulsants)    Potassium Annually Lab Results   Component Value Date    K 3.8 01/02/2025          Creatinine   Annually Lab Results   Component Value Date    CREATSERUM 1.01 01/02/2025         BUN Annually Lab Results   Component Value Date    BUN 16 01/02/2025       Drug Serum Conc Annually No results found for: \"DIGOXIN\", \"DIG\", \"VALP\"

## 2025-01-06 NOTE — PROGRESS NOTES
Subjective:   Ino Cuevas is a 84 year old female who presents for a MA AHA (Medicare Advantage Annual Health Assessment) and Subsequent Annual Wellness visit (Pt already had Initial Annual Wellness) and scheduled follow up of multiple significant but stable problems.   Doing OK<  just passed away, she is looking at moving because of bad knees and stairs. Lexapro helping with sleep and staebility  Eye doctor next week.     History/Other:   Fall Risk Assessment:   She has been screened for Falls and is low risk.      Cognitive Assessment:   She had a completely normal cognitive assessment - see flowsheet entries     Functional Ability/Status:   Ino Cuevas has a completely normal functional assessment. See flowsheet for details.      Depression Screening (PHQ):  PHQ-2 SCORE: 0  , done 1/6/2025   Trouble falling or staying asleep, or sleeping too much: 1     Feeling tired or having little energy: 1    If you checked off any problems, how difficult have these problems made it for you to do your work, take care of things at home, or get along with other people?: Not difficult at all         Advanced Directives:   She does NOT have a Living Will. [ ]  She has a Power of  for Health Care on file in [employer].  Discussed Advance Care Planning with patient (and family/surrogate if present). Standard forms made available to patient in After Visit Summary.      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    Current Medications:  Outpatient Medications Marked as Taking for the 1/6/25 encounter (Office Visit) with Oren Hernandez MD   Medication Sig    allopurinol 300 MG Oral Tab Take 1 tablet (300 mg total) by mouth daily.    Mirabegron ER (MYRBETRIQ) 50 MG Oral Tablet 24 Hr Take 1 tablet (50 mg total) by mouth daily.    LOSARTAN 100 MG Oral Tab TAKE ONE TABLET BY MOUTH EVERY DAY.    OMEPRAZOLE 40 MG Oral Capsule Delayed Release TAKE ONE CAPSULE BY MOUTH EVERY DAY BEFORE breakfast.    simvastatin 10 MG Oral Tab TAKE ONE TABLET BY MOUTH NIGHTLY    levothyroxine 50 MCG Oral Tab Take 1 tablet (50 mcg total) by mouth before breakfast.    traZODone 50 MG Oral Tab Take 1 tablet (50 mg total) by mouth nightly.    hydroCHLOROthiazide 12.5 MG Oral Tab Take 1 tablet (12.5 mg total) by mouth daily.    escitalopram 20 MG Oral Tab Take 1 tablet (20 mg total) by mouth daily.    triamcinolone 0.1 % External Ointment Apply to breast rash twice a day    Alclometasone Dipropionate 0.05 % External Ointment Apply to nose twice daily    Nystatin (NYSTOP) 651796 UNIT/GM External Powder Apply 1 Application topically 2 (two) times daily as needed (rash in skin folds).    Cholecalciferol (VITAMIN D-3 OR) Take 1 tablet by mouth daily.    magnesium 250 MG Oral Tab Take 2 tablets (500 mg total) by mouth daily.    Psyllium 0.52 g Oral Cap Take by mouth 2 (two) times daily.    Ascorbic Acid (VITAMIN C) 500 MG Oral Cap Take 1,000 mg by mouth.    Aspirin (ASPIR-81 OR) Take  by mouth daily.       Medical History:  She  has a past medical history of Breast cancer (HCC) (09-07-12), Combined forms of age-related cataract, bilateral (4/11/2017), Plantar fasciitis (8/2/2017), S/P lumpectomy, right breast (09/12), and Traumatic rupture of left posterior tibial tendon, initial encounter (12/7/2017).  Surgical History:  She  has a past surgical  history that includes esophagoscopy,biopsy (3/1/2011); lumpectomy right (9/12); hysterectomy (2/12/13); colonoscopy (1/1/2011); and radiation right (Right, 09/2012).   Family History:  Her family history includes Breast Cancer (age of onset: 69) in her self; Heart Disease in her father; Stroke in her mother.  Social History:  She  reports that she has never smoked. She has never used smokeless tobacco. She reports that she does not drink alcohol and does not use drugs.    Tobacco:  She has never smoked tobacco.    CAGE Alcohol Screen:   Cage screening not needed because reported NO to Alcohol use on social history.      Patient Care Team:  Oren Hernandez MD as PCP - General (Family Practice)  Christine Quispe (SURGERY, GENERAL)  Kit Crabtree MD (OPHTHALMOLOGY)  Adriana Espinal CMA as [facility] Care Manager  Jhoan Edmonds MD (Surgery, Orthopedic Adult Reconstructive)    Review of Systems   Constitutional: Negative.  Negative for activity change, appetite change, chills and fever.   HENT: Negative.     Eyes: Negative.    Respiratory: Negative.  Negative for shortness of breath.    Cardiovascular: Negative.  Negative for chest pain and palpitations.   Gastrointestinal: Negative.  Negative for abdominal pain.   Genitourinary: Negative.  Negative for dysuria.   Musculoskeletal:  Negative for arthralgias.   Skin: Negative.  Negative for rash.   Allergic/Immunologic: Negative.    Neurological: Negative.          Objective:   Physical Exam  Vitals and nursing note reviewed.   Constitutional:       General: She is not in acute distress.     Appearance: Normal appearance.   HENT:      Head: Normocephalic and atraumatic.      Right Ear: Tympanic membrane and external ear normal.      Left Ear: Tympanic membrane and external ear normal.      Nose: Nose normal.      Mouth/Throat:      Mouth: Mucous membranes are moist.   Eyes:      Extraocular Movements: Extraocular movements intact.      Pupils: Pupils are equal, round,  and reactive to light.   Cardiovascular:      Rate and Rhythm: Normal rate and regular rhythm.      Pulses: Normal pulses.           Carotid pulses are 2+ on the right side and 2+ on the left side.       Radial pulses are 2+ on the right side and 2+ on the left side.        Dorsalis pedis pulses are 2+ on the right side and 2+ on the left side.        Posterior tibial pulses are 2+ on the right side and 2+ on the left side.      Heart sounds: Normal heart sounds, S1 normal and S2 normal. No murmur heard.  Pulmonary:      Effort: Pulmonary effort is normal.      Breath sounds: Normal breath sounds.   Abdominal:      General: Abdomen is flat. Bowel sounds are normal. There is no distension.      Palpations: Abdomen is soft.   Musculoskeletal:         General: Normal range of motion.      Cervical back: Normal range of motion and neck supple.      Right lower leg: No edema.      Left lower leg: No edema.   Skin:     General: Skin is warm and dry.      Capillary Refill: Capillary refill takes less than 2 seconds.   Neurological:      General: No focal deficit present.      Mental Status: She is alert and oriented to person, place, and time.   Psychiatric:         Mood and Affect: Mood normal.         Behavior: Behavior normal.         Thought Content: Thought content normal.       Bilateral barefoot skin diabetic exam is normal, visualized feet and the appearance is normal.  Bilateral monofilament/sensation of both feet is normal.  Pulsation pedal pulse exam of both lower legs/feet is normal as well.         /70   Pulse 86   Resp 16   Ht 5' 1.5\" (1.562 m)   Wt 155 lb 9.6 oz (70.6 kg)   BMI 28.92 kg/m²  Estimated body mass index is 28.92 kg/m² as calculated from the following:    Height as of this encounter: 5' 1.5\" (1.562 m).    Weight as of this encounter: 155 lb 9.6 oz (70.6 kg).    Medicare Hearing Assessment:   Hearing Screening    Screening Method: Whisper Test  Whisper Test Result: Pass         Visual  Acuity:   Right Eye Visual Acuity: Corrected Right Eye Chart Acuity: 20/30   Left Eye Visual Acuity: Corrected Left Eye Chart Acuity: 20/30   Both Eyes Visual Acuity: Corrected Both Eyes Chart Acuity: 20/25   Able To Tolerate Visual Acuity: Yes        Assessment & Plan:   Ino Cuevas is a 84 year old female who presents for a Medicare Assessment.     1. Annual physical exam (Primary)  2. Thrombocytopenia (HCC)  Overview:   since 2016, unclear source  Assessment & Plan:  Last Platelet value was 160 done 1/2/2025. Lowest level in the last 10 years was 125. Improving platelets. Continue to monitor and continue present management   Orders:  -     Detailed, Mod Complex (91249)  3. Type 2 diabetes mellitus with stage 3a chronic kidney disease, without long-term current use of insulin ([facility])  Overview:  No Meds, Dx 12/2011 with A1c 6.6%  Assessment & Plan:  Diabetes: A1c is 5.7 done 1/2/2025 which shows Excellent control. Continue current meds and lifestyle modification.   DM Meds:       Diabetic Complications: CKD 3a (GFR 56).   Cataract history    Diabetes is : stable Continue current treatment regimen. Reassess Diabetes in : in 6 months   Orders:  -     Detailed, Mod Complex (32706)  -     Microalb/Creat Ratio, Random Urine; Future; Expected date: 01/06/2025  4. Moderate episode of recurrent major depressive disorder (HCC)  Overview:  Trazodone 50, esictalopram 20  Assessment & Plan:  Mopderate depression well controlled on medicaitons. Clinical Course: stable   Good control    Antidepressant Meds: escitalopram Tabs - 20 MG; traZODone Tabs - 50 MG   Orders:  -     Detailed, Mod Complex (59679)  5. Mixed hyperlipidemia  Overview:  Simvastatin 10  Assessment & Plan:  Cholesterol shows Good control. Long term heart-healthy diet and lifestyle discussed and encouraged to reduce risk of cardiovascular disease.  1/2/2025: Cholesterol, Total 145; HDL Cholesterol 69 (H); Triglycerides 65; LDL Cholesterol  63  Cholesterol Meds: simvastatin Tabs - 10 MG  stable  Continue with current treatment plan   Orders:  -     Detailed, Mod Complex (97494)  6. Acquired hypothyroidism  Overview:  Levothyroxine 75  Assessment & Plan:  Thyroid shows Good control.   5/9/2024: TSH 2.480  Thryoid Meds: levothyroxine Tabs - 50 MCG well controlled current treatment plan effective, no change in therapy   Orders:  -     Detailed, Mod Complex (97529)  7. Elevated morning serum cortisol level  Overview:  A.m. cortisol was 33 with normal being under 20.  She has a low potassium and no cortisol use.  Arranging for a nighttime salivary cortisol level and if positive it would be considered Cushing's and will need endocrinology referral.  Assessment & Plan:  Stable, continue present management and continue to monitor for progression   8. Gastroesophageal reflux disease without esophagitis  Overview:  Pantoprazole 40 prn, failed H2 blocker but added to PPI 5/16/2019   Assessment & Plan:  Stable, continue present management and continue to monitor for progression on pantoprazole.   9. Fatty infiltration of liver  Overview:  3/21/15 US ABD and repeat 1/2020  Assessment & Plan:  1/2/2025: ALT 26; AST 32 stable, continue present management and continue to monitor for progression   10. Hammer toes of both feet  Overview:  moderate bilateral hammertoe, not ready for surgery    Assessment & Plan:  Stable, continue present management and continue to monitor for progression   11. Chronic idiopathic gout involving toe of left foot without tophus  Overview:  Allopurinol 300  Assessment & Plan:  Gout shows Good control.  Uric Acid: 2.8, done on 1/2/2025.  Last Cr was 1.01 done on 1/2/2025.  Last eGFR was 56 on 1/2/2025.  Gout Meds: allopurinol Tabs - 300 MG   Orders:  -     Detailed, Mod Complex (17010)  12. Hypertensive kidney disease  Overview:  HTN with CKD 3. Losartan 100, hctz 12.5  Assessment & Plan:  BP shows poor control with last BP of 141/81.  Lifestyle changes, diet, exercise and weight loss were counseled. Medication changes as listed.   1/2/2025: Potassium 3.8; Creatinine 1.01; eGFR-Cr 56 (L)  BP Meds: hydroCHLOROthiazide Tabs - 12.5 MG; losartan Tabs - 100 MG   ACE/ARB Adherence Good at 95%  CKD 3a with DM  13. Overactive bladder  Overview:  Myrtbetric 50 daily, urology is Dr Marrero at   Assessment & Plan:  Stable, continue present management and continue to monitor for progression   14. 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 S/p Arimidex x5 years until 2018  Dx Sept 7, 2012  0.7 cm, T1b, N0 WLE @ [place], metaplastic, 10% ER+, Sept 2012 Breast RT, Nov 2012 Arimidex 1 mg PO daily  Assessment & Plan:  Stable, continue present management and continue to monitor for progression   15. Encounter for annual health examination  Other orders  -     High Dose Fluzone Trivalent, 65yrs+    The patient indicates understanding of these issues and agrees to the plan.  Reinforced healthy diet, lifestyle, and exercise.      Return in 6 months (on 7/6/2025).     Oren Hernandez MD, 1/6/2025     Supplementary Documentation:   General Health:       Health Maintenance   Topic Date Due    Zoster Vaccines (1 of 2) Never done    Diabetes Care Dilated Eye Exam  08/14/2024    COVID-19 Vaccine (3 - 2024-25 season) 09/01/2024    Influenza Vaccine (1) 10/01/2024    Annual Well Visit  01/01/2025    Annual Depression Screening  01/01/2025    Fall Risk Screening (Annual)  01/01/2025    Diabetes Care: Foot Exam (Annual)  01/01/2025    Diabetes Care: Microalb/Creat Ratio (Annual)  01/01/2025    Diabetes Care A1C  07/02/2025    Diabetes Care: GFR  01/02/2026    DEXA Scan  04/17/2026    Pneumococcal Vaccine: 65+ Years  Completed

## 2025-01-22 ENCOUNTER — PATIENT OUTREACH (OUTPATIENT)
Dept: CASE MANAGEMENT | Age: 85
End: 2025-01-22

## 2025-01-22 NOTE — PROGRESS NOTES
Called patient for Chronic Care Management      Left message to call back   Call #1    [facility]  Care Management Department  [phone] work    Total time -  3 min  Total Monthly time -  3 min

## 2025-01-29 NOTE — PROGRESS NOTES
CCM Spoke with the patient today  She does not need CCM services at this time, will call us when needed

## 2025-02-20 RX ORDER — TRAZODONE HYDROCHLORIDE 50 MG/1
50 TABLET ORAL NIGHTLY
Qty: 90 TABLET | Refills: 1 | Status: SHIPPED | OUTPATIENT
Start: 2025-02-20

## 2025-02-20 NOTE — TELEPHONE ENCOUNTER
Requested Prescriptions     Pending Prescriptions Disp Refills    TRAZODONE 50 MG Oral Tab [Pharmacy Med Name: trazodone 50 mg tablet] 90 tablet 1     Sig: TAKE ONE TABLET BY MOUTH NIGHTLY        Last refill: 08/23/2024    Last Appointment: 01/06/2025    Next Appointment: 8/6/2025 Oren Hernandez MD

## 2025-05-18 DIAGNOSIS — I10 ESSENTIAL HYPERTENSION: Chronic | ICD-10-CM

## 2025-05-18 DIAGNOSIS — F32.5 MAJOR DEPRESSION IN COMPLETE REMISSION: Chronic | ICD-10-CM

## 2025-05-20 DIAGNOSIS — F32.5 MAJOR DEPRESSION IN COMPLETE REMISSION: Chronic | ICD-10-CM

## 2025-05-20 DIAGNOSIS — I10 ESSENTIAL HYPERTENSION: Chronic | ICD-10-CM

## 2025-05-20 RX ORDER — HYDROCHLOROTHIAZIDE 12.5 MG/1
12.5 TABLET ORAL DAILY
Qty: 90 TABLET | Refills: 3 | OUTPATIENT
Start: 2025-05-20

## 2025-05-20 RX ORDER — ESCITALOPRAM OXALATE 20 MG/1
20 TABLET ORAL DAILY
Qty: 90 TABLET | Refills: 3 | OUTPATIENT
Start: 2025-05-20

## 2025-05-20 RX ORDER — ESCITALOPRAM OXALATE 20 MG/1
20 TABLET ORAL DAILY
Qty: 90 TABLET | Refills: 3 | Status: SHIPPED | OUTPATIENT
Start: 2025-05-20

## 2025-05-20 RX ORDER — HYDROCHLOROTHIAZIDE 12.5 MG/1
12.5 TABLET ORAL DAILY
Qty: 90 TABLET | Refills: 3 | Status: SHIPPED | OUTPATIENT
Start: 2025-05-20

## 2025-05-20 NOTE — TELEPHONE ENCOUNTER
Requested Prescriptions     Pending Prescriptions Disp Refills    hydroCHLOROthiazide 12.5 MG Oral Tab 90 tablet 3     Sig: Take 1 tablet (12.5 mg total) by mouth daily.    escitalopram 20 MG Oral Tab 90 tablet 3     Sig: Take 1 tablet (20 mg total) by mouth daily.      Refilled per protocol/OV notes

## 2025-05-21 NOTE — TELEPHONE ENCOUNTER
Refilled earlier today    Requested Prescriptions     Refused Prescriptions Disp Refills    HYDROCHLOROTHIAZIDE 12.5 MG Oral Tab [Pharmacy Med Name: hydrochlorothiazide 12.5 mg tablet] 90 tablet 3     Sig: TAKE ONE TABLET BY MOUTH DAILY     Refused By: JENNA VEGA     Reason for Refusal: Duplicate refill request    ESCITALOPRAM 20 MG Oral Tab [Pharmacy Med Name: escitalopram 20 mg tablet] 90 tablet 3     Sig: TAKE ONE TABLET BY MOUTH DAILY     Refused By: JENNA VEGA     Reason for Refusal: Duplicate refill request

## 2025-07-16 ENCOUNTER — OFFICE VISIT (OUTPATIENT)
Dept: ORTHOPEDICS CLINIC | Facility: CLINIC | Age: 85
End: 2025-07-16
Payer: MEDICARE

## 2025-07-16 DIAGNOSIS — M17.0 PRIMARY OSTEOARTHRITIS OF BOTH KNEES: Primary | ICD-10-CM

## 2025-07-16 PROCEDURE — 1159F MED LIST DOCD IN RCRD: CPT | Performed by: ORTHOPAEDIC SURGERY

## 2025-07-16 PROCEDURE — 1160F RVW MEDS BY RX/DR IN RCRD: CPT | Performed by: ORTHOPAEDIC SURGERY

## 2025-07-16 PROCEDURE — 20610 DRAIN/INJ JOINT/BURSA W/O US: CPT | Performed by: ORTHOPAEDIC SURGERY

## 2025-07-16 RX ORDER — TRIAMCINOLONE ACETONIDE 40 MG/ML
80 INJECTION, SUSPENSION INTRA-ARTICULAR; INTRAMUSCULAR ONCE
Status: COMPLETED | OUTPATIENT
Start: 2025-07-16 | End: 2025-07-16

## 2025-07-16 RX ADMIN — TRIAMCINOLONE ACETONIDE 80 MG: 40 INJECTION, SUSPENSION INTRA-ARTICULAR; INTRAMUSCULAR at 11:27:00

## 2025-07-16 NOTE — PROCEDURES
Risks and benefits of knee injection discussed with the patient, with risks including but not limited to pain and swelling at the injection site and/or within the knee joint, infection, elevation in blood pressure and/or glucose levels, facial flushing.  After informed consent, the patient's right and left knees were marked, locally anesthetized with skin refrigerant, prepped with topical antiseptic, and injected with a mixture of 1mL 40mg/mL Kenalog, 2mL 1% lidocaine and 2mL 0.5% marcaine through the inferolateral portal.  A band-aid was applied.  The patient tolerated the procedure well.    Jhoan Edmonds MD, FAAOS  [facility] Orthopedic Surgery  Phone [phone]  Fax [phone]

## 2025-07-18 ENCOUNTER — PATIENT MESSAGE (OUTPATIENT)
Dept: FAMILY MEDICINE CLINIC | Facility: CLINIC | Age: 85
End: 2025-07-18

## 2025-07-18 NOTE — TELEPHONE ENCOUNTER
Message sent to pt letting her know there would be no lab orders at this time and the results she left w/office will be given to Dr. Hernandez for review.  Placed in Dr. Hernandez inbox.

## 2025-08-04 ENCOUNTER — TELEPHONE (OUTPATIENT)
Dept: FAMILY MEDICINE CLINIC | Facility: CLINIC | Age: 85
End: 2025-08-04

## 2025-08-04 DIAGNOSIS — E79.0 HYPERURICEMIA: ICD-10-CM

## 2025-08-04 DIAGNOSIS — R73.9 HYPERGLYCEMIA: ICD-10-CM

## 2025-08-04 DIAGNOSIS — I10 ESSENTIAL HYPERTENSION: ICD-10-CM

## 2025-08-04 DIAGNOSIS — E78.5 DYSLIPIDEMIA: ICD-10-CM

## 2025-08-04 DIAGNOSIS — N18.31 TYPE 2 DIABETES MELLITUS WITH STAGE 3A CHRONIC KIDNEY DISEASE, WITHOUT LONG-TERM CURRENT USE OF INSULIN (HCC): ICD-10-CM

## 2025-08-04 DIAGNOSIS — Z13.6 SCREENING FOR CARDIOVASCULAR CONDITION: ICD-10-CM

## 2025-08-04 DIAGNOSIS — N18.31 STAGE 3A CHRONIC KIDNEY DISEASE (CKD) (HCC): ICD-10-CM

## 2025-08-04 DIAGNOSIS — D53.9 MACROCYTIC ANEMIA: Primary | ICD-10-CM

## 2025-08-04 DIAGNOSIS — E11.22 TYPE 2 DIABETES MELLITUS WITH STAGE 3A CHRONIC KIDNEY DISEASE, WITHOUT LONG-TERM CURRENT USE OF INSULIN (HCC): ICD-10-CM

## 2025-08-06 ENCOUNTER — OFFICE VISIT (OUTPATIENT)
Dept: FAMILY MEDICINE CLINIC | Facility: CLINIC | Age: 85
End: 2025-08-06

## 2025-08-06 VITALS
HEART RATE: 82 BPM | SYSTOLIC BLOOD PRESSURE: 132 MMHG | WEIGHT: 154.38 LBS | OXYGEN SATURATION: 96 % | HEIGHT: 61.5 IN | BODY MASS INDEX: 28.78 KG/M2 | RESPIRATION RATE: 16 BRPM | DIASTOLIC BLOOD PRESSURE: 68 MMHG

## 2025-08-06 DIAGNOSIS — E78.2 MIXED HYPERLIPIDEMIA: ICD-10-CM

## 2025-08-06 DIAGNOSIS — E03.9 ACQUIRED HYPOTHYROIDISM: Chronic | ICD-10-CM

## 2025-08-06 DIAGNOSIS — N32.81 OVERACTIVE BLADDER: ICD-10-CM

## 2025-08-06 DIAGNOSIS — I12.9 HYPERTENSIVE KIDNEY DISEASE: Chronic | ICD-10-CM

## 2025-08-06 DIAGNOSIS — E11.22 TYPE 2 DIABETES MELLITUS WITH STAGE 3A CHRONIC KIDNEY DISEASE, WITHOUT LONG-TERM CURRENT USE OF INSULIN (HCC): Primary | ICD-10-CM

## 2025-08-06 DIAGNOSIS — F33.1 MODERATE EPISODE OF RECURRENT MAJOR DEPRESSIVE DISORDER (HCC): ICD-10-CM

## 2025-08-06 DIAGNOSIS — N18.31 TYPE 2 DIABETES MELLITUS WITH STAGE 3A CHRONIC KIDNEY DISEASE, WITHOUT LONG-TERM CURRENT USE OF INSULIN (HCC): Primary | ICD-10-CM

## 2025-08-06 PROCEDURE — 1159F MED LIST DOCD IN RCRD: CPT | Performed by: FAMILY MEDICINE

## 2025-08-06 PROCEDURE — 99214 OFFICE O/P EST MOD 30 MIN: CPT | Performed by: FAMILY MEDICINE

## 2025-08-06 PROCEDURE — 1160F RVW MEDS BY RX/DR IN RCRD: CPT | Performed by: FAMILY MEDICINE

## 2025-08-06 PROCEDURE — G2211 COMPLEX E/M VISIT ADD ON: HCPCS | Performed by: FAMILY MEDICINE

## 2025-08-06 RX ORDER — MIRABEGRON 50 MG/1
50 TABLET, FILM COATED, EXTENDED RELEASE ORAL DAILY
Qty: 90 TABLET | Refills: 3 | Status: SHIPPED | OUTPATIENT
Start: 2025-08-06

## 2025-08-15 ENCOUNTER — LAB ENCOUNTER (OUTPATIENT)
Dept: LAB | Age: 85
End: 2025-08-15
Attending: FAMILY MEDICINE

## 2025-08-15 DIAGNOSIS — E11.22 TYPE 2 DIABETES MELLITUS WITH STAGE 3A CHRONIC KIDNEY DISEASE, WITHOUT LONG-TERM CURRENT USE OF INSULIN (HCC): ICD-10-CM

## 2025-08-15 DIAGNOSIS — N18.31 TYPE 2 DIABETES MELLITUS WITH STAGE 3A CHRONIC KIDNEY DISEASE, WITHOUT LONG-TERM CURRENT USE OF INSULIN (HCC): ICD-10-CM

## 2025-08-15 DIAGNOSIS — E78.5 DYSLIPIDEMIA: ICD-10-CM

## 2025-08-15 DIAGNOSIS — E79.0 HYPERURICEMIA: ICD-10-CM

## 2025-08-15 DIAGNOSIS — N18.31 STAGE 3A CHRONIC KIDNEY DISEASE (CKD) (HCC): ICD-10-CM

## 2025-08-15 DIAGNOSIS — R73.9 HYPERGLYCEMIA: ICD-10-CM

## 2025-08-15 DIAGNOSIS — D53.9 MACROCYTIC ANEMIA: ICD-10-CM

## 2025-08-15 DIAGNOSIS — Z13.6 SCREENING FOR CARDIOVASCULAR CONDITION: ICD-10-CM

## 2025-08-15 DIAGNOSIS — I10 ESSENTIAL HYPERTENSION: ICD-10-CM

## 2025-08-15 LAB
ALBUMIN SERPL-MCNC: 4.4 G/DL (ref 3.2–4.8)
ALBUMIN/GLOB SERPL: 2.3 (ref 1–2)
ALP LIVER SERPL-CCNC: 71 U/L (ref 55–142)
ALT SERPL-CCNC: 17 U/L (ref 10–49)
ANION GAP SERPL CALC-SCNC: 10 MMOL/L (ref 0–18)
AST SERPL-CCNC: 27 U/L (ref ?–34)
BASOPHILS # BLD AUTO: 0.02 X10(3) UL (ref 0–0.2)
BASOPHILS NFR BLD AUTO: 0.4 %
BILIRUB SERPL-MCNC: 0.6 MG/DL (ref 0.2–1.1)
BUN BLD-MCNC: 18 MG/DL (ref 9–23)
CALCIUM BLD-MCNC: 9.3 MG/DL (ref 8.7–10.6)
CHLORIDE SERPL-SCNC: 105 MMOL/L (ref 98–112)
CHOLEST SERPL-MCNC: 142 MG/DL (ref ?–200)
CO2 SERPL-SCNC: 28 MMOL/L (ref 21–32)
CREAT BLD-MCNC: 1.08 MG/DL (ref 0.55–1.02)
CREAT UR-SCNC: 139.3 MG/DL
EGFRCR SERPLBLD CKD-EPI 2021: 51 ML/MIN/1.73M2 (ref 60–?)
EOSINOPHIL # BLD AUTO: 0.11 X10(3) UL (ref 0–0.7)
EOSINOPHIL NFR BLD AUTO: 2.1 %
ERYTHROCYTE [DISTWIDTH] IN BLOOD BY AUTOMATED COUNT: 14.6 %
EST. AVERAGE GLUCOSE BLD GHB EST-MCNC: 117 MG/DL (ref 68–126)
FASTING PATIENT LIPID ANSWER: YES
FASTING STATUS PATIENT QL REPORTED: YES
FOLATE SERPL-MCNC: 16 NG/ML (ref 5.4–?)
GLOBULIN PLAS-MCNC: 1.9 G/DL (ref 2–3.5)
GLUCOSE BLD-MCNC: 85 MG/DL (ref 70–99)
HBA1C MFR BLD: 5.7 % (ref ?–5.7)
HCT VFR BLD AUTO: 36.3 % (ref 35–48)
HDLC SERPL-MCNC: 71 MG/DL (ref 40–59)
HGB BLD-MCNC: 11.7 G/DL (ref 12–16)
IMM GRANULOCYTES # BLD AUTO: 0.02 X10(3) UL (ref 0–1)
IMM GRANULOCYTES NFR BLD: 0.4 %
LDLC SERPL CALC-MCNC: 61 MG/DL (ref ?–100)
LYMPHOCYTES # BLD AUTO: 1.14 X10(3) UL (ref 1–4)
LYMPHOCYTES NFR BLD AUTO: 21.4 %
MCH RBC QN AUTO: 32.2 PG (ref 26–34)
MCHC RBC AUTO-ENTMCNC: 32.2 G/DL (ref 31–37)
MCV RBC AUTO: 100 FL (ref 80–100)
MICROALBUMIN UR-MCNC: 0.7 MG/DL
MICROALBUMIN/CREAT 24H UR-RTO: 5 UG/MG (ref ?–30)
MONOCYTES # BLD AUTO: 0.44 X10(3) UL (ref 0.1–1)
MONOCYTES NFR BLD AUTO: 8.3 %
NEUTROPHILS # BLD AUTO: 3.59 X10 (3) UL (ref 1.5–7.7)
NEUTROPHILS # BLD AUTO: 3.59 X10(3) UL (ref 1.5–7.7)
NEUTROPHILS NFR BLD AUTO: 67.4 %
NONHDLC SERPL-MCNC: 71 MG/DL (ref ?–130)
OSMOLALITY SERPL CALC.SUM OF ELEC: 297 MOSM/KG (ref 275–295)
PLATELET # BLD AUTO: 174 10(3)UL (ref 150–450)
POTASSIUM SERPL-SCNC: 4.3 MMOL/L (ref 3.5–5.1)
PROT SERPL-MCNC: 6.3 G/DL (ref 5.7–8.2)
RBC # BLD AUTO: 3.63 X10(6)UL (ref 3.8–5.3)
SODIUM SERPL-SCNC: 143 MMOL/L (ref 136–145)
TRIGL SERPL-MCNC: 40 MG/DL (ref 30–149)
URATE SERPL-MCNC: 2.8 MG/DL (ref 3.1–7.8)
VIT B12 SERPL-MCNC: 279 PG/ML (ref 211–911)
VLDLC SERPL CALC-MCNC: 6 MG/DL (ref 0–30)
WBC # BLD AUTO: 5.3 X10(3) UL (ref 4–11)

## 2025-08-15 PROCEDURE — 82043 UR ALBUMIN QUANTITATIVE: CPT

## 2025-08-15 PROCEDURE — 82607 VITAMIN B-12: CPT

## 2025-08-15 PROCEDURE — 82746 ASSAY OF FOLIC ACID SERUM: CPT

## 2025-08-15 PROCEDURE — 36415 COLL VENOUS BLD VENIPUNCTURE: CPT

## 2025-08-15 PROCEDURE — 83036 HEMOGLOBIN GLYCOSYLATED A1C: CPT

## 2025-08-15 PROCEDURE — 80053 COMPREHEN METABOLIC PANEL: CPT

## 2025-08-15 PROCEDURE — 84550 ASSAY OF BLOOD/URIC ACID: CPT

## 2025-08-15 PROCEDURE — 85025 COMPLETE CBC W/AUTO DIFF WBC: CPT

## 2025-08-15 PROCEDURE — 82570 ASSAY OF URINE CREATININE: CPT

## 2025-08-15 PROCEDURE — 80061 LIPID PANEL: CPT

## 2025-08-19 RX ORDER — TRAZODONE HYDROCHLORIDE 50 MG/1
50 TABLET ORAL NIGHTLY
Qty: 90 TABLET | Refills: 3 | Status: SHIPPED | OUTPATIENT
Start: 2025-08-19

## (undated) NOTE — MR AVS SNAPSHOT
[facility]  [place],  [address] [place] [address] [id number]               Thank you for choosing us for your health care visit with Jose Wood MD.  We are glad to serve you and happy to provide you with this summa • Previous elevated impaired FBS or GTT   … or any two of the following:   • Overweight (BMI ³25 but <30)   • Family history of diabetes   • Age 72 years or older   • History of gestational diabetes or birth of baby weighing more than 9 pounds     Covered Covered annually for Diabetics, people with Glaucoma family history,   Americans over age 48   Americans over age 72 Data entered on: 10/20/2016   Last Dilated Eye Exam 10/19/2016    OK to schedule if you are in this risk group, make sure yo Persons who live in the same house as a HepB virus carrier   Homosexual men   Illicit injectable drug abusers     Tetanus Toxoid- Only covered with a cut with metal- TD and TDaP Not covered by Medicare Part B) No orders found for this or any previous visit Commonly known as:  LEXAPRO           Levothyroxine Sodium 75 MCG Tabs   Take 1 tablet (75 mcg total) by mouth before breakfast.   What changed:  See the new instructions.    Commonly known as:  SYNTHROID, LEVOTHROID           Losartan Potassium-HCTZ 100-12

## (undated) NOTE — MR AVS SNAPSHOT
[facility] [address] [facility]  [address] [facility]  [place] [address]  [phone]               Thank you for choosing us for your health care visit with LOIDA Delarosa. We are glad to serve you and happy to provide you with this summary of your visit. [address] [facility]   [phone]              Reason for Today's Visit     URI           Medical Issues Discussed Today     Acute nasopharyngitis    -  Primary      Instructions and Information about [address] such as juices and sodas. These can make diarrhea worse. Older children and adults can drink sports drinks. · As your appetite returns, you can resume your normal diet.  Ask your doctor whether there are any foods you should avoid.     When to seek medical Take 1 tablet (20 mg total) by mouth daily. Commonly known as:  LEXAPRO           Levothyroxine Sodium 75 MCG Tabs   TAKE 1 TABLET BY MOUTH ONCE DAILY.    Commonly known as:  SYNTHROID, LEVOTHROID           Losartan Potassium-HCTZ 100-12.5 MG Tabs   Take Eat plenty of low-fat dairy products High fat meats and dairy   Choose whole grain products Foods high in sodium   Water is best for hydration Fast food.    Eat at home when possible     Tips for increasing your physical activity – Adults who are physically

## (undated) NOTE — LETTER
[address] [facility], [address] [facility], [address] [place] [address] [address] ,[address], [place] [address] [address] [facility] [address]        Aviva Mathur MD  • Toño Hi MD • Margy Hunt MD • DO María Elena Metzger PA-C • LOIDA Corbin Do

## (undated) NOTE — LETTER
July 3, 2024    Ino Cuevsa  [address]  [facility] [address]      Dear Ino:  It was a pleasure speaking with you over the phone recently. To follow up, I wanted to send you my contact information to utilize when you have a question and or need some assistance. We are excited that you have decided to give our Chronic Care Management program a try. I am available to provide you with the support and education needed to keep you healthy.     Together We Will Work On:    Coordination of Care: Helping to reduce duplicate orders/tests to save you time and money  Medications: Review, Educate, & Discuss any concerns or issues you may have  Personalized education and support regarding your health.    These services, among others will help you take control of your health and provide you with optimal quality care. I have attached a more in depth review of the program to help outline the information we had talked about over the phone. If you have any questions or concerns please feel free to contact myself your Health Care Manager and/or your insurance company for more details.     I look forward to working with you,    Adriana Espinal  Health   Care Management Department    [email or web address]   [phone] work  [address] ,  [place] [address]